# Patient Record
Sex: MALE | Race: WHITE | NOT HISPANIC OR LATINO | Employment: FULL TIME | URBAN - METROPOLITAN AREA
[De-identification: names, ages, dates, MRNs, and addresses within clinical notes are randomized per-mention and may not be internally consistent; named-entity substitution may affect disease eponyms.]

---

## 2019-12-05 ENCOUNTER — LAB VISIT (OUTPATIENT)
Dept: LAB | Facility: HOSPITAL | Age: 29
End: 2019-12-05
Attending: INTERNAL MEDICINE
Payer: COMMERCIAL

## 2019-12-05 ENCOUNTER — IMMUNIZATION (OUTPATIENT)
Dept: PHARMACY | Facility: CLINIC | Age: 29
End: 2019-12-05
Payer: COMMERCIAL

## 2019-12-05 ENCOUNTER — OFFICE VISIT (OUTPATIENT)
Dept: INTERNAL MEDICINE | Facility: CLINIC | Age: 29
End: 2019-12-05
Payer: COMMERCIAL

## 2019-12-05 ENCOUNTER — IMMUNIZATION (OUTPATIENT)
Dept: PHARMACY | Facility: CLINIC | Age: 29
End: 2019-12-05

## 2019-12-05 VITALS
HEIGHT: 75 IN | OXYGEN SATURATION: 97 % | SYSTOLIC BLOOD PRESSURE: 158 MMHG | DIASTOLIC BLOOD PRESSURE: 100 MMHG | HEART RATE: 83 BPM | WEIGHT: 255.06 LBS | BODY MASS INDEX: 31.71 KG/M2

## 2019-12-05 DIAGNOSIS — R03.0 ELEVATED BP WITHOUT DIAGNOSIS OF HYPERTENSION: Primary | ICD-10-CM

## 2019-12-05 DIAGNOSIS — Z01.89 ROUTINE LAB DRAW: ICD-10-CM

## 2019-12-05 DIAGNOSIS — M75.21 TENDONITIS OF UPPER BICEPS TENDON OF RIGHT SHOULDER: ICD-10-CM

## 2019-12-05 DIAGNOSIS — L03.012 PARONYCHIA OF LEFT MIDDLE FINGER: ICD-10-CM

## 2019-12-05 DIAGNOSIS — D22.9 CHANGE IN SKIN MOLE: ICD-10-CM

## 2019-12-05 LAB
ALBUMIN SERPL BCP-MCNC: 4.5 G/DL (ref 3.5–5.2)
ALP SERPL-CCNC: 73 U/L (ref 55–135)
ALT SERPL W/O P-5'-P-CCNC: 34 U/L (ref 10–44)
ANION GAP SERPL CALC-SCNC: 11 MMOL/L (ref 8–16)
AST SERPL-CCNC: 20 U/L (ref 10–40)
BASOPHILS # BLD AUTO: 0.03 K/UL (ref 0–0.2)
BASOPHILS NFR BLD: 0.4 % (ref 0–1.9)
BILIRUB SERPL-MCNC: 0.7 MG/DL (ref 0.1–1)
BUN SERPL-MCNC: 14 MG/DL (ref 6–20)
CALCIUM SERPL-MCNC: 9.6 MG/DL (ref 8.7–10.5)
CHLORIDE SERPL-SCNC: 102 MMOL/L (ref 95–110)
CHOLEST SERPL-MCNC: 237 MG/DL (ref 120–199)
CHOLEST/HDLC SERPL: 6.2 {RATIO} (ref 2–5)
CO2 SERPL-SCNC: 29 MMOL/L (ref 23–29)
CREAT SERPL-MCNC: 1.2 MG/DL (ref 0.5–1.4)
DIFFERENTIAL METHOD: ABNORMAL
EOSINOPHIL # BLD AUTO: 0.2 K/UL (ref 0–0.5)
EOSINOPHIL NFR BLD: 2.9 % (ref 0–8)
ERYTHROCYTE [DISTWIDTH] IN BLOOD BY AUTOMATED COUNT: 12.4 % (ref 11.5–14.5)
EST. GFR  (AFRICAN AMERICAN): >60 ML/MIN/1.73 M^2
EST. GFR  (NON AFRICAN AMERICAN): >60 ML/MIN/1.73 M^2
GLUCOSE SERPL-MCNC: 95 MG/DL (ref 70–110)
HCT VFR BLD AUTO: 46.9 % (ref 40–54)
HDLC SERPL-MCNC: 38 MG/DL (ref 40–75)
HDLC SERPL: 16 % (ref 20–50)
HGB BLD-MCNC: 17.1 G/DL (ref 14–18)
LDLC SERPL CALC-MCNC: 137 MG/DL (ref 63–159)
LYMPHOCYTES # BLD AUTO: 1.9 K/UL (ref 1–4.8)
LYMPHOCYTES NFR BLD: 27 % (ref 18–48)
MCH RBC QN AUTO: 29.9 PG (ref 27–31)
MCHC RBC AUTO-ENTMCNC: 36.5 G/DL (ref 32–36)
MCV RBC AUTO: 82 FL (ref 82–98)
MONOCYTES # BLD AUTO: 0.5 K/UL (ref 0.3–1)
MONOCYTES NFR BLD: 6.8 % (ref 4–15)
NEUTROPHILS # BLD AUTO: 4.4 K/UL (ref 1.8–7.7)
NEUTROPHILS NFR BLD: 62.9 % (ref 38–73)
NONHDLC SERPL-MCNC: 199 MG/DL
PLATELET # BLD AUTO: 217 K/UL (ref 150–350)
PMV BLD AUTO: 10.8 FL (ref 9.2–12.9)
POTASSIUM SERPL-SCNC: 4 MMOL/L (ref 3.5–5.1)
PROT SERPL-MCNC: 7.5 G/DL (ref 6–8.4)
RBC # BLD AUTO: 5.71 M/UL (ref 4.6–6.2)
SODIUM SERPL-SCNC: 142 MMOL/L (ref 136–145)
TRIGL SERPL-MCNC: 310 MG/DL (ref 30–150)
WBC # BLD AUTO: 7.01 K/UL (ref 3.9–12.7)

## 2019-12-05 PROCEDURE — 3008F BODY MASS INDEX DOCD: CPT | Mod: CPTII,S$GLB,, | Performed by: INTERNAL MEDICINE

## 2019-12-05 PROCEDURE — 99204 OFFICE O/P NEW MOD 45 MIN: CPT | Mod: S$GLB,,, | Performed by: INTERNAL MEDICINE

## 2019-12-05 PROCEDURE — 3008F PR BODY MASS INDEX (BMI) DOCUMENTED: ICD-10-PCS | Mod: CPTII,S$GLB,, | Performed by: INTERNAL MEDICINE

## 2019-12-05 PROCEDURE — 83036 HEMOGLOBIN GLYCOSYLATED A1C: CPT

## 2019-12-05 PROCEDURE — 80053 COMPREHEN METABOLIC PANEL: CPT

## 2019-12-05 PROCEDURE — 99999 PR PBB SHADOW E&M-NEW PATIENT-LVL IV: CPT | Mod: PBBFAC,,, | Performed by: INTERNAL MEDICINE

## 2019-12-05 PROCEDURE — 80061 LIPID PANEL: CPT

## 2019-12-05 PROCEDURE — 99999 PR PBB SHADOW E&M-NEW PATIENT-LVL IV: ICD-10-PCS | Mod: PBBFAC,,, | Performed by: INTERNAL MEDICINE

## 2019-12-05 PROCEDURE — 36415 COLL VENOUS BLD VENIPUNCTURE: CPT

## 2019-12-05 PROCEDURE — 99204 PR OFFICE/OUTPT VISIT, NEW, LEVL IV, 45-59 MIN: ICD-10-PCS | Mod: S$GLB,,, | Performed by: INTERNAL MEDICINE

## 2019-12-05 PROCEDURE — 85025 COMPLETE CBC W/AUTO DIFF WBC: CPT

## 2019-12-05 RX ORDER — IBUPROFEN 200 MG
200 TABLET ORAL EVERY 6 HOURS PRN
COMMUNITY
End: 2024-02-07

## 2019-12-05 NOTE — PROGRESS NOTES
"    CHIEF COMPLAINT     CC: elevated BP and r shoulder pain  New patient    HPI     Ellitot Chavez is a 29 y.o. male here today for elevated BP    Elevated BP.   Never had previous measurement of HTN. Reports he checks at drug store periodically but never had high BP. Reports several family members with HTN. No hx of secondary workup. However, family is heavy.  Reports that since moving to [place], his diet hasn't been as good and his salt intake has definitely increased.      R shoulder  Reports dull ache anterior shoulder-->upper arm. Patient reports pain is brought on by bow motion. Has been going on for 10 months. Reports sx are off and on. Reports no sx for past week but had episode today. Sx are more frequent when he is busy playing. Never been seen by provider for this issue. Taking ibuprofen which has been helpful.   No hx trauma, bruising,     Changing moles.  Reports has noticed some changes to several of his moles.     Personally Reviewed Patient's Medical, surgical, family and social hx. Changes updated in [facility].  Care Team updated in Epic    Review of Systems:  Review of Systems   Eyes: Negative for visual disturbance.   Musculoskeletal: Positive for arthralgias (r shoulder).   Skin:        moles   Neurological: Negative for headaches.       Health Maintenance:   Reviewed with patient  Due for the following:  TDAP- deferred 2/2 concert  Flu      PHYSICAL EXAM     BP (!) 158/100   Pulse 83   Ht 6' 3" (1.905 m)   Wt 115.7 kg (255 lb 1.2 oz)   SpO2 97%   BMI 31.88 kg/m²     Gen: Well Appearing, NAD  HEENT: PERR, EOMI  Neck: FROM, no thyromegaly, no cervical adenopathy  CVD: RRR, no M/R/G  Pulm: Normal work of breathing, CTAB, no wheezing  Abd:  Soft, NT, ND non TTP, no mass  MSK: no LE edema  Neuro: A&Ox3, gait normal, speech normal  Mood; Mood normal, behavior normal, thought process linear   Msk: TTP proximal anterior shoulder, exacerbated with resisted elbow flexion, and uppercut.   -joleen Pope " press, internal/external rotation  Hand: paronychia L middle finger.    LABS     Labs reviewed; ordered today    ASSESSMENT     1. Elevated BP without diagnosis of hypertension     2. Tendonitis of upper biceps tendon of right shoulder     3. Paronychia of left middle finger     4. Routine lab draw  CBC auto differential    Comprehensive metabolic panel    Lipid panel    Hemoglobin A1c   5. Change in skin mole  Ambulatory Referral to Dermatology           Plan     Elliott Chavez is a 29 y.o. male with    1. Elevated BP without diagnosis of hypertension  First elevation. However, concerned pt has family history. Will have him check BP 2x week and reassess in 1m. Would consider checking danilo/renin if BP difficult to control  -given hand out regarding DASH diet  -recommend weight loss 5-7% and increase in physical activity    2. Tendonitis of upper biceps tendon of right shoulder  Discussed activity modification, symptomatic relief and exercises. 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 Paronychia of left middle finger  Recommend soak TISHARON,     4. Routine lab draw  - CBC auto differential; Future  - Comprehensive metabolic panel; Future  - Lipid panel; Future  - Hemoglobin A1c; Future    5. Change in skin mole  - Ambulatory Referral to Dermatology; Future    RTC 1 mo    Mati Huffman MD

## 2019-12-06 LAB
ESTIMATED AVG GLUCOSE: 85 MG/DL (ref 68–131)
HBA1C MFR BLD HPLC: 4.6 % (ref 4–5.6)

## 2020-01-07 ENCOUNTER — OFFICE VISIT (OUTPATIENT)
Dept: INTERNAL MEDICINE | Facility: CLINIC | Age: 30
End: 2020-01-07
Payer: COMMERCIAL

## 2020-01-07 VITALS
BODY MASS INDEX: 30.78 KG/M2 | WEIGHT: 252.75 LBS | DIASTOLIC BLOOD PRESSURE: 114 MMHG | SYSTOLIC BLOOD PRESSURE: 170 MMHG | HEIGHT: 76 IN | OXYGEN SATURATION: 98 % | HEART RATE: 79 BPM

## 2020-01-07 DIAGNOSIS — I10 HYPERTENSION, UNSPECIFIED TYPE: Primary | ICD-10-CM

## 2020-01-07 DIAGNOSIS — M25.532 ACUTE PAIN OF LEFT WRIST: ICD-10-CM

## 2020-01-07 PROCEDURE — 99214 PR OFFICE/OUTPT VISIT, EST, LEVL IV, 30-39 MIN: ICD-10-PCS | Mod: S$GLB,,, | Performed by: INTERNAL MEDICINE

## 2020-01-07 PROCEDURE — 3080F DIAST BP >= 90 MM HG: CPT | Mod: CPTII,S$GLB,, | Performed by: INTERNAL MEDICINE

## 2020-01-07 PROCEDURE — 3008F PR BODY MASS INDEX (BMI) DOCUMENTED: ICD-10-PCS | Mod: CPTII,S$GLB,, | Performed by: INTERNAL MEDICINE

## 2020-01-07 PROCEDURE — 99999 PR PBB SHADOW E&M-EST. PATIENT-LVL III: ICD-10-PCS | Mod: PBBFAC,,, | Performed by: INTERNAL MEDICINE

## 2020-01-07 PROCEDURE — 99214 OFFICE O/P EST MOD 30 MIN: CPT | Mod: S$GLB,,, | Performed by: INTERNAL MEDICINE

## 2020-01-07 PROCEDURE — 3080F PR MOST RECENT DIASTOLIC BLOOD PRESSURE >= 90 MM HG: ICD-10-PCS | Mod: CPTII,S$GLB,, | Performed by: INTERNAL MEDICINE

## 2020-01-07 PROCEDURE — 3008F BODY MASS INDEX DOCD: CPT | Mod: CPTII,S$GLB,, | Performed by: INTERNAL MEDICINE

## 2020-01-07 PROCEDURE — 3077F SYST BP >= 140 MM HG: CPT | Mod: CPTII,S$GLB,, | Performed by: INTERNAL MEDICINE

## 2020-01-07 PROCEDURE — 99999 PR PBB SHADOW E&M-EST. PATIENT-LVL III: CPT | Mod: PBBFAC,,, | Performed by: INTERNAL MEDICINE

## 2020-01-07 PROCEDURE — 3077F PR MOST RECENT SYSTOLIC BLOOD PRESSURE >= 140 MM HG: ICD-10-PCS | Mod: CPTII,S$GLB,, | Performed by: INTERNAL MEDICINE

## 2020-01-07 RX ORDER — AMLODIPINE BESYLATE 5 MG/1
5 TABLET ORAL DAILY
Qty: 30 TABLET | Refills: 11 | Status: SHIPPED | OUTPATIENT
Start: 2020-01-07 | End: 2020-02-04

## 2020-01-07 NOTE — PROGRESS NOTES
"    CHIEF COMPLAINT     Chief Complaint   Patient presents with    Follow-up     one-month f/u       HPI     Scott Gallagher is a 29 y.o. male here today for HTN follow up    HTN   BP persistently elevated over the past month. Hasn't really checked BP at home.  Reports he has decreased salt intake over the past month.     Left wrist pain  Reports acute onset of left wrist pain after resisted extension S closed in a car door.  Pain is exacerbated with active extension past neutral and supination.  Patient has been using topical CBD Jeronimo and taking Tylenol as needed for pain. 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  Denies any point tenderness    Personally Reviewed Patient's Medical, surgical, family and social hx. Changes updated in Epic.  Patient working for NO [employer]. Reports position will end in May  Care Team updated in Epic    Review of Systems:  Review of Systems   Respiratory: Negative for shortness of breath.    Cardiovascular: Negative for leg swelling.       Health Maintenance:   Reviewed with patient  Due for the following:      PHYSICAL EXAM     BP (!) 170/114 (BP Location: Left arm, Patient Position: Sitting, BP Method: Large (Manual))   Pulse 79   Ht 6' 3.5" (1.918 m)   Wt 114.7 kg (252 lb 12.1 oz)   SpO2 98%   BMI 31.18 kg/m²     Gen: Well Appearing, NAD  HEENT: PERR, EOMI  Neck: FROM, no thyromegaly, no cervical adenopathy  CVD: RRR, no M/R/G  Neuro: A&Ox3, gait normal, speech normal  Mood; Mood normal, behavior normal, thought process linear   Left wrist:  Normal appearance, no bruising full range of motion.  Pain with extension past neutral, worse with active extension versus passive no pain with flexion active or passive.  Pain with with hyperpronation    LABS     Labs reviewed; Notable for    ASSESSMENT     1. Hypertension, unspecified type  amLODIPine (NORVASC) 5 MG tablet   2. Acute pain of left wrist             Plan     Scott Gallagher is a 29 " y.o. male with  1. Hypertension, unspecified type  nnew dx further treatment planned  - amLODIPine (NORVASC) 5 MG tablet; Take 1 tablet (5 mg total) by mouth once daily.  Dispense: 30 tablet; Refill: 11  controlled , Target BP: <140/90  Meds   Changes: start amlodipine 5mg daily.   RF modification  Work on maintaining healthy body weight, monitor salt intake and treat comorbid medical conditions that may adversely impact BP.  Will check TIW and will rtc in 1month for medication titration.    2. Acute pain of left wrist  Suspect extensor tendonitis based on examination. Recommend continued topical therapy and tylenol ES for pain.   Will try wearing night time neutral wrist splint    Fede Huffman MD

## 2020-01-10 ENCOUNTER — TELEPHONE (OUTPATIENT)
Dept: INTERNAL MEDICINE | Facility: CLINIC | Age: 30
End: 2020-01-10

## 2020-01-10 DIAGNOSIS — J11.1 INFLUENZA: Primary | ICD-10-CM

## 2020-01-10 RX ORDER — OSELTAMIVIR PHOSPHATE 75 MG/1
75 CAPSULE ORAL 2 TIMES DAILY
Qty: 10 CAPSULE | Refills: 0 | Status: SHIPPED | OUTPATIENT
Start: 2020-01-10 | End: 2020-01-15

## 2020-01-10 NOTE — TELEPHONE ENCOUNTER
"----- Message from Yash Quintero sent at 1/10/2020 11:12 AM CST -----  Contact: Patient [phone]  Patient would like to get medical advice.  Symptoms (please be specific):  Flu like symptoms   How long has patient had these symptoms:  24 hours now   Pharmacy name and phone #:  [id number] [address] [phone] (Phone) [phone] (Fax    Comments: Patient calling stating has severe flu like symptoms, would like to know if can be prescribed "Emi Flu" sent to pharmacy above, call to inform.    Please call an advise  Thank you    "

## 2020-01-11 NOTE — PROGRESS NOTES
Patient with acute onset of flu like sx within last 24 hours. Will go ahead and treat with tamiflu    Fede Huffman

## 2020-01-14 ENCOUNTER — PATIENT OUTREACH (OUTPATIENT)
Dept: ADMINISTRATIVE | Facility: OTHER | Age: 30
End: 2020-01-14

## 2020-01-16 ENCOUNTER — INITIAL CONSULT (OUTPATIENT)
Dept: DERMATOLOGY | Facility: CLINIC | Age: 30
End: 2020-01-16
Payer: COMMERCIAL

## 2020-01-16 DIAGNOSIS — D18.01 CHERRY ANGIOMA: ICD-10-CM

## 2020-01-16 DIAGNOSIS — L91.8 SKIN TAG: ICD-10-CM

## 2020-01-16 DIAGNOSIS — D22.9 CHANGE IN SKIN MOLE: ICD-10-CM

## 2020-01-16 DIAGNOSIS — D48.5 NEOPLASM OF UNCERTAIN BEHAVIOR OF SKIN: Primary | ICD-10-CM

## 2020-01-16 DIAGNOSIS — N48.89 PEARLY PENILE PAPULES: ICD-10-CM

## 2020-01-16 DIAGNOSIS — D22.9 MULTIPLE BENIGN NEVI: ICD-10-CM

## 2020-01-16 DIAGNOSIS — L73.8: ICD-10-CM

## 2020-01-16 DIAGNOSIS — D22.9 ATYPICAL NEVUS: ICD-10-CM

## 2020-01-16 DIAGNOSIS — Z12.83 SCREENING FOR MALIGNANT NEOPLASM OF SKIN: ICD-10-CM

## 2020-01-16 DIAGNOSIS — I87.2 VENOUS STASIS DERMATITIS OF BOTH LOWER EXTREMITIES: ICD-10-CM

## 2020-01-16 PROCEDURE — 88312 PR  SPECIAL STAINS,GROUP I: ICD-10-PCS | Mod: 26,,, | Performed by: PATHOLOGY

## 2020-01-16 PROCEDURE — 88312 SPECIAL STAINS GROUP 1: CPT | Performed by: PATHOLOGY

## 2020-01-16 PROCEDURE — 88305 TISSUE EXAM BY PATHOLOGIST: CPT | Mod: 26,,, | Performed by: PATHOLOGY

## 2020-01-16 PROCEDURE — 99203 PR OFFICE/OUTPT VISIT, NEW, LEVL III, 30-44 MIN: ICD-10-PCS | Mod: 25,S$GLB,, | Performed by: DERMATOLOGY

## 2020-01-16 PROCEDURE — 88313 SPECIAL STAINS GROUP 2: CPT | Performed by: PATHOLOGY

## 2020-01-16 PROCEDURE — 88305 TISSUE EXAM BY PATHOLOGIST: ICD-10-PCS | Mod: 26,,, | Performed by: PATHOLOGY

## 2020-01-16 PROCEDURE — 88342 IMHCHEM/IMCYTCHM 1ST ANTB: CPT | Performed by: PATHOLOGY

## 2020-01-16 PROCEDURE — 88312 SPECIAL STAINS GROUP 1: CPT | Mod: 26,,, | Performed by: PATHOLOGY

## 2020-01-16 PROCEDURE — 88305 TISSUE EXAM BY PATHOLOGIST: CPT | Performed by: PATHOLOGY

## 2020-01-16 PROCEDURE — 11102 TANGNTL BX SKIN SINGLE LES: CPT | Mod: S$GLB,,, | Performed by: DERMATOLOGY

## 2020-01-16 PROCEDURE — 99999 PR PBB SHADOW E&M-EST. PATIENT-LVL III: CPT | Mod: PBBFAC,,, | Performed by: DERMATOLOGY

## 2020-01-16 PROCEDURE — 88313 SPECIAL STAINS GROUP 2: CPT | Mod: 26,,, | Performed by: PATHOLOGY

## 2020-01-16 PROCEDURE — 88342 IMHCHEM/IMCYTCHM 1ST ANTB: CPT | Mod: 26,,, | Performed by: PATHOLOGY

## 2020-01-16 PROCEDURE — 11102 PR TANGENTIAL BIOPSY, SKIN, SINGLE LESION: ICD-10-PCS | Mod: S$GLB,,, | Performed by: DERMATOLOGY

## 2020-01-16 PROCEDURE — 88313 PR  SPECIAL STAINS,GROUP II: ICD-10-PCS | Mod: 26,,, | Performed by: PATHOLOGY

## 2020-01-16 PROCEDURE — 88342 CHG IMMUNOCYTOCHEMISTRY: ICD-10-PCS | Mod: 26,,, | Performed by: PATHOLOGY

## 2020-01-16 PROCEDURE — 99203 OFFICE O/P NEW LOW 30 MIN: CPT | Mod: 25,S$GLB,, | Performed by: DERMATOLOGY

## 2020-01-16 PROCEDURE — 99999 PR PBB SHADOW E&M-EST. PATIENT-LVL III: ICD-10-PCS | Mod: PBBFAC,,, | Performed by: DERMATOLOGY

## 2020-01-16 NOTE — LETTER
January 16, 2020      Fede Huffman MD  [address]  [facility] [address]           [facility] - Dermatology  [address]  [facility] [address]  Phone: [phone]  Fax: [phone]          Patient: Scott Gallagher   MR Number: [id number]   YOB: 1990   Date of Visit: 1/16/2020       Dear Dr. Fede Huffman:    Thank you for referring Scott Gallagher to me for evaluation. Attached you will find relevant portions of my assessment and plan of care.    If you have questions, please do not hesitate to call me. I look forward to following Scott Gallagher along with you.    Sincerely,    Dayna Tolbert MD    Enclosure  CC:  No Recipients    If you would like to receive this communication electronically, please contact [email or web address] or [phone] to request more information on [employer] access.    For providers and/or their staff who would like to refer a patient to [facility], please contact us through our one-stop-shop provider referral line, [facility], at [phone].    If you feel you have received this communication in error or would no longer like to receive these types of communications, please e-mail [email or web address]

## 2020-01-16 NOTE — PATIENT INSTRUCTIONS

## 2020-01-16 NOTE — PROGRESS NOTES
Subjective:       Patient ID:  Scott Gallagher is a 29 y.o. male who presents for   Chief Complaint   Patient presents with    Skin Check     tbse     Patient is here today for a mole check. yes  Pt has a history of sun exposure in the past. yes  Pt recalls several blistering sunburns in the past- yes  Pt has history of tanning bed use- no  Pt has  had moles removed in the past- yes  Pt has history of melanoma in first degree relatives-  no        Review of Systems   Constitutional: Negative for fever, chills and fatigue.   Skin: Positive for daily sunscreen use, recent sunburn and wears hat.   Hematologic/Lymphatic: Does not bruise/bleed easily.        Objective:    Physical Exam   Constitutional: He appears well-developed and well-nourished. No distress.   Genitourinary:         Neurological: He is alert and oriented to person, place, and time. He is not disoriented.   Psychiatric: He has a normal mood and affect.   Skin:   Areas Examined (abnormalities noted in diagram):   Scalp / Hair Palpated and Inspected  Head / Face Inspection Performed  Neck Inspection Performed  Chest / Axilla Inspection Performed  Abdomen Inspection Performed  Genitals / Buttocks / Groin Inspection Performed  Back Inspection Performed  RUE Inspected  LUE Inspection Performed  RLE Inspected  LLE Inspection Performed  Nails and Digits Inspection Performed                   Diagram Legend     Erythematous scaling macule/papule c/w actinic keratosis       Vascular papule c/w angioma      Pigmented verrucoid papule/plaque c/w seborrheic keratosis      Yellow umbilicated papule c/w sebaceous hyperplasia      Irregularly shaped tan macule c/w lentigo     1-2 mm smooth white papules consistent with Milia      Movable subcutaneous cyst with punctum c/w epidermal inclusion cyst      Subcutaneous movable cyst c/w pilar cyst      Firm pink to brown papule c/w dermatofibroma      Pedunculated fleshy papule(s) c/w skin tag(s)      Evenly pigmented  macule c/w junctional nevus     Mildly variegated pigmented, slightly irregular-bordered macule c/w mildly atypical nevus      Flesh colored to evenly pigmented papule c/w intradermal nevus       Pink pearly papule/plaque c/w basal cell carcinoma      Erythematous hyperkeratotic cursted plaque c/w SCC      Surgical scar with no sign of skin cancer recurrence      Open and closed comedones      Inflammatory papules and pustules      Verrucoid papule consistent consistent with wart     Erythematous eczematous patches and plaques     Dystrophic onycholytic nail with subungual debris c/w onychomycosis     Umbilicated papule    Erythematous-base heme-crusted tan verrucoid plaque consistent with inflamed seborrheic keratosis     Erythematous Silvery Scaling Plaque c/w Psoriasis     See annotation              Assessment / Plan:      Pathology Orders:     Normal Orders This Visit    Specimen to Pathology, Dermatology     Questions:    Procedure Type:  Dermatology and skin neoplasms    Number of Specimens:  1    ------------------------:  -------------------------    Spec 1 Procedure:  Biopsy    Spec 1 Clinical Impression:  r/o amelanotic melanoma vs. early sk vs other (pink patch that was dark per patient)    Spec 1 Source:  Left chest        Neoplasm of uncertain behavior of skin  -     Specimen to Pathology, Dermatology  Shave biopsy procedure note:    Shave biopsy performed after verbal consent including risk of infection, scar, recurrence, need for additional treatment of site. Area prepped with alcohol, anesthetized with approximately 1.0cc of 1% lidocaine with epinephrine. Lesional tissue shaved with razor blade. Hemostasis achieved with application of aluminum chloride followed by hyfrecation. No complications. Dressing applied. Wound care explained.        Change in skin mole  -     Ambulatory Referral to Dermatology  -     Specimen to Pathology, Dermatology    Multiple benign nevi  TBSE body skin examination  performed today including at least 12 points as noted in physical examination. No lesions suspicious for malignancy noted.  Reassurance provided.  Instructed patient to observe lesion(s) for changes and follow up in clinic if changes are noted. Discussed ABCDE's of moles and brochure provided.    Atypical nevus  Patient with mildly atypical nevi. Instructed patient to observe lesion(s) for changes and follow up in clinic if changes are noted. Discussed ABCDE's of moles and brochure provided.    Skin tag  Reassurance given to patient. No treatment is necessary.   Treatment of benign, asymptomatic lesions may be considered cosmetic.    Cherry angioma  This is a benign vascular lesion. Reassurance given. No treatment required.       Pearly penile papules  This is a benign vascular/fibrous tissue lesion. Reassurance given. No treatment required.     Sebaceous hyperplasia of scrotum  This is a common condition representing benign enlargement of the sebaceous lobule. It typically occurs in adulthood. Reassurance given to patient.     Screening for malignant neoplasm of skin  Patient instructed in importance in daily sun protection of at least spf 30. Sun avoidance and topical protection discussed.     Recommend for daily use on face and neck.    Patient encouraged to wear hat for all outdoor exposure.     Also discussed sun protective clothing.     Stasis dermatosis  -  Early stages, not quite to stasis yet, encouraged compression socks and walking 5 min q hour               No follow-ups on file.

## 2020-01-17 ENCOUNTER — TELEPHONE (OUTPATIENT)
Dept: INTERNAL MEDICINE | Facility: CLINIC | Age: 30
End: 2020-01-17

## 2020-01-17 NOTE — TELEPHONE ENCOUNTER
Spoke with pt, he says she had the flu last week. took tamiflu and felt that his symptoms have improved. He says the Fever is gone. Pt says he still has a sore throat, and a  left tender tonsil. he also says he gets ocular migraines yearly. He says at times they can get so bad that it blocks his vision.   Pt says he feels one coming on, it has been like this for the last 4 days. Pt is concerned that tamiflu may have interacted with BP meds. He would like a call to discuss further. Please advise

## 2020-01-17 NOTE — TELEPHONE ENCOUNTER
----- Message from Anu Pandya sent at 1/17/2020 10:35 AM CST -----  Contact: [phone]  Patient would like to get medical advice.  Symptoms (please be specific):  Sore throat, slight migraines   How long has patient had these symptoms:  1 week  Pharmacy name and phone # (copy/paste from chart):  [id number] [address]   [phone] (Phone)  [phone] (Fax)  Would the patient rather a call back or a response via [facility]?:  Call back  Comments:  Please advise, thanks .

## 2020-01-21 ENCOUNTER — PATIENT OUTREACH (OUTPATIENT)
Dept: ADMINISTRATIVE | Facility: HOSPITAL | Age: 30
End: 2020-01-21

## 2020-01-27 LAB
FINAL PATHOLOGIC DIAGNOSIS: NORMAL
GROSS: NORMAL
MICROSCOPIC EXAM: NORMAL

## 2020-02-04 ENCOUNTER — OFFICE VISIT (OUTPATIENT)
Dept: INTERNAL MEDICINE | Facility: CLINIC | Age: 30
End: 2020-02-04
Payer: COMMERCIAL

## 2020-02-04 ENCOUNTER — LAB VISIT (OUTPATIENT)
Dept: LAB | Facility: HOSPITAL | Age: 30
End: 2020-02-04
Payer: COMMERCIAL

## 2020-02-04 VITALS
WEIGHT: 251.13 LBS | HEIGHT: 76 IN | SYSTOLIC BLOOD PRESSURE: 170 MMHG | DIASTOLIC BLOOD PRESSURE: 110 MMHG | OXYGEN SATURATION: 97 % | HEART RATE: 85 BPM | BODY MASS INDEX: 30.58 KG/M2

## 2020-02-04 DIAGNOSIS — Z11.3 ROUTINE SCREENING FOR STI (SEXUALLY TRANSMITTED INFECTION): ICD-10-CM

## 2020-02-04 DIAGNOSIS — N50.811 PAIN IN RIGHT TESTICLE: ICD-10-CM

## 2020-02-04 DIAGNOSIS — G25.2 INTENTION TREMOR: ICD-10-CM

## 2020-02-04 DIAGNOSIS — G43.109 OCULAR MIGRAINE: ICD-10-CM

## 2020-02-04 DIAGNOSIS — I10 HYPERTENSION, UNSPECIFIED TYPE: Primary | ICD-10-CM

## 2020-02-04 PROCEDURE — 86703 HIV-1/HIV-2 1 RESULT ANTBDY: CPT

## 2020-02-04 PROCEDURE — 99214 PR OFFICE/OUTPT VISIT, EST, LEVL IV, 30-39 MIN: ICD-10-PCS | Mod: S$GLB,,, | Performed by: INTERNAL MEDICINE

## 2020-02-04 PROCEDURE — 87591 N.GONORRHOEAE DNA AMP PROB: CPT

## 2020-02-04 PROCEDURE — 99999 PR PBB SHADOW E&M-EST. PATIENT-LVL III: ICD-10-PCS | Mod: PBBFAC,,, | Performed by: INTERNAL MEDICINE

## 2020-02-04 PROCEDURE — 3077F PR MOST RECENT SYSTOLIC BLOOD PRESSURE >= 140 MM HG: ICD-10-PCS | Mod: CPTII,S$GLB,, | Performed by: INTERNAL MEDICINE

## 2020-02-04 PROCEDURE — 99214 OFFICE O/P EST MOD 30 MIN: CPT | Mod: S$GLB,,, | Performed by: INTERNAL MEDICINE

## 2020-02-04 PROCEDURE — 3080F DIAST BP >= 90 MM HG: CPT | Mod: CPTII,S$GLB,, | Performed by: INTERNAL MEDICINE

## 2020-02-04 PROCEDURE — 3008F BODY MASS INDEX DOCD: CPT | Mod: CPTII,S$GLB,, | Performed by: INTERNAL MEDICINE

## 2020-02-04 PROCEDURE — 3008F PR BODY MASS INDEX (BMI) DOCUMENTED: ICD-10-PCS | Mod: CPTII,S$GLB,, | Performed by: INTERNAL MEDICINE

## 2020-02-04 PROCEDURE — 99999 PR PBB SHADOW E&M-EST. PATIENT-LVL III: CPT | Mod: PBBFAC,,, | Performed by: INTERNAL MEDICINE

## 2020-02-04 PROCEDURE — 3080F PR MOST RECENT DIASTOLIC BLOOD PRESSURE >= 90 MM HG: ICD-10-PCS | Mod: CPTII,S$GLB,, | Performed by: INTERNAL MEDICINE

## 2020-02-04 PROCEDURE — 3077F SYST BP >= 140 MM HG: CPT | Mod: CPTII,S$GLB,, | Performed by: INTERNAL MEDICINE

## 2020-02-04 PROCEDURE — 36415 COLL VENOUS BLD VENIPUNCTURE: CPT

## 2020-02-04 RX ORDER — PROPRANOLOL HYDROCHLORIDE 10 MG/1
10 TABLET ORAL 3 TIMES DAILY PRN
Qty: 90 TABLET | Refills: 11 | Status: SHIPPED | OUTPATIENT
Start: 2020-02-04 | End: 2020-03-27 | Stop reason: SDUPTHER

## 2020-02-04 RX ORDER — AMLODIPINE BESYLATE 10 MG/1
10 TABLET ORAL DAILY
Qty: 30 TABLET | Refills: 11 | Status: SHIPPED | OUTPATIENT
Start: 2020-02-04 | End: 2020-03-03

## 2020-02-04 RX ORDER — LOSARTAN POTASSIUM 50 MG/1
50 TABLET ORAL DAILY
Qty: 30 TABLET | Refills: 3 | Status: SHIPPED | OUTPATIENT
Start: 2020-02-04 | End: 2020-03-03 | Stop reason: SDUPTHER

## 2020-02-04 NOTE — PROGRESS NOTES
"    CHIEF COMPLAINT     Chief Complaint   Patient presents with    Follow-up     one-month f/u; wants STD screening also       HPI     Scott Gallagher is a 29 y.o. male here today for     HTN  Taking amlodipine 5mg. He checks 1-2x weeks. BP 150s/100s. He checks in the evening.  Denies sx of hypo/HTN  RF: weight    Ocular Migraine  Reports having episode worse than usual. Last longer and symptoms more intense than previous. Sx start off with ocular aura--->headache usually. However, reports having persistent aura.  Has never had aura last this long.      Right testicular tenderness  Reports noticing a little bit of tenderness and swelling at the base of his right testicle.  Denies any fever dysuria or urethral discharge. Denies any trauma to the area.  Personally Reviewed Patient's Medical, surgical, family and social hx. Changes updated in [facility].  Care Team updated in Epic    Review of Systems:  Review of Systems   Respiratory: Negative for cough and shortness of breath.    Cardiovascular: Negative for chest pain and leg swelling.   Genitourinary: Positive for testicular pain. Negative for discharge, dysuria, genital sores, hematuria, penile pain and scrotal swelling.    Tremor during audition    Health Maintenance:   Reviewed with patient  Due for the following:  Current    PHYSICAL EXAM     BP (!) 170/110 (BP Location: Left arm, Patient Position: Sitting, BP Method: Large (Manual))   Pulse 85   Ht 6' 3.5" (1.918 m)   Wt 113.9 kg (251 lb 1.7 oz)   SpO2 97%   BMI 30.97 kg/m²     Gen: Well Appearing, NAD  HEENT: PERR, EOMI  Neck: FROM, no thyromegaly, no cervical adenopathy  CVD: RRR, no M/R/G  Pulm: Normal work of breathing, CTAB, no wheezing  Abd:  Soft, NT, ND non TTP, no mass  :  Tender at epididymal head right base of the testicle.  No symptoms left testicle  MSK: no LE edema  Neuro: A&Ox3, gait normal, speech normal  Mood; Mood normal, behavior normal, thought process linear       LABS     Labs " reviewed;    Lab Results   Component Value Date    CREATININE 1.2 12/05/2019    BUN 14 12/05/2019     12/05/2019    K 4.0 12/05/2019     12/05/2019    CO2 29 12/05/2019         ASSESSMENT     1. Hypertension, unspecified type  amLODIPine (NORVASC) 10 MG tablet    losartan (COZAAR) 50 MG tablet   2. Ocular migraine  Ambulatory referral/consult to Ophthalmology   3. Intention tremor  propranolol (INDERAL) 10 MG tablet   4. Pain in right testicle     5. Routine screening for STI (sexually transmitted infection)  C. trachomatis/N. gonorrhoeae by AMP DNA [facility]; Urine    HIV 1/2 Ag/Ab (4th Gen)           Plan     Scott Gallagher is a 29 y.o. male with  1. Hypertension, unspecified type  Increase amlodipine 10mg daily  Start losartan 50mg daily  Will RTC 1 month for BP check  - amLODIPine (NORVASC) 10 MG tablet; Take 1 tablet (10 mg total) by mouth once daily.  Dispense: 30 tablet; Refill: 11  - losartan (COZAAR) 50 MG tablet; Take 1 tablet (50 mg total) by mouth once daily.  Dispense: 30 tablet; Refill: 3    2. Ocular migraine  Refer to neuroopthal  - Ambulatory referral/consult to Ophthalmology; Future    3. Intention tremor  Will initiate therapeutic trial for tremor associated with performance anxiety during audition  - propranolol (INDERAL) 10 MG tablet; Take 1 tablet (10 mg total) by mouth 3 (three) times daily as needed (audition for tremor).  Dispense: 90 tablet; Refill: 11    4. Pain in right testicle  Tenderness right epididymal head no reflex symptoms.  Will watch clinically his symptoms persist will get an ultrasound to further evaluate    5. Routine screening for STI (sexually transmitted infection)  New partner, no known exposure  - C. trachomatis/N. gonorrhoeae by AMP DNA [facility]; Urine  - HIV 1/2 Ag/Ab (4th Gen); Future    RTC 1 month    Fede Huffman MD

## 2020-02-05 ENCOUNTER — TELEPHONE (OUTPATIENT)
Dept: INTERNAL MEDICINE | Facility: CLINIC | Age: 30
End: 2020-02-05

## 2020-02-05 LAB — HIV 1+2 AB+HIV1 P24 AG SERPL QL IA: NEGATIVE

## 2020-02-05 NOTE — TELEPHONE ENCOUNTER
Pt says he took medication at noon today. Pt says at 2pm after taking medication these were his symptoms:   Head rush, feeling like he was high, he had tingling in his skin, headache. Pt was at therapist appt when these symptoms happened. Took BP today at appt  198/120  Before leaving appt BP was 179/117  I did advise pt tjhat PCP was out and I would send to covering physician but pt states his BP is always high so that's not too concerning. He wanted message sent to PCP to see what he should do. Should he take medication mckenna?

## 2020-02-05 NOTE — TELEPHONE ENCOUNTER
----- Message from Elvi Kulkarni sent at 2/5/2020  3:32 PM CST -----  Contact: Keesha [phone]  Patient feels that he is having a response to the new medication.  He is experiencing  HBP spikesl, feeling high, skin tingle, panic atatck symtoms , light headed and head ache.  Therapist suggested that PCP be contacted.

## 2020-02-06 LAB
C TRACH DNA SPEC QL NAA+PROBE: NOT DETECTED
N GONORRHOEA DNA SPEC QL NAA+PROBE: NOT DETECTED

## 2020-02-06 NOTE — TELEPHONE ENCOUNTER
Please advised patient to take blood pressure medicines tomorrow.  Also recommend schedule urgent care visit to have pressure checked.  Thank you

## 2020-02-07 NOTE — TELEPHONE ENCOUNTER
"Pt says he feels better, he took medication yesterday and did not have any reaction. He says he has been checking BP daily. He says his BP has been lowering daily. Pt says he sees "huge signs of improvement" I asked pt for readings but he was not by log. Offered appt, pt declined. Says he will schedule if symptoms return  "

## 2020-02-09 ENCOUNTER — PATIENT MESSAGE (OUTPATIENT)
Dept: INTERNAL MEDICINE | Facility: CLINIC | Age: 30
End: 2020-02-09

## 2020-02-12 ENCOUNTER — OFFICE VISIT (OUTPATIENT)
Dept: OPHTHALMOLOGY | Facility: CLINIC | Age: 30
End: 2020-02-12
Payer: COMMERCIAL

## 2020-02-12 DIAGNOSIS — G43.109 OCULAR MIGRAINE: ICD-10-CM

## 2020-02-12 DIAGNOSIS — G43.509 PERSISTENT MIGRAINE AURA WITHOUT CEREBRAL INFARCTION AND WITHOUT STATUS MIGRAINOSUS, NOT INTRACTABLE: Primary | ICD-10-CM

## 2020-02-12 PROCEDURE — 99999 PR PBB SHADOW E&M-EST. PATIENT-LVL III: ICD-10-PCS | Mod: PBBFAC,,, | Performed by: OPHTHALMOLOGY

## 2020-02-12 PROCEDURE — 92004 PR EYE EXAM, NEW PATIENT,COMPREHESV: ICD-10-PCS | Mod: S$GLB,,, | Performed by: OPHTHALMOLOGY

## 2020-02-12 PROCEDURE — 92004 COMPRE OPH EXAM NEW PT 1/>: CPT | Mod: S$GLB,,, | Performed by: OPHTHALMOLOGY

## 2020-02-12 PROCEDURE — 99999 PR PBB SHADOW E&M-EST. PATIENT-LVL III: CPT | Mod: PBBFAC,,, | Performed by: OPHTHALMOLOGY

## 2020-02-12 NOTE — LETTER
[facility] - Ophthalmology  [address]  [facility] [address]  Phone: [phone]  Fax: [phone]   February 12, 2020    Fede Huffman MD  [address]  [facility] [address]    Patient: Scott Gallagher   MR Number: [id number]   YOB: 1990   Date of Visit: 2/12/2020       Dear Dr. Huffman:    Thank you for referring Scott Gallagher to me for evaluation. Here is my assessment and plan of care:    Assessment:  /Plan     For exam results, see Encounter Report.    Persistent migraine aura without cerebral infarction and without status migrainosus, not intractable  -     MRI Brain W WO Contrast; Future; Expected date: 02/12/2020    Ocular migraine  -     Ambulatory referral/consult to Ophthalmology  -     MRI Brain W WO Contrast; Future; Expected date: 02/12/2020      The differential diagnosis for persistent migraine aura includes a cerebral infarct. I ordered an MRI of the brain with and without contrast to look for an infarct. I will base additional testing and therapy on the basis of the MRI.          Plan:  For exam results, see Encounter Report.    Persistent migraine aura without cerebral infarction and without status migrainosus, not intractable  -     MRI Brain W WO Contrast; Future; Expected date: 02/12/2020    Ocular migraine  -     Ambulatory referral/consult to Ophthalmology  -     MRI Brain W WO Contrast; Future; Expected date: 02/12/2020      The differential diagnosis for persistent migraine aura includes a cerebral infarct. I ordered an MRI of the brain with and without contrast to look for an infarct. I will base additional testing and therapy on the basis of the MRI.            Below you will find my full exam findings. If you have questions, please do not hesitate to call me. I look forward to following Mr. Scott Gallagher along with you.    Sincerely,          Jose M Alberto MD       CC  No Recipients             Base Eye Exam     Visual Acuity (Snellen - Linear)        Right Left    Dist sc 20/20 -1 20/25 -2          Tonometry (Applanation, 8:55 AM)       Right Left    Pressure 20 20          Pupils       Dark Light Shape React APD    Right 5 3 Round Brisk None    Left 5 3 Round Brisk None          Visual Fields    Reports altered area superiorly with each eye at the same time in the same location. Still sees spot with eyes closed.           Extraocular Movement       Right Left     Full, Ortho Full, Ortho          Neuro/Psych     Oriented x3:  Yes    Mood/Affect:  Normal          Dilation     Both eyes:  2.5% Phenylephrine, 1% Mydriacyl @ 8:55 AM            Slit Lamp and Fundus Exam     External Exam       Right Left    External Normal Normal          Slit Lamp Exam       Right Left    Lids/Lashes Normal Normal    Conjunctiva/Sclera White and quiet White and quiet    Cornea Clear Clear    Anterior Chamber Deep and quiet Deep and quiet    Iris Round and reactive Round and reactive    Lens Clear Clear    Vitreous Normal Normal          Fundus Exam       Right Left    Disc Normal Normal    C/D Ratio 0.3 0.3    Macula Normal Normal    Vessels Normal Normal    Periphery Normal Normal

## 2020-02-12 NOTE — PROGRESS NOTES
HPI     Referred by Dr.Samuel ROXANNA Huffman  Hx of migraines.  Hx of floaters no flashes.  Pt states experiencing ocular aura x 3 weeks which seem to be persistence.  Notice the pain has pulsatile feeling.  Occasional headaches.  No eye pain.  No eye drops.     I have personally interviewed the patient, reviewed the history and   examined the patient and agree with the technician's exam.    Last edited by Jose M Alberto MD on 2/12/2020  8:21 AM. (History)            Assessment /Plan     For exam results, see Encounter Report.    Persistent migraine aura without cerebral infarction and without status migrainosus, not intractable  -     MRI Brain W WO Contrast; Future; Expected date: 02/12/2020    Ocular migraine  -     Ambulatory referral/consult to Ophthalmology  -     MRI Brain W WO Contrast; Future; Expected date: 02/12/2020      The differential diagnosis for persistent migraine aura includes a cerebral infarct. I ordered an MRI of the brain with and without contrast to look for an infarct. I will base additional testing and therapy on the basis of the MRI.

## 2020-03-03 ENCOUNTER — OFFICE VISIT (OUTPATIENT)
Dept: INTERNAL MEDICINE | Facility: CLINIC | Age: 30
End: 2020-03-03
Payer: COMMERCIAL

## 2020-03-03 VITALS
WEIGHT: 252 LBS | HEART RATE: 66 BPM | SYSTOLIC BLOOD PRESSURE: 125 MMHG | BODY MASS INDEX: 30.69 KG/M2 | HEIGHT: 76 IN | OXYGEN SATURATION: 98 % | DIASTOLIC BLOOD PRESSURE: 92 MMHG

## 2020-03-03 DIAGNOSIS — N52.9 ERECTILE DYSFUNCTION, UNSPECIFIED ERECTILE DYSFUNCTION TYPE: ICD-10-CM

## 2020-03-03 DIAGNOSIS — F41.8 PERFORMANCE ANXIETY: ICD-10-CM

## 2020-03-03 DIAGNOSIS — R05.8 UPPER AIRWAY COUGH SYNDROME: ICD-10-CM

## 2020-03-03 DIAGNOSIS — I10 HYPERTENSION, UNSPECIFIED TYPE: Primary | ICD-10-CM

## 2020-03-03 PROCEDURE — 3080F DIAST BP >= 90 MM HG: CPT | Mod: CPTII,S$GLB,, | Performed by: INTERNAL MEDICINE

## 2020-03-03 PROCEDURE — 3074F SYST BP LT 130 MM HG: CPT | Mod: CPTII,S$GLB,, | Performed by: INTERNAL MEDICINE

## 2020-03-03 PROCEDURE — 99999 PR PBB SHADOW E&M-EST. PATIENT-LVL III: CPT | Mod: PBBFAC,,, | Performed by: INTERNAL MEDICINE

## 2020-03-03 PROCEDURE — 99214 PR OFFICE/OUTPT VISIT, EST, LEVL IV, 30-39 MIN: ICD-10-PCS | Mod: S$GLB,,, | Performed by: INTERNAL MEDICINE

## 2020-03-03 PROCEDURE — 3008F BODY MASS INDEX DOCD: CPT | Mod: CPTII,S$GLB,, | Performed by: INTERNAL MEDICINE

## 2020-03-03 PROCEDURE — 3008F PR BODY MASS INDEX (BMI) DOCUMENTED: ICD-10-PCS | Mod: CPTII,S$GLB,, | Performed by: INTERNAL MEDICINE

## 2020-03-03 PROCEDURE — 3074F PR MOST RECENT SYSTOLIC BLOOD PRESSURE < 130 MM HG: ICD-10-PCS | Mod: CPTII,S$GLB,, | Performed by: INTERNAL MEDICINE

## 2020-03-03 PROCEDURE — 3080F PR MOST RECENT DIASTOLIC BLOOD PRESSURE >= 90 MM HG: ICD-10-PCS | Mod: CPTII,S$GLB,, | Performed by: INTERNAL MEDICINE

## 2020-03-03 PROCEDURE — 99214 OFFICE O/P EST MOD 30 MIN: CPT | Mod: S$GLB,,, | Performed by: INTERNAL MEDICINE

## 2020-03-03 PROCEDURE — 99999 PR PBB SHADOW E&M-EST. PATIENT-LVL III: ICD-10-PCS | Mod: PBBFAC,,, | Performed by: INTERNAL MEDICINE

## 2020-03-03 RX ORDER — AMLODIPINE BESYLATE 5 MG/1
5 TABLET ORAL DAILY
Qty: 30 TABLET | Refills: 11 | Status: SHIPPED | OUTPATIENT
Start: 2020-03-03 | End: 2020-03-27 | Stop reason: SDUPTHER

## 2020-03-03 RX ORDER — SILDENAFIL 50 MG/1
50 TABLET, FILM COATED ORAL DAILY PRN
Qty: 30 TABLET | Refills: 2 | Status: SHIPPED | OUTPATIENT
Start: 2020-03-03 | End: 2020-03-27 | Stop reason: SDUPTHER

## 2020-03-03 RX ORDER — LOSARTAN POTASSIUM 100 MG/1
100 TABLET ORAL DAILY
Qty: 30 TABLET | Refills: 3 | Status: SHIPPED | OUTPATIENT
Start: 2020-03-03 | End: 2020-03-27 | Stop reason: SDUPTHER

## 2020-03-03 NOTE — PROGRESS NOTES
CHIEF COMPLAINT     Chief Complaint   Patient presents with    Follow-up     4-week f/u    Cough     persistent cough; ongoing for about 4 weeks; recent travel to [place]       HPI     Scott Gallagher is a 29 y.o. male here today for   Follow-up hypertension    HTN  At last visit,Added losartan 50 mg to his amlodipine 10 mg.  Significant improvement blood pressure.  Patient is having 120s and 130s over 70s and 80s at home.  He is checking blood pressure 1 to 3 times a week.Also reports he is trying to decrease his salt intake.    ED  Patient reports since starting the amlodipine he has noticed decrease in quality of erection. Reports libido intact and he is sometimes getting AM erections. Reports can get erections but not as hard as prior to starting BP medications. Reports symptoms started before adding losartan and before taking PRN propranolol.  Has not noticed any worsening nor improvement in sx since starting losartan.    Cough  Reports having URI approximately 3 weeks ago.  Fever chills, however the cough has lingered.  Associated symptoms include postnasal drip.  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  Patient has been over-the-counter Mucinex without much relief.    Performance anxiety  At last visit good decision made to do therapeutic trial of p.r.n. 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  Reports significant improvement in symptoms and would like continue on a p.r.n. Basis.    Personally Reviewed Patient's Medical, surgical, family and social hx. Changes updated in [facility].  Care Team updated in Epic    Review of Systems:  Review of Systems   Constitutional: Negative for fever.   HENT: Positive for postnasal drip and voice change.    Respiratory: Positive for cough. Negative for choking.    Genitourinary:        ED       Health Maintenance:   Reviewed with patient  Due for the following:  Update    PHYSICAL EXAM     BP (!) 125/92 (BP Location: Left  "arm, Patient Position: Sitting, BP Method: Large (Manual))   Pulse 66   Ht 6' 3.5" (1.918 m)   Wt 114.3 kg (251 lb 15.8 oz)   SpO2 98%   BMI 31.08 kg/m²     Gen: Well Appearing, NAD  HEENT: PERR, EOMI, bilateral large turbinate, copious mucus and posterior pharynx throat tonsillar edema or exudate  Neck: FROM, no thyromegaly, no cervical adenopathy  CVD: RRR, no M/R/G  Pulm: Normal work of breathing, CTAB, no wheezing  Abd:  Soft, NT, ND non TTP, no mass  MSK: no LE edema  Neuro: A&Ox3, gait normal, speech normal  Mood; Mood normal, behavior normal, thought process linear       LABS     Labs reviewed;    Lab Results   Component Value Date    CREATININE 1.2 12/05/2019    BUN 14 12/05/2019     12/05/2019    K 4.0 12/05/2019     12/05/2019    CO2 29 12/05/2019       ASSESSMENT     1. Hypertension, unspecified type  amLODIPine (NORVASC) 5 MG tablet    losartan (COZAAR) 100 MG tablet   2. Upper airway cough syndrome     3. Performance anxiety     4. Erectile dysfunction, unspecified erectile dysfunction type  sildenafil (VIAGRA) 50 MG tablet           Plan     Scott Gallagher is a 29 y.o. male with  1. Hypertension, unspecified type  Blood pressure significantly improved.  Home readings are at goal.  Going to adjust medications to try and address potential sexual side effects  - amLODIPine (NORVASC) 5 MG tablet; Take 1 tablet (5 mg total) by mouth once daily.  Dispense: 30 tablet; Refill: 11  - losartan (COZAAR) 100 MG tablet; Take 1 tablet (100 mg total) by mouth once daily.  Dispense: 30 tablet; Refill: 3    2. Upper airway cough syndrome  Recommend 1st generation antihistamine to try and decrease postnasal drip to help with cough symptoms.  Can use over-the-counter cough suppressant as needed    3. Performance anxiety  Significant improvement with propranolol will continue 10 mg p.r.n.    4. Erectile dysfunction, unspecified erectile dysfunction type  New problem, further treatment  Suspect psychogenic " in etiology, still getting an erection.  Will try decrease losartan and decrease amlodipine since there is a 2% risk of sexual side effect associated with amlodipine.  Will give p.r.n. dose of sildenafil to see if it helps with symptoms.  Will reassess at next visit  - sildenafil (VIAGRA) 50 MG tablet; Take 1 tablet (50 mg total) by mouth daily as needed for Erectile Dysfunction.  Dispense: 30 tablet; Refill: 2     Return in 1 month to reassess blood pressure  Fede Huffman MD

## 2020-03-07 ENCOUNTER — HOSPITAL ENCOUNTER (OUTPATIENT)
Dept: RADIOLOGY | Facility: HOSPITAL | Age: 30
Discharge: HOME | End: 2020-03-07
Attending: OPHTHALMOLOGY
Payer: COMMERCIAL

## 2020-03-07 DIAGNOSIS — G43.509 PERSISTENT MIGRAINE AURA WITHOUT CEREBRAL INFARCTION AND WITHOUT STATUS MIGRAINOSUS, NOT INTRACTABLE: ICD-10-CM

## 2020-03-07 DIAGNOSIS — G43.109 OCULAR MIGRAINE: ICD-10-CM

## 2020-03-07 PROCEDURE — 70551 MRI BRAIN WITHOUT CONTRAST: ICD-10-PCS | Mod: 26,,, | Performed by: RADIOLOGY

## 2020-03-07 PROCEDURE — 70551 MRI BRAIN STEM W/O DYE: CPT | Mod: TC

## 2020-03-07 PROCEDURE — 70551 MRI BRAIN STEM W/O DYE: CPT | Mod: 26,,, | Performed by: RADIOLOGY

## 2020-03-10 ENCOUNTER — TELEPHONE (OUTPATIENT)
Dept: OPHTHALMOLOGY | Facility: CLINIC | Age: 30
End: 2020-03-10

## 2020-03-10 DIAGNOSIS — G43.509 PERSISTENT MIGRAINE AURA WITHOUT CEREBRAL INFARCTION AND WITHOUT STATUS MIGRAINOSUS, NOT INTRACTABLE: Primary | ICD-10-CM

## 2020-03-27 ENCOUNTER — PATIENT MESSAGE (OUTPATIENT)
Dept: INTERNAL MEDICINE | Facility: CLINIC | Age: 30
End: 2020-03-27

## 2020-03-27 DIAGNOSIS — N52.9 ERECTILE DYSFUNCTION, UNSPECIFIED ERECTILE DYSFUNCTION TYPE: ICD-10-CM

## 2020-03-27 DIAGNOSIS — G25.2 INTENTION TREMOR: ICD-10-CM

## 2020-03-27 DIAGNOSIS — I10 HYPERTENSION, UNSPECIFIED TYPE: ICD-10-CM

## 2020-03-31 RX ORDER — SILDENAFIL 50 MG/1
50 TABLET, FILM COATED ORAL DAILY PRN
Qty: 30 TABLET | Refills: 2 | Status: SHIPPED | OUTPATIENT
Start: 2020-03-31 | End: 2023-06-23

## 2020-03-31 RX ORDER — AMLODIPINE BESYLATE 5 MG/1
5 TABLET ORAL DAILY
Qty: 90 TABLET | Refills: 3 | Status: SHIPPED | OUTPATIENT
Start: 2020-03-31 | End: 2020-05-09 | Stop reason: SDUPTHER

## 2020-03-31 RX ORDER — LOSARTAN POTASSIUM 100 MG/1
100 TABLET ORAL DAILY
Qty: 90 TABLET | Refills: 3 | Status: SHIPPED | OUTPATIENT
Start: 2020-03-31 | End: 2020-05-09 | Stop reason: SDUPTHER

## 2020-03-31 NOTE — TELEPHONE ENCOUNTER
Refill Routing Note     Medication(s) are not appropriate for processing by [facility]:    Disease State Not Assessed by Refill Center     Appointments  past 12m or future 3m with PCP    Date Provider   Last Visit   3/3/2020 Fede Huffman MD   Next Visit   Visit date not found Fede Huffman MD           Automatic Epic Protocol Generated Data:    Requested Prescriptions   Pending Prescriptions Disp Refills    propranoloL (INDERAL) 10 MG tablet 90 tablet 3     Sig: Take 1 tablet (10 mg total) by mouth 3 (three) times daily as needed (audition for tremor).       Cardiovascular:  Beta Blockers Failed - 3/31/2020  2:16 PM        Failed - Last BP in normal range within 360 days.     BP Readings from Last 3 Encounters:   03/03/20 (!) 125/92   02/04/20 (!) 170/110   01/07/20 (!) 170/114              Passed - Patient is at least 18 years old        Passed - Last Heart Rate in normal range within 360 days.     Pulse Readings from Last 3 Encounters:   03/03/20 66   02/04/20 85   01/07/20 79             Passed - Office visit in past 12 months or future 90 days.     Recent Outpatient Visits            4 weeks ago Hypertension, unspecified type    [facility] Fede Huffman MD    1 month ago Persistent migraine aura without cerebral infarction and without status migrainosus, not intractable    [facility] Op[facility]mology Jose M Alberto MD    1 month ago Hypertension, unspecified type    [facility] Fede Huffman MD    2 months ago Hypertension, unspecified type    [facility] Fede Huffman MD    3 months ago Elevated BP without diagnosis of hypertension    [facility] Internal Kirsten Huffman MD                       Note composed:2:44 PM 03/31/2020

## 2020-03-31 NOTE — PROGRESS NOTES
Refill Authorization Note     is requesting a refill authorization.    Brief assessment and rationale for refill: ROUTE: op (propranolol) // APPROVE: prr           Medication Therapy Plan: all 4 medications were sent within past 2 months to same pharmacy, perhaps pt is requesting a 90ds supplies, please review     Medication reconciliation completed: No   Pharmacist Review Requested: Yes                     Comments:   Refill Center Care Gap Closure protocols temporarily suspended.   Requested Prescriptions   Pending Prescriptions Disp Refills    propranoloL (INDERAL) 10 MG tablet 270 tablet 3     Sig: Take 1 tablet (10 mg total) by mouth 3 (three) times daily as needed (audition for tremor).       Cardiovascular:  Beta Blockers Failed - 3/31/2020  2:16 PM        Failed - Last BP in normal range within 360 days.     BP Readings from Last 3 Encounters:   03/03/20 (!) 125/92   02/04/20 (!) 170/110   01/07/20 (!) 170/114              Passed - Patient is at least 18 years old        Passed - Last Heart Rate in normal range within 360 days.     Pulse Readings from Last 3 Encounters:   03/03/20 66   02/04/20 85   01/07/20 79             Passed - Office visit in past 12 months or future 90 days.     Recent Outpatient Visits            4 weeks ago Hypertension, unspecified type    [facility] - Internal Medicine Fede Huffman MD    1 month ago Persistent migraine aura without cerebral infarction and without status migrainosus, not intractable    [facility] Jose M Alberto MD    1 month ago Hypertension, unspecified type    [facility] Fede Huffman MD    2 months ago Hypertension, unspecified type    [facility] - Internal Medicine Fede Huffman MD    3 months ago Elevated BP without diagnosis of hypertension    [facility] - Internal Medicine Fede Huffman MD                   sildenafiL (VIAGRA) 50 MG tablet 30 tablet 2     Sig: Take 1 tablet (50 mg total) by mouth daily as  needed for Erectile Dysfunction.       Urology: Erectile Dysfunction Agents Failed - 3/31/2020  2:16 PM        Failed - Last BP in normal range within 360 days.     BP Readings from Last 3 Encounters:   03/03/20 (!) 125/92   02/04/20 (!) 170/110   01/07/20 (!) 170/114              Passed - Patient is at least 18 years old        Passed - Nitrates are not on the active medication list        Passed - Office visit in past 12 months or future 90 days.     Recent Outpatient Visits            4 weeks ago Hypertension, unspecified type    [facility] - Internal Medicine Fede Huffman MD    1 month ago Persistent migraine aura without cerebral infarction and without status migrainosus, not intractable    [facility] - Ophthalmology Jose M Alberto MD    1 month ago Hypertension, unspecified type    [facility] Fede Huffman MD    2 months ago Hypertension, unspecified type    [facility] Internal Medicine Fede Huffman MD    3 months ago Elevated BP without diagnosis of hypertension    [facility] Internal Medicine Fede Huffman MD                   amLODIPine (NORVASC) 5 MG tablet 90 tablet 0     Sig: Take 1 tablet (5 mg total) by mouth once daily.       Cardiovascular:  Calcium Channel Blockers Failed - 3/31/2020  2:16 PM        Failed - Last BP in normal range within 360 days.     BP Readings from Last 3 Encounters:   03/03/20 (!) 125/92   02/04/20 (!) 170/110   01/07/20 (!) 170/114              Passed - Patient is at least 18 years old        Passed - Office visit in past 12 months or future 90 days.     Recent Outpatient Visits            4 weeks ago Hypertension, unspecified type    [facility] Internal Medicine Fede Huffman MD    1 month ago Persistent migraine aura without cerebral infarction and without status migrainosus, not intractable    [facility] - Ophthalmology Jose M Alberto MD    1 month ago Hypertension, unspecified type    [facility] Internal Medicine Fede Huffman MD    2  months ago Hypertension, unspecified type    [facility] Fede Huffman MD    3 months ago Elevated BP without diagnosis of hypertension    [facility] Fede Huffman MD                   losartan (COZAAR) 100 MG tablet 90 tablet 0     Sig: Take 1 tablet (100 mg total) by mouth once daily.       Cardiovascular:  Angiotensin Receptor Blockers Failed - 3/31/2020  2:16 PM        Failed - Last BP in normal range within 360 days.     BP Readings from Last 3 Encounters:   03/03/20 (!) 125/92   02/04/20 (!) 170/110   01/07/20 (!) 170/114              Passed - Patient is at least 18 years old        Passed - Office visit in past 12 months or future 90 days.     Recent Outpatient Visits            4 weeks ago Hypertension, unspecified type    [facility] Fede Huffman MD    1 month ago Persistent migraine aura without cerebral infarction and without status migrainosus, not intractable    [facility] - Ophthalmology Jose M Alberto MD    1 month ago Hypertension, unspecified type    [facility] Fede Huffman MD    2 months ago Hypertension, unspecified type    [facility] Fede Huffman MD    3 months ago Elevated BP without diagnosis of hypertension    [facility] Fede Huffman MD                    Passed - Cr is 1.3 or below and within 360 days     Creatinine   Date Value Ref Range Status   12/05/2019 1.2 0.5 - 1.4 mg/dL Final              Passed - K in normal range and within 360 days     Potassium   Date Value Ref Range Status   12/05/2019 4.0 3.5 - 5.1 mmol/L Final              Passed - eGFR within 360 days     eGFR if non    Date Value Ref Range Status   12/05/2019 >60 >60 mL/min/1.73 m^2 Final     Comment:     Calculation used to obtain the estimated glomerular filtration  rate (eGFR) is the CKD-EPI equation.        eGFR if    Date Value Ref Range Status   12/05/2019 >60  >60 mL/min/1.73 m^2 Final               Appointments  past 12m or future 3m with PCP    Date Provider   Last Visit   3/3/2020 Fede Huffman MD   Next Visit   Visit date not found Fede Huffman MD   .  ED visits in past 90 days: 0       Note composed:2:23 PM 03/31/2020

## 2020-04-01 RX ORDER — PROPRANOLOL HYDROCHLORIDE 10 MG/1
10 TABLET ORAL 3 TIMES DAILY PRN
Qty: 90 TABLET | Refills: 3 | Status: SHIPPED | OUTPATIENT
Start: 2020-04-01 | End: 2020-05-09 | Stop reason: SDUPTHER

## 2020-05-09 ENCOUNTER — PATIENT MESSAGE (OUTPATIENT)
Dept: INTERNAL MEDICINE | Facility: CLINIC | Age: 30
End: 2020-05-09

## 2020-05-09 DIAGNOSIS — I10 HYPERTENSION, UNSPECIFIED TYPE: ICD-10-CM

## 2020-05-09 DIAGNOSIS — G25.2 INTENTION TREMOR: ICD-10-CM

## 2020-05-11 RX ORDER — LOSARTAN POTASSIUM 100 MG/1
100 TABLET ORAL DAILY
Qty: 90 TABLET | Refills: 3 | Status: SHIPPED | OUTPATIENT
Start: 2020-05-11 | End: 2023-02-15

## 2020-05-11 RX ORDER — PROPRANOLOL HYDROCHLORIDE 10 MG/1
10 TABLET ORAL 3 TIMES DAILY PRN
Qty: 90 TABLET | Refills: 3 | Status: SHIPPED | OUTPATIENT
Start: 2020-05-11 | End: 2023-02-15 | Stop reason: SDUPTHER

## 2020-05-11 RX ORDER — AMLODIPINE BESYLATE 5 MG/1
5 TABLET ORAL DAILY
Qty: 90 TABLET | Refills: 3 | Status: SHIPPED | OUTPATIENT
Start: 2020-05-11 | End: 2023-02-15

## 2021-04-05 ENCOUNTER — PATIENT MESSAGE (OUTPATIENT)
Dept: ADMINISTRATIVE | Facility: HOSPITAL | Age: 31
End: 2021-04-05

## 2021-07-06 ENCOUNTER — PATIENT MESSAGE (OUTPATIENT)
Dept: ADMINISTRATIVE | Facility: HOSPITAL | Age: 31
End: 2021-07-06

## 2021-10-04 ENCOUNTER — PATIENT MESSAGE (OUTPATIENT)
Dept: ADMINISTRATIVE | Facility: HOSPITAL | Age: 31
End: 2021-10-04

## 2022-03-16 ENCOUNTER — PATIENT MESSAGE (OUTPATIENT)
Dept: ADMINISTRATIVE | Facility: HOSPITAL | Age: 32
End: 2022-03-16
Payer: COMMERCIAL

## 2023-02-15 ENCOUNTER — OFFICE VISIT (OUTPATIENT)
Dept: INTERNAL MEDICINE | Facility: CLINIC | Age: 33
End: 2023-02-15
Attending: FAMILY MEDICINE
Payer: COMMERCIAL

## 2023-02-15 VITALS
HEART RATE: 88 BPM | DIASTOLIC BLOOD PRESSURE: 80 MMHG | BODY MASS INDEX: 39.03 KG/M2 | SYSTOLIC BLOOD PRESSURE: 110 MMHG | HEIGHT: 75 IN | WEIGHT: 313.94 LBS | OXYGEN SATURATION: 97 %

## 2023-02-15 DIAGNOSIS — G89.29 CHRONIC LEFT-SIDED LOW BACK PAIN WITH LEFT-SIDED SCIATICA: ICD-10-CM

## 2023-02-15 DIAGNOSIS — I61.0 NONTRAUMATIC SUBCORTICAL HEMORRHAGE OF RIGHT CEREBRAL HEMISPHERE: ICD-10-CM

## 2023-02-15 DIAGNOSIS — M54.42 CHRONIC LEFT-SIDED LOW BACK PAIN WITH LEFT-SIDED SCIATICA: ICD-10-CM

## 2023-02-15 DIAGNOSIS — I10 HYPERTENSION, ESSENTIAL: ICD-10-CM

## 2023-02-15 DIAGNOSIS — G25.2 INTENTION TREMOR: ICD-10-CM

## 2023-02-15 DIAGNOSIS — I10 HYPERTENSION, ESSENTIAL: Primary | ICD-10-CM

## 2023-02-15 DIAGNOSIS — K64.1 GRADE II HEMORRHOIDS: ICD-10-CM

## 2023-02-15 DIAGNOSIS — Z00.00 PREVENTATIVE HEALTH CARE: Primary | ICD-10-CM

## 2023-02-15 PROCEDURE — 1160F PR REVIEW ALL MEDS BY PRESCRIBER/CLIN PHARMACIST DOCUMENTED: ICD-10-PCS | Mod: CPTII,S$GLB,, | Performed by: FAMILY MEDICINE

## 2023-02-15 PROCEDURE — 99999 PR PBB SHADOW E&M-EST. PATIENT-LVL IV: ICD-10-PCS | Mod: PBBFAC,,, | Performed by: FAMILY MEDICINE

## 2023-02-15 PROCEDURE — 4010F ACE/ARB THERAPY RXD/TAKEN: CPT | Mod: CPTII,S$GLB,, | Performed by: FAMILY MEDICINE

## 2023-02-15 PROCEDURE — 3074F SYST BP LT 130 MM HG: CPT | Mod: CPTII,S$GLB,, | Performed by: FAMILY MEDICINE

## 2023-02-15 PROCEDURE — 3074F PR MOST RECENT SYSTOLIC BLOOD PRESSURE < 130 MM HG: ICD-10-PCS | Mod: CPTII,S$GLB,, | Performed by: FAMILY MEDICINE

## 2023-02-15 PROCEDURE — 99385 PREV VISIT NEW AGE 18-39: CPT | Mod: S$GLB,,, | Performed by: FAMILY MEDICINE

## 2023-02-15 PROCEDURE — 3079F PR MOST RECENT DIASTOLIC BLOOD PRESSURE 80-89 MM HG: ICD-10-PCS | Mod: CPTII,S$GLB,, | Performed by: FAMILY MEDICINE

## 2023-02-15 PROCEDURE — 99385 PR PREVENTIVE VISIT,NEW,18-39: ICD-10-PCS | Mod: S$GLB,,, | Performed by: FAMILY MEDICINE

## 2023-02-15 PROCEDURE — 1159F PR MEDICATION LIST DOCUMENTED IN MEDICAL RECORD: ICD-10-PCS | Mod: CPTII,S$GLB,, | Performed by: FAMILY MEDICINE

## 2023-02-15 PROCEDURE — 1160F RVW MEDS BY RX/DR IN RCRD: CPT | Mod: CPTII,S$GLB,, | Performed by: FAMILY MEDICINE

## 2023-02-15 PROCEDURE — 3008F BODY MASS INDEX DOCD: CPT | Mod: CPTII,S$GLB,, | Performed by: FAMILY MEDICINE

## 2023-02-15 PROCEDURE — 3079F DIAST BP 80-89 MM HG: CPT | Mod: CPTII,S$GLB,, | Performed by: FAMILY MEDICINE

## 2023-02-15 PROCEDURE — 4010F PR ACE/ARB THEARPY RXD/TAKEN: ICD-10-PCS | Mod: CPTII,S$GLB,, | Performed by: FAMILY MEDICINE

## 2023-02-15 PROCEDURE — 1159F MED LIST DOCD IN RCRD: CPT | Mod: CPTII,S$GLB,, | Performed by: FAMILY MEDICINE

## 2023-02-15 PROCEDURE — 3008F PR BODY MASS INDEX (BMI) DOCUMENTED: ICD-10-PCS | Mod: CPTII,S$GLB,, | Performed by: FAMILY MEDICINE

## 2023-02-15 PROCEDURE — 99999 PR PBB SHADOW E&M-EST. PATIENT-LVL IV: CPT | Mod: PBBFAC,,, | Performed by: FAMILY MEDICINE

## 2023-02-15 RX ORDER — AMLODIPINE BESYLATE 10 MG/1
10 TABLET ORAL DAILY
Qty: 90 TABLET | Refills: 3 | Status: SHIPPED | OUTPATIENT
Start: 2023-02-15 | End: 2023-04-18 | Stop reason: SDUPTHER

## 2023-02-15 RX ORDER — OLMESARTAN MEDOXOMIL 40 MG/1
40 TABLET ORAL DAILY
Qty: 90 TABLET | Refills: 3 | Status: SHIPPED | OUTPATIENT
Start: 2023-02-15 | End: 2023-04-18 | Stop reason: SDUPTHER

## 2023-02-15 RX ORDER — OLMESARTAN MEDOXOMIL, AMLODIPINE AND HYDROCHLOROTHIAZIDE TABLET 40/5/25 MG 40; 5; 25 MG/1; MG/1; MG/1
TABLET ORAL
COMMUNITY
End: 2023-02-15

## 2023-02-15 RX ORDER — PROPRANOLOL HYDROCHLORIDE 10 MG/1
10 TABLET ORAL 3 TIMES DAILY PRN
Qty: 90 TABLET | Refills: 3 | Status: SHIPPED | OUTPATIENT
Start: 2023-02-15 | End: 2023-09-07 | Stop reason: SDUPTHER

## 2023-02-15 RX ORDER — OLMESARTAN MEDOXOMIL / AMLODIPINE BESYLATE / HYDROCHLOROTHIAZIDE 40; 10; 12.5 MG/1; MG/1; MG/1
1 TABLET, FILM COATED ORAL DAILY
Qty: 90 TABLET | Refills: 3 | Status: SHIPPED | OUTPATIENT
Start: 2023-02-15 | End: 2023-11-08

## 2023-02-15 RX ORDER — HYDROCHLOROTHIAZIDE 12.5 MG/1
12.5 TABLET ORAL DAILY
Qty: 90 TABLET | Refills: 3 | Status: SHIPPED | OUTPATIENT
Start: 2023-02-15 | End: 2023-06-23

## 2023-02-15 NOTE — TELEPHONE ENCOUNTER
Care Due:                  Date            Visit Type   Department     Provider  --------------------------------------------------------------------------------                                NP -                              PRIMARY      [facility] INTERNAL  Stalin Lorenz  Last Visit: 02-      [facility]   [facility]  Next Visit: None Scheduled  None         None Found                                                            Last  Test          Frequency    Reason                     Performed    Due Date  --------------------------------------------------------------------------------    CMP.........  12 months..  olmesartan-amLODIPin-hcth  Not Found    Overdue                             chaim....................    [employer] Embedded Care Gaps. Reference number: [id number]. 2/15/2023   4:48:08 PM CST

## 2023-02-15 NOTE — TELEPHONE ENCOUNTER
----- Message from Danny Dharmesh sent at 2/15/2023  4:18 PM CST -----  Regarding: [facility] [phone]  Type: Patient Call Back    Who called: [facility]     What is the request in detail: called stating the insurance will not cover the prescription for olmesartan-amLODIPin-hcthiazid 40-10-12.5 mg Tab and the pharmacy stated to try to send an different prescription for each of the medications listed instead of all listed at once.     Can the clinic reply by [facility]? No     Would the patient rather a call back or a response via [facility]? Call back     Best call back number:  [phone]    Additional Information:    Thank you.

## 2023-02-15 NOTE — PROGRESS NOTES
"CHIEF COMPLAINT: Establish a primary care physician    HISTORY OF PRESENT ILLNESS: The patient is a 32 year-old WM.  The patient is Healthy except ruptured aneurysm 2021 d/t HTN.    He also has Left sciatica for several months.    He will need to see CRS for significant hemorrhoids.    ED responds nicely to viagra prn    It has been a while since the patient has seen a primary care physician.  The patient wishes to establish a primary care physician.  The patient would also like to get some basic blood work done.    REVIEW OF SYSTEMS:  GENERAL: No fever, chills, fatigability or weight loss.  SKIN: No rashes, itching or changes in color or texture of skin.  HEAD: No headaches or recent head trauma.  EYES: Visual acuity fine. No photophobia, ocular pain or diplopia.  EARS: Denies ear pain, discharge or vertigo.  NOSE: No loss of smell, no epistaxis or postnasal drip.  MOUTH & THROAT: No hoarseness or change in voice. No excessive gum bleeding.  NODES: Denies swollen glands.  CHEST: Denies CUMMINGS, cyanosis, wheezing, cough and sputum production.  CARDIOVASCULAR: Denies chest pain, PND, orthopnea or reduced exercise tolerance.  ABDOMEN: Appetite fine. No weight loss. Denies diarrhea, abdominal pain, hematemesis.  URINARY: No flank pain, dysuria or hematuria.  PERIPHERAL VASCULAR: No claudication or cyanosis.  MUSCULOSKELETAL: No joint stiffness or swelling.   NEUROLOGIC: No history of seizures, paralysis, alteration of gait or coordination.    SOCIAL HISTORY: The patient does not smoke.  The patient consumes alcohol socially.  The patient is single.    PHYSICAL EXAMINATION:   Blood pressure 110/80, pulse 88, height 6' 3" (1.905 m), weight (!) 142.4 kg (313 lb 15 oz), SpO2 97 %.  APPEARANCE: Well nourished, well developed, in no acute distress.    HEAD: Normocephalic, atraumatic.  EYES: PERRL. EOMI.  Conjunctivae without injection and  anicteric  NOSE: Mucosa pink. Airway clear.  MOUTH & THROAT: No tonsillar enlargement. No " pharyngeal erythema or exudate. No stridor.  NECK: Supple.   NODES: No cervical, axillary or inguinal lymph node enlargement.  CHEST: Lungs clear to auscultation.  No retractions are noted.  No rales or rhonchi are present.  CARDIOVASCULAR: Normal S1, S2. No rubs, murmurs or gallops.  ABDOMEN: Bowel sounds normal. Not distended. Soft. No tenderness or masses.  No ascites is noted.  MUSCULOSKELETAL:  There is no clubbing, cyanosis, or edema of the extremities x4.  There is full range of motion of the lumbar spine.  There is full range of motion of the extremities x4.  There is no deformity noted.    NEUROLOGIC:       Normal speech development.      Hearing normal.      Normal gait.      Motor and sensory exams grossly normal.  PSYCHIATRIC: Patient is alert and oriented x3.  Thought processes are all normal.  There is no homicidality.  There is no suicidality.  There is no evidence of psychosis.    LABORATORY/RADIOLOGY:   Chart reviewed.  We will update blood work today.    ASSESSMENT:   Annual  ruptured aneurysm 2021 d/t HTN  Left sciatica for several months  CRS   ED     PLAN:  We will follow-up blood work which we expect to be normal.  Neurosurgery referral and meds refilled  Orthopedics  CRS  viagra prn  Return to clinic in one year.

## 2023-02-16 ENCOUNTER — PATIENT MESSAGE (OUTPATIENT)
Dept: ORTHOPEDICS | Facility: CLINIC | Age: 33
End: 2023-02-16
Payer: COMMERCIAL

## 2023-02-16 ENCOUNTER — TELEPHONE (OUTPATIENT)
Dept: INTERNAL MEDICINE | Facility: CLINIC | Age: 33
End: 2023-02-16
Payer: COMMERCIAL

## 2023-02-16 NOTE — TELEPHONE ENCOUNTER
Please advise if you will like to choose and alternative medication below. Routed to Dr. Gordillo for approval. Thanks.   \

## 2023-02-17 ENCOUNTER — LAB VISIT (OUTPATIENT)
Dept: LAB | Facility: OTHER | Age: 33
End: 2023-02-17
Attending: FAMILY MEDICINE
Payer: COMMERCIAL

## 2023-02-17 ENCOUNTER — TELEPHONE (OUTPATIENT)
Dept: INTERNAL MEDICINE | Facility: CLINIC | Age: 33
End: 2023-02-17
Payer: COMMERCIAL

## 2023-02-17 DIAGNOSIS — Z00.00 PREVENTATIVE HEALTH CARE: ICD-10-CM

## 2023-02-17 DIAGNOSIS — I10 HYPERTENSION, ESSENTIAL: ICD-10-CM

## 2023-02-17 LAB
ALBUMIN SERPL BCP-MCNC: 4.3 G/DL (ref 3.5–5.2)
ALP SERPL-CCNC: 78 U/L (ref 55–135)
ALT SERPL W/O P-5'-P-CCNC: 46 U/L (ref 10–44)
ANION GAP SERPL CALC-SCNC: 8 MMOL/L (ref 8–16)
AST SERPL-CCNC: 23 U/L (ref 10–40)
BILIRUB SERPL-MCNC: 0.5 MG/DL (ref 0.1–1)
BUN SERPL-MCNC: 11 MG/DL (ref 6–20)
CALCIUM SERPL-MCNC: 9.6 MG/DL (ref 8.7–10.5)
CHLORIDE SERPL-SCNC: 102 MMOL/L (ref 95–110)
CHOLEST SERPL-MCNC: 239 MG/DL (ref 120–199)
CHOLEST/HDLC SERPL: 8 {RATIO} (ref 2–5)
CO2 SERPL-SCNC: 27 MMOL/L (ref 23–29)
CREAT SERPL-MCNC: 1 MG/DL (ref 0.5–1.4)
EST. GFR  (NO RACE VARIABLE): >60 ML/MIN/1.73 M^2
ESTIMATED AVG GLUCOSE: 111 MG/DL (ref 68–131)
GLUCOSE SERPL-MCNC: 119 MG/DL (ref 70–110)
HBA1C MFR BLD: 5.5 % (ref 4–5.6)
HDLC SERPL-MCNC: 30 MG/DL (ref 40–75)
HDLC SERPL: 12.6 % (ref 20–50)
LDLC SERPL CALC-MCNC: 145.4 MG/DL (ref 63–159)
NONHDLC SERPL-MCNC: 209 MG/DL
POTASSIUM SERPL-SCNC: 3.6 MMOL/L (ref 3.5–5.1)
PROT SERPL-MCNC: 7.2 G/DL (ref 6–8.4)
SODIUM SERPL-SCNC: 137 MMOL/L (ref 136–145)
TRIGL SERPL-MCNC: 318 MG/DL (ref 30–150)
TSH SERPL DL<=0.005 MIU/L-ACNC: 1.79 UIU/ML (ref 0.4–4)

## 2023-02-17 PROCEDURE — 80053 COMPREHEN METABOLIC PANEL: CPT | Performed by: FAMILY MEDICINE

## 2023-02-17 PROCEDURE — 83036 HEMOGLOBIN GLYCOSYLATED A1C: CPT | Performed by: FAMILY MEDICINE

## 2023-02-17 PROCEDURE — 84443 ASSAY THYROID STIM HORMONE: CPT | Performed by: FAMILY MEDICINE

## 2023-02-17 PROCEDURE — 36415 COLL VENOUS BLD VENIPUNCTURE: CPT | Performed by: FAMILY MEDICINE

## 2023-02-17 PROCEDURE — 80061 LIPID PANEL: CPT | Performed by: FAMILY MEDICINE

## 2023-02-17 NOTE — TELEPHONE ENCOUNTER
Previous message sent was signed by staff. Second attempt of sending this message to Dr. Gordillo. Thanks.     Please advise if you will like to choose and alternative medication below. Routed to Dr. Gordillo for approval. Thanks.

## 2023-02-24 ENCOUNTER — OFFICE VISIT (OUTPATIENT)
Dept: SURGERY | Facility: CLINIC | Age: 33
End: 2023-02-24
Payer: COMMERCIAL

## 2023-02-24 VITALS
HEART RATE: 95 BPM | SYSTOLIC BLOOD PRESSURE: 119 MMHG | HEIGHT: 75 IN | RESPIRATION RATE: 19 BRPM | WEIGHT: 314.5 LBS | BODY MASS INDEX: 39.1 KG/M2 | OXYGEN SATURATION: 100 % | DIASTOLIC BLOOD PRESSURE: 83 MMHG

## 2023-02-24 DIAGNOSIS — K64.1 GRADE II HEMORRHOIDS: ICD-10-CM

## 2023-02-24 DIAGNOSIS — K62.89 ANAL PAIN: Primary | ICD-10-CM

## 2023-02-24 DIAGNOSIS — M51.36 DDD (DEGENERATIVE DISC DISEASE), LUMBAR: Primary | ICD-10-CM

## 2023-02-24 PROCEDURE — 3008F BODY MASS INDEX DOCD: CPT | Mod: CPTII,S$GLB,, | Performed by: SURGERY

## 2023-02-24 PROCEDURE — 3044F PR MOST RECENT HEMOGLOBIN A1C LEVEL <7.0%: ICD-10-PCS | Mod: CPTII,S$GLB,, | Performed by: SURGERY

## 2023-02-24 PROCEDURE — 3079F DIAST BP 80-89 MM HG: CPT | Mod: CPTII,S$GLB,, | Performed by: SURGERY

## 2023-02-24 PROCEDURE — 3074F PR MOST RECENT SYSTOLIC BLOOD PRESSURE < 130 MM HG: ICD-10-PCS | Mod: CPTII,S$GLB,, | Performed by: SURGERY

## 2023-02-24 PROCEDURE — 99999 PR PBB SHADOW E&M-EST. PATIENT-LVL IV: ICD-10-PCS | Mod: PBBFAC,,, | Performed by: SURGERY

## 2023-02-24 PROCEDURE — 4010F PR ACE/ARB THEARPY RXD/TAKEN: ICD-10-PCS | Mod: CPTII,S$GLB,, | Performed by: SURGERY

## 2023-02-24 PROCEDURE — 99203 PR OFFICE/OUTPT VISIT, NEW, LEVL III, 30-44 MIN: ICD-10-PCS | Mod: S$GLB,,, | Performed by: SURGERY

## 2023-02-24 PROCEDURE — 3074F SYST BP LT 130 MM HG: CPT | Mod: CPTII,S$GLB,, | Performed by: SURGERY

## 2023-02-24 PROCEDURE — 3008F PR BODY MASS INDEX (BMI) DOCUMENTED: ICD-10-PCS | Mod: CPTII,S$GLB,, | Performed by: SURGERY

## 2023-02-24 PROCEDURE — 3044F HG A1C LEVEL LT 7.0%: CPT | Mod: CPTII,S$GLB,, | Performed by: SURGERY

## 2023-02-24 PROCEDURE — 4010F ACE/ARB THERAPY RXD/TAKEN: CPT | Mod: CPTII,S$GLB,, | Performed by: SURGERY

## 2023-02-24 PROCEDURE — 99999 PR PBB SHADOW E&M-EST. PATIENT-LVL IV: CPT | Mod: PBBFAC,,, | Performed by: SURGERY

## 2023-02-24 PROCEDURE — 3079F PR MOST RECENT DIASTOLIC BLOOD PRESSURE 80-89 MM HG: ICD-10-PCS | Mod: CPTII,S$GLB,, | Performed by: SURGERY

## 2023-02-24 PROCEDURE — 99203 OFFICE O/P NEW LOW 30 MIN: CPT | Mod: S$GLB,,, | Performed by: SURGERY

## 2023-02-24 NOTE — PROGRESS NOTES
"CRS Office Visit History and Physical    Referring Md:   Stalin Gordillo Md  [address]  [facility] [address]  [place] [address]    SUBJECTIVE:     Chief Complaint: anal pain and bleeding    History of Present Illness:  The patient is a new patient to this practice.   Course is as follows:  Scott Gallagher is a 32 y.o. male presents with anal pain and bleeding.  This occurs after attempting anal receptive intercourse.  He does not have pain or bleeding with defecation. He reports adequate use of lubrication.  No history of anal surgery.  No family history of inflammatory bowel disease or colon cancer.      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       Past Medical History:   Diagnosis Date    Hypertension      Past Surgical History:   Procedure Laterality Date    APPENDECTOMY  2004    WISDOM TOOTH EXTRACTION  2011     Family History   Problem Relation Age of Onset    Hypertension Mother     Diabetes type II Mother     Hypertension Father     Diabetes type II Father     Hypertension Sister 28    Hypertension Brother     Hypertension Brother     Hypertension Brother     Hypertension Sister      Social History     Tobacco Use    Smoking status: Never    Smokeless tobacco: Never   Substance Use Topics    Alcohol use: Yes    Drug use: Yes     Types: Marijuana     Comment: very rarely used, edible        Review of Systems:  Review of Systems   All other systems reviewed and are negative.    OBJECTIVE:     Vital Signs (Most Recent)  /83 (BP Location: Left arm, Patient Position: Sitting)   Pulse 95   Resp 19   Ht 6' 3" (1.905 m)   Wt (!) 142.7 kg (314 lb 7.8 oz)   SpO2 100%   BMI 39.31 kg/m²     Physical Exam:  General: 32 y.o. male in no distress   Neuro: alert and " oriented x 4.  Moves all extremities.     HEENT: normocephalic, atraumatic, PERRL, EOMI   Respiratory: respirations are even and unlabored  Cardiac: regular rate and rhythm  Abdomen: soft, NTND  Extremities: Warm dry and intact  Skin: no rashes  Anorectal: scar in anterior midline without evidence of chronic fissure, increased tone on SHELBY, no masses or blood     Anoscopy:   Verbal consent obtained.   A lubricated anoscope was inserted and circumferential inspection performed.  No fissure visualized.  No masses.  The scope was withdrawn.     Labs: NA    Imaging: NA      ASSESSMENT/PLAN:     Diagnoses and all orders for this visit:    Anal pain  -     Ambulatory referral/consult to Physical/Occupational Therapy; Future    Grade II hemorrhoids  -     Ambulatory referral/consult to Colorectal Surgery        32 y.o. male with anal pain and bleeding with anal receptive intercourse    - Patient without obvious anorectal pathology causing pain and bleeding.  No evidence of active anal fissure.  Does have scar in anterior midline, which is where he reports bloody drainage.  No obvious anal fistula on exam. If pain/swelling recurs, encouraged patient to return to clinic for further evaluation   - Placed referral to pelvic floor PT due to increased rectal tone for relaxation techniques.   - follow up PRN     Sun Finley MD  Staff Surgeon  Colon & Rectal Surgery

## 2023-02-27 ENCOUNTER — PATIENT MESSAGE (OUTPATIENT)
Dept: INTERNAL MEDICINE | Facility: CLINIC | Age: 33
End: 2023-02-27
Payer: COMMERCIAL

## 2023-02-27 ENCOUNTER — HOSPITAL ENCOUNTER (OUTPATIENT)
Dept: RADIOLOGY | Facility: HOSPITAL | Age: 33
Discharge: HOME | End: 2023-02-27
Attending: ORTHOPAEDIC SURGERY
Payer: COMMERCIAL

## 2023-02-27 ENCOUNTER — OFFICE VISIT (OUTPATIENT)
Dept: ORTHOPEDICS | Facility: CLINIC | Age: 33
End: 2023-02-27
Payer: COMMERCIAL

## 2023-02-27 VITALS — BODY MASS INDEX: 38.73 KG/M2 | HEIGHT: 75 IN | WEIGHT: 311.5 LBS

## 2023-02-27 DIAGNOSIS — M54.42 CHRONIC LEFT-SIDED LOW BACK PAIN WITH LEFT-SIDED SCIATICA: ICD-10-CM

## 2023-02-27 DIAGNOSIS — I10 HYPERTENSION, ESSENTIAL: Primary | ICD-10-CM

## 2023-02-27 DIAGNOSIS — G89.29 CHRONIC LEFT-SIDED LOW BACK PAIN WITH LEFT-SIDED SCIATICA: ICD-10-CM

## 2023-02-27 DIAGNOSIS — M51.36 DDD (DEGENERATIVE DISC DISEASE), LUMBAR: ICD-10-CM

## 2023-02-27 PROCEDURE — 72110 X-RAY EXAM L-2 SPINE 4/>VWS: CPT | Mod: TC

## 2023-02-27 PROCEDURE — 72110 X-RAY EXAM L-2 SPINE 4/>VWS: CPT | Mod: 26,,, | Performed by: RADIOLOGY

## 2023-02-27 PROCEDURE — 4010F PR ACE/ARB THEARPY RXD/TAKEN: ICD-10-PCS | Mod: CPTII,S$GLB,, | Performed by: ORTHOPAEDIC SURGERY

## 2023-02-27 PROCEDURE — 72110 XR LUMBAR SPINE AP AND LAT WITH FLEX/EXT: ICD-10-PCS | Mod: 26,,, | Performed by: RADIOLOGY

## 2023-02-27 PROCEDURE — 99999 PR PBB SHADOW E&M-EST. PATIENT-LVL III: CPT | Mod: PBBFAC,,, | Performed by: ORTHOPAEDIC SURGERY

## 2023-02-27 PROCEDURE — 3044F HG A1C LEVEL LT 7.0%: CPT | Mod: CPTII,S$GLB,, | Performed by: ORTHOPAEDIC SURGERY

## 2023-02-27 PROCEDURE — 3044F PR MOST RECENT HEMOGLOBIN A1C LEVEL <7.0%: ICD-10-PCS | Mod: CPTII,S$GLB,, | Performed by: ORTHOPAEDIC SURGERY

## 2023-02-27 PROCEDURE — 1159F MED LIST DOCD IN RCRD: CPT | Mod: CPTII,S$GLB,, | Performed by: ORTHOPAEDIC SURGERY

## 2023-02-27 PROCEDURE — 99999 PR PBB SHADOW E&M-EST. PATIENT-LVL III: ICD-10-PCS | Mod: PBBFAC,,, | Performed by: ORTHOPAEDIC SURGERY

## 2023-02-27 PROCEDURE — 4010F ACE/ARB THERAPY RXD/TAKEN: CPT | Mod: CPTII,S$GLB,, | Performed by: ORTHOPAEDIC SURGERY

## 2023-02-27 PROCEDURE — 99214 PR OFFICE/OUTPT VISIT, EST, LEVL IV, 30-39 MIN: ICD-10-PCS | Mod: S$GLB,,, | Performed by: ORTHOPAEDIC SURGERY

## 2023-02-27 PROCEDURE — 3008F BODY MASS INDEX DOCD: CPT | Mod: CPTII,S$GLB,, | Performed by: ORTHOPAEDIC SURGERY

## 2023-02-27 PROCEDURE — 99214 OFFICE O/P EST MOD 30 MIN: CPT | Mod: S$GLB,,, | Performed by: ORTHOPAEDIC SURGERY

## 2023-02-27 PROCEDURE — 1159F PR MEDICATION LIST DOCUMENTED IN MEDICAL RECORD: ICD-10-PCS | Mod: CPTII,S$GLB,, | Performed by: ORTHOPAEDIC SURGERY

## 2023-02-27 PROCEDURE — 3008F PR BODY MASS INDEX (BMI) DOCUMENTED: ICD-10-PCS | Mod: CPTII,S$GLB,, | Performed by: ORTHOPAEDIC SURGERY

## 2023-02-27 RX ORDER — GABAPENTIN 300 MG/1
300 CAPSULE ORAL NIGHTLY
Qty: 30 CAPSULE | Refills: 11 | Status: SHIPPED | OUTPATIENT
Start: 2023-02-27 | End: 2024-03-17 | Stop reason: SDUPTHER

## 2023-02-27 RX ORDER — HYDROCHLOROTHIAZIDE 25 MG/1
25 TABLET ORAL DAILY
Qty: 30 TABLET | Refills: 11 | Status: SHIPPED | OUTPATIENT
Start: 2023-02-27 | End: 2023-04-18 | Stop reason: SDUPTHER

## 2023-02-27 NOTE — PROGRESS NOTES
DATE: 2/27/2023  PATIENT: Scott Gallagher    Supervising Physician: Raleigh Bond M.D.    CHIEF COMPLAINT: low back and left leg pain    HISTORY:  Scott Gallagher is a 32 y.o. male with a pmhx of hemorrhagic stroke here for initial evaluation of low back and left leg pain (Back - 9, Leg - 9).  The pain in the left side of the lower back and down the back of the left leg is what bothers him most.  The back pain has been present for years and the left leg pain started 4-5 months ago with no specific injury. The patient describes the pain as shooting.  The pain is worse with standing from a seated position and lifting his left leg and improved by nothing. There is negative associated numbness and tingling. There is positive subjective weakness. Prior treatments have included chiropractic rx, but no PT, ESIs, surgery.    The patient denies myelopathic symptoms such as handwriting changes or difficulty with buttons/coins/keys. Denies perineal paresthesias, bowel/bladder dysfunction.    PAST MEDICAL/SURGICAL HISTORY:  Past Medical History:   Diagnosis Date    Hypertension      Past Surgical History:   Procedure Laterality Date    APPENDECTOMY  2004    WISDOM TOOTH EXTRACTION  2011       Medications:   Current Outpatient Medications on File Prior to Visit   Medication Sig Dispense Refill    amLODIPine (NORVASC) 10 MG tablet Take 1 tablet (10 mg total) by mouth once daily. 90 tablet 3    hydroCHLOROthiazide (HYDRODIURIL) 12.5 MG Tab Take 1 tablet (12.5 mg total) by mouth once daily. 90 tablet 3    ibuprofen (ADVIL,MOTRIN) 200 MG tablet Take 200 mg by mouth every 6 (six) hours as needed for Pain.      olmesartan (BENICAR) 40 MG tablet Take 1 tablet (40 mg total) by mouth once daily. 90 tablet 3    olmesartan-amLODIPin-hcthiazid 40-10-12.5 mg Tab Take 1 tablet by mouth once daily. 90 tablet 3    propranoloL (INDERAL) 10 MG tablet Take 1 tablet (10 mg total) by mouth 3 (three) times daily as needed (for tremor). 90 tablet 3     sildenafiL (VIAGRA) 50 MG tablet Take 1 tablet (50 mg total) by mouth daily as needed for Erectile Dysfunction. (Patient not taking: Reported on 2/15/2023) 30 tablet 2     No current facility-administered medications on file prior to visit.       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 Negative for chills, fever and night sweats.   Cardiovascular: Negative for chest pain and syncope.   Respiratory: Negative for cough and shortness of breath.   Gastrointestinal: See HPI. Negative for nausea/vomiting. Negative for abdominal pain.  Genitourinary: See HPI. Negative for discoloration or dysuria.  Skin: Negative for dry skin, itching and rash.   Hematologic/Lymphatic: Negative for bleeding problem. Does not bruise/bleed easily.   Musculoskeletal: Negative for falls and muscle weakness.   Neurological: See HPI. No seizures.   Endocrine: Negative for polydipsia, polyphagia and polyuria.   Allergic/Immunologic: Negative for hives and persistent infections.     EXAM:  There were no vitals taken for this visit.    General: The patient is a very pleasant 32 y.o. male in no apparent distress, the patient is oriented to person, place and time.  Psych: Normal mood and affect  HEENT: Vision grossly intact, hearing intact to the spoken word.  Lungs: Respirations unlabored.  Gait: Antalgic station and gait, no difficulty with toe or heel walk.   Skin: Dorsal lumbar skin negative for rashes, lesions, hairy patches and surgical scars. There is negative lumbar tenderness to palpation.  Range of motion: Lumbar range of  motion is acceptable.  Spinal Balance: Global saggital and coronal spinal balance acceptable, not significant for scoliosis and kyphosis.  Musculoskeletal: No pain with the range of motion of the bilateral hips. No trochanteric tenderness to palpation.  Vascular: Bilateral lower extremities warm and well perfused, dorsalis pedis pulses 2+ bilaterally.  Neurological: Normal strength and tone in all major motor groups in the bilateral lower extremities. Normal sensation to light touch in the L2-S1 dermatomes bilaterally.  Deep tendon reflexes symmetric 2+ in the bilateral lower extremities.  Negative Babinski bilaterally. Straight leg raise positive on left    IMAGING:      Today I personally reviewed AP, Lat and Flex/Ex  upright L-spine films that demonstrate mild lumbosacral disc space narrowing      There is no height or weight on file to calculate BMI.    Hemoglobin A1C   Date Value Ref Range Status   02/17/2023 5.5 4.0 - 5.6 % Final     Comment:     ADA Screening Guidelines:  5.7-6.4%  Consistent with prediabetes  >or=6.5%  Consistent with diabetes    High levels of fetal hemoglobin interfere with the HbA1C  assay. Heterozygous hemoglobin variants (HbS, HgC, etc)do  not significantly interfere with this assay.   However, presence of multiple variants may affect accuracy.     12/05/2019 4.6 4.0 - 5.6 % Final     Comment:     ADA Screening Guidelines:  5.7-6.4%  Consistent with prediabetes  >or=6.5%  Consistent with diabetes  High levels of fetal hemoglobin interfere with the HbA1C  assay. Heterozygous hemoglobin variants (HbS, HgC, etc)do  not significantly interfere with this assay.   However, presence of multiple variants may affect accuracy.             ASSESSMENT/PLAN:    There are no diagnoses linked to this encounter.    Today we discussed at length all of the different treatment options including anti-inflammatories, acetaminophen, rest, ice, heat, physical therapy including strengthening and stretching  exercises, home exercises, ROM, aerobic conditioning, aqua therapy, other modalities including ultrasound, massage, and dry needling, epidural steroid injections and finally surgical intervention.      Pt presents with left lumbar radiculopathy. Will send gabapentin to pharmacy and PT orders to crane. Pt will fu if pain persists, will obtain MRI at that time.

## 2023-02-27 NOTE — TELEPHONE ENCOUNTER
No new care gaps identified.  [facility] Embedded Care Gaps. Reference number: [id number]. 2/27/2023   2:08:37 PM CST

## 2023-03-07 ENCOUNTER — PATIENT MESSAGE (OUTPATIENT)
Dept: ORTHOPEDICS | Facility: CLINIC | Age: 33
End: 2023-03-07
Payer: COMMERCIAL

## 2023-03-09 ENCOUNTER — PATIENT MESSAGE (OUTPATIENT)
Dept: INTERNAL MEDICINE | Facility: CLINIC | Age: 33
End: 2023-03-09
Payer: COMMERCIAL

## 2023-03-09 ENCOUNTER — PATIENT MESSAGE (OUTPATIENT)
Dept: NEUROSURGERY | Facility: CLINIC | Age: 33
End: 2023-03-09
Payer: COMMERCIAL

## 2023-03-10 VITALS — SYSTOLIC BLOOD PRESSURE: 110 MMHG | DIASTOLIC BLOOD PRESSURE: 80 MMHG

## 2023-03-20 RX ORDER — METHYLPREDNISOLONE 4 MG/1
TABLET ORAL
Qty: 1 EACH | Refills: 0 | Status: SHIPPED | OUTPATIENT
Start: 2023-03-20 | End: 2023-04-10

## 2023-03-30 ENCOUNTER — TELEPHONE (OUTPATIENT)
Dept: NEUROSURGERY | Facility: CLINIC | Age: 33
End: 2023-03-30
Payer: COMMERCIAL

## 2023-04-04 ENCOUNTER — OFFICE VISIT (OUTPATIENT)
Dept: NEUROSURGERY | Facility: CLINIC | Age: 33
End: 2023-04-04
Payer: COMMERCIAL

## 2023-04-04 VITALS
DIASTOLIC BLOOD PRESSURE: 83 MMHG | BODY MASS INDEX: 38.67 KG/M2 | HEIGHT: 75 IN | HEART RATE: 79 BPM | SYSTOLIC BLOOD PRESSURE: 128 MMHG | WEIGHT: 311 LBS

## 2023-04-04 DIAGNOSIS — I61.0 NONTRAUMATIC SUBCORTICAL HEMORRHAGE OF RIGHT CEREBRAL HEMISPHERE: ICD-10-CM

## 2023-04-04 DIAGNOSIS — I63.89 OTHER CEREBRAL INFARCTION: Primary | ICD-10-CM

## 2023-04-04 PROCEDURE — 3008F PR BODY MASS INDEX (BMI) DOCUMENTED: ICD-10-PCS | Mod: CPTII,S$GLB,, | Performed by: PHYSICIAN ASSISTANT

## 2023-04-04 PROCEDURE — 99999 PR PBB SHADOW E&M-EST. PATIENT-LVL III: CPT | Mod: PBBFAC,,, | Performed by: PHYSICIAN ASSISTANT

## 2023-04-04 PROCEDURE — 3074F SYST BP LT 130 MM HG: CPT | Mod: CPTII,S$GLB,, | Performed by: PHYSICIAN ASSISTANT

## 2023-04-04 PROCEDURE — 99999 PR PBB SHADOW E&M-EST. PATIENT-LVL III: ICD-10-PCS | Mod: PBBFAC,,, | Performed by: PHYSICIAN ASSISTANT

## 2023-04-04 PROCEDURE — 3044F HG A1C LEVEL LT 7.0%: CPT | Mod: CPTII,S$GLB,, | Performed by: PHYSICIAN ASSISTANT

## 2023-04-04 PROCEDURE — 99215 OFFICE O/P EST HI 40 MIN: CPT | Mod: S$GLB,,, | Performed by: PHYSICIAN ASSISTANT

## 2023-04-04 PROCEDURE — 3079F PR MOST RECENT DIASTOLIC BLOOD PRESSURE 80-89 MM HG: ICD-10-PCS | Mod: CPTII,S$GLB,, | Performed by: PHYSICIAN ASSISTANT

## 2023-04-04 PROCEDURE — 3008F BODY MASS INDEX DOCD: CPT | Mod: CPTII,S$GLB,, | Performed by: PHYSICIAN ASSISTANT

## 2023-04-04 PROCEDURE — 3079F DIAST BP 80-89 MM HG: CPT | Mod: CPTII,S$GLB,, | Performed by: PHYSICIAN ASSISTANT

## 2023-04-04 PROCEDURE — 4010F ACE/ARB THERAPY RXD/TAKEN: CPT | Mod: CPTII,S$GLB,, | Performed by: PHYSICIAN ASSISTANT

## 2023-04-04 PROCEDURE — 3044F PR MOST RECENT HEMOGLOBIN A1C LEVEL <7.0%: ICD-10-PCS | Mod: CPTII,S$GLB,, | Performed by: PHYSICIAN ASSISTANT

## 2023-04-04 PROCEDURE — 4010F PR ACE/ARB THEARPY RXD/TAKEN: ICD-10-PCS | Mod: CPTII,S$GLB,, | Performed by: PHYSICIAN ASSISTANT

## 2023-04-04 PROCEDURE — 99215 PR OFFICE/OUTPT VISIT, EST, LEVL V, 40-54 MIN: ICD-10-PCS | Mod: S$GLB,,, | Performed by: PHYSICIAN ASSISTANT

## 2023-04-04 PROCEDURE — 3074F PR MOST RECENT SYSTOLIC BLOOD PRESSURE < 130 MM HG: ICD-10-PCS | Mod: CPTII,S$GLB,, | Performed by: PHYSICIAN ASSISTANT

## 2023-04-04 RX ORDER — NAPROXEN SODIUM 220 MG/1
81 TABLET, FILM COATED ORAL DAILY
COMMUNITY

## 2023-04-04 NOTE — PROGRESS NOTES
Neurosurgery  History & Physical    SUBJECTIVE:     Chief Complaint: Prior hemorrhagic CVA    History of Present Illness:  Scott Gallagher is a 32 y.o. male who presents to clinic as a referral from his PCP Dr. Gordillo to establish care after prior brain bleed.     Records for OSH not available. Per pt, had R BG stroke in May 2021 while living in [place], [place]. Presumed to be hypertensive etiology (BP was uncontrolled at the time). Reports he initially had R facial droop and LSW, states all of his deficits resolved after doing therapy.    Had recurrent episode of LUE dysfunction, as well as word finding difficulty in March 2022. Presented to ED in [place] at that time. Had a workup done with MRI, EEG, no acute findings were noted. Symptoms resolved. 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 About a week ago, had another episode of LUE dysfunction, notes specifically feeling a lack of coordination in the left hand. He did not go to the ED or seek medical care. Started doing stretches/exercises that he had learned with OT previously and this resolved after a couple of days. Does get occasional HA's but not frequent or severe. Takes ASA 81 daily. States blood pressure has been well controlled.      Review of patient's allergies indicates:   Allergen Reactions    Anti-nausea [phosphorated carbohydrate] Hives     Pt unsure of name of Rx; states 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       Current Outpatient Medications   Medication Sig Dispense Refill    amLODIPine (NORVASC) 10 MG tablet Take 1 tablet (10 mg total) by mouth once daily. 90 tablet 3    gabapentin (NEURONTIN) 300 MG capsule Take 1 capsule (300 mg total) by mouth every evening. 30 capsule 11    hydroCHLOROthiazide (HYDRODIURIL) 12.5 MG Tab Take 1 tablet (12.5 mg total) by mouth once daily. 90 tablet 3    hydroCHLOROthiazide (HYDRODIURIL) 25  MG tablet Take 1 tablet (25 mg total) by mouth once daily. 30 tablet 11    ibuprofen (ADVIL,MOTRIN) 200 MG tablet Take 200 mg by mouth every 6 (six) hours as needed for Pain.      methylPREDNISolone (MEDROL DOSEPACK) 4 mg tablet use as directed 1 each 0    olmesartan (BENICAR) 40 MG tablet Take 1 tablet (40 mg total) by mouth once daily. 90 tablet 3    olmesartan-amLODIPin-hcthiazid 40-10-12.5 mg Tab Take 1 tablet by mouth once daily. 90 tablet 3    propranoloL (INDERAL) 10 MG tablet Take 1 tablet (10 mg total) by mouth 3 (three) times daily as needed (for tremor). 90 tablet 3    sildenafiL (VIAGRA) 50 MG tablet Take 1 tablet (50 mg total) by mouth daily as needed for Erectile Dysfunction. (Patient not taking: Reported on 2/15/2023) 30 tablet 2     No current facility-administered medications for this visit.       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        OBJECTIVE:     Vital Signs     There is no height or weight on file to calculate BMI.      Neurosurgery Physical Exam  General: well developed, well nourished, no distress.   Head: normocephalic, atraumatic  Neck: No tracheal deviation. Full ROM.   Neurologic: Alert and  oriented. Thought content appropriate.  GCS: E4 V5 M6; Total: 15  Mental Status: Awake, Alert, Oriented x 4  Language: No aphasia  Speech: No dysarthria  Cranial nerves: face symmetric, tongue midline, CN II-XII grossly intact.   Eyes: pupils equal, round, reactive to light with accomodation, EOMI.   Pulmonary: normal respirations, no signs of respiratory distress  Abdomen: soft, non-distended, not tender to palpation  Vascular: Pulses 2+ and symmetric radial. No LE edema.   Skin: Skin is warm, dry and intact.    Sensory: intact to light touch throughout  Motor Strength: Moves all extremities spontaneously with good tone.  Full strength upper and lower extremities. No abnormal movements seen.     Strength  Deltoids Triceps Biceps Wrist Extension Wrist Flexion Hand    Upper: R 5/5 5/5 5/5 5/5 5/5 5/5    L 5/5 5/5 5/5 5/5 5/5 5/5     Iliopsoas Quadriceps Knee  Flexion Tibialis  anterior Gastro- cnemius EHL   Lower: R 5/5 5/5 5/5 5/5 5/5 5/5    L 5/5 5/5 5/5 5/5 5/5 5/5     Reflexes:   DTR: 2+ symmetrically throughout.  Kc's: Negative bilaterally     Cerebellar:   Finger-to-nose: intact bilaterally   Pronator drift: absent bilaterally  Gait: normal  Heel-to-shin: Intact bilaterally  Rapid alternating movements: Intact bilaterally           Diagnostic Results:  Imaging was independently reviewed by myself, along with the associated radiology report.    None recent.    MRI brain w/o contrast 2/12/2020:  - No significant intracranial abnormality      ASSESSMENT/PLAN:     Scott Gallagher is a 32 y.o. male with PMH of R BG hemorrhagic CVA in 2021, all records and follow-up at outside facility in [place], [place]. May have had TIA vs sequelae of prior CVA.     - MRI brain w/o contrast  - Referral to Vascular Neurology to establish care for stroke follow-up  - May follow up with NSGY on PRN basis      Michelle Barr PA-C  Neurosurgery  [facility]      Time spent on this encounter: 46 minutes. This  includes face to face time and non-face to face time preparing to see the patient (eg, review of tests), obtaining and/or reviewing separately obtained history, documenting clinical information in the electronic or other health record, independently interpreting results and communicating results to the patient/family/caregiver, or care coordinator.

## 2023-04-05 ENCOUNTER — TELEPHONE (OUTPATIENT)
Dept: NEUROLOGY | Facility: CLINIC | Age: 33
End: 2023-04-05
Payer: COMMERCIAL

## 2023-04-05 NOTE — TELEPHONE ENCOUNTER
----- Message from Rahul Desai MA sent at 4/4/2023  9:58 AM CDT -----  Regarding: Patient Referral  Good morning,    This patient saw a provider in Neurosurgery here today for LUE motor deficits. He does have a history of strokes so we'd like him to see someone on yall's team. He has an MRI Brain scheduled on 4/18 as well. Let me know if I can do anything on my end.    Thanks,  Oleg

## 2023-04-05 NOTE — TELEPHONE ENCOUNTER
Sounds good!     And he had a stroke March 2022 but reported all testing came back normal so I didn't think we'd need his old records. He just recently noticed dysfunction of the left arm and hand a couple weeks ago so would like to be established w a neurologist here in [place] for those symptoms.

## 2023-04-13 ENCOUNTER — PATIENT MESSAGE (OUTPATIENT)
Dept: REHABILITATION | Facility: HOSPITAL | Age: 33
End: 2023-04-13
Payer: COMMERCIAL

## 2023-04-13 ENCOUNTER — PATIENT MESSAGE (OUTPATIENT)
Dept: NEUROLOGY | Facility: CLINIC | Age: 33
End: 2023-04-13
Payer: COMMERCIAL

## 2023-04-18 ENCOUNTER — HOSPITAL ENCOUNTER (OUTPATIENT)
Dept: RADIOLOGY | Facility: HOSPITAL | Age: 33
Discharge: HOME | End: 2023-04-18
Attending: PHYSICIAN ASSISTANT
Payer: COMMERCIAL

## 2023-04-18 DIAGNOSIS — I61.0 NONTRAUMATIC SUBCORTICAL HEMORRHAGE OF RIGHT CEREBRAL HEMISPHERE: ICD-10-CM

## 2023-04-18 DIAGNOSIS — I63.89 OTHER CEREBRAL INFARCTION: ICD-10-CM

## 2023-04-18 PROCEDURE — 70551 MRI BRAIN WITHOUT CONTRAST: ICD-10-PCS | Mod: 26,,, | Performed by: RADIOLOGY

## 2023-04-18 PROCEDURE — 70551 MRI BRAIN STEM W/O DYE: CPT | Mod: TC

## 2023-04-18 PROCEDURE — 70551 MRI BRAIN STEM W/O DYE: CPT | Mod: 26,,, | Performed by: RADIOLOGY

## 2023-04-20 ENCOUNTER — CLINICAL SUPPORT (OUTPATIENT)
Dept: REHABILITATION | Facility: HOSPITAL | Age: 33
End: 2023-04-20
Attending: SURGERY
Payer: COMMERCIAL

## 2023-04-20 DIAGNOSIS — M62.89 PELVIC FLOOR DYSFUNCTION: ICD-10-CM

## 2023-04-20 DIAGNOSIS — K62.89 ANAL PAIN: ICD-10-CM

## 2023-04-20 PROCEDURE — 97162 PT EVAL MOD COMPLEX 30 MIN: CPT

## 2023-04-20 PROCEDURE — 97112 NEUROMUSCULAR REEDUCATION: CPT | Mod: 97

## 2023-04-20 NOTE — PROGRESS NOTES
[facility]  Pelvic Health Physical Therapy Initial Evaluation    Date: 4/20/2023   Name: Scott Gallagher  Clinic Number: [id number]  Therapy Diagnosis:   Encounter Diagnosis   Name Primary?    Pelvic floor dysfunction      Physician: Sun Finley MD    Physician Orders: PT Eval and Treat    Medical Diagnosis from Referral: Anal pain [K62.89]  Evaluation Date: 4/20/2023  Authorization Period Expiration: 2/24/2024  Plan of Care Expiration: 7/20/2023  Visit # / Visits authorized: 1/ 1    Time In: 1:25  Time Out: 2:30  Total Appointment Time (timed & untimed codes): 60 minutes    Precautions: universal    Subjective     Date of onset: 3 years ago    History of current condition - Scott reports: that he has occasional anal bleeding.  He is having a difficult time tolerating anal receptive intercourse.  He has had problems with bleeding with attempts at penetration- BM's are only a problem if he has attempted penetration before.  He notes that he had a traumatic colonoscopy at age 13 prior to being diagnosed with appendicitis.  (Appy after).  Also a history of bladder pain starting after treatment for back pain (muscle relaxers)    Bladder/Bowel History: bladder pain  Frequency of urination:   Daytime: 5-7 times           Nighttime: 1  Difficulty initiating urine stream: No  Urine stream: strong  Complete emptying: Yes  Bladder leakage: No  Urinary Urgency: No, Able to delay the urge for at least 30 minute(s).  Frequency of bowel movements: 2 times a day  Difficulty initiating BM: No  Quality/Shape of BM: [place] Stool Chart 5-6  Does Patient Feel Empty after BM? Yes  Fiber Supplements or Laxative Use? No  Colon leakage: No    Pain:  Location:  anal pain   Current 0/10, worst 8/10, best 0/10   Description: Aching and Burning  Aggravating Factors/Activities that cause symptoms:  sitting,; full bladder; penetration, BM after penetration    Easing Factors: rest and applying pressure     Medical History: Scott   has a past medical history of Hypertension.     Surgical History: Scott Gallagher  has a past surgical history that includes Appendectomy (2004) and [place] tooth extraction (2011).    Medications: Scott has a current medication list which includes the following prescription(s): amlodipine, aspirin, gabapentin, hydrochlorothiazide, hydrochlorothiazide, ibuprofen, olmesartan, olmesartan-amlodipin-hcthiazid, propranolol, and sildenafil.    Allergies:   Review of patient's allergies indicates:   Allergen Reactions    Anti-nausea [phosphorated carbohydrate] Hives     Pt unsure of name of Rx; states 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  Prior Level of Function: could have anal sex without pain  Current Level of Function: cannot have penetration without pain.      Types of fluid intake: water, coffee, and tea  Diet: TAD   Habitus: overweight  Abuse/Neglect: Yes was in therapy for 3 years      Pts goals: to be able to tolerate penetration without anal pain during sexual activity    OBJECTIVE     See EMR under MEDIA for written consent provided 4/20/2023  Chaperone: declined    ORTHO SCREEN  Posture in sitting: slouched   Posture in standing: forward head and forward and rounded shoulders   Pelvic alignment: no sign of deviations noted in supine     ABDOMINAL WALL ASSESSMENT  Abdominal strength: Rectus abdominus: 2/5     Transverse abdominus: poorly isolated but palpable contraction noted  Scarring: none  Diastasis: absent       BREATHING MECHANICS ASSESSMENT   Thorax Assessment During Quiet Respiration: WNL excursion of abdominal wall  Thorax Assessment During Deep Respiration: Decreased  excursion of abdominal wall       RECTAL PELVIC FLOOR EXAM    EXTERNAL ASSESSMENT  Anus: WNL  Skin condition: WNL   Scarring: none  Sensation: WNL   Pain:  some tenderness with PA pressure to the coccyx externally  Voluntary contraction: visible lift  Voluntary relaxation: visible drop  Involuntary contraction: visible lift  Bearing down: bulge  Anal [place]: intact  Discharge: none       INTERNAL ASSESSMENT  EAS tone: WNL   Impaction: none   Pain: tender areas noted as follows: B levator ani and anococcygeal raphe  Sensation: able to localize pressure appropriately   Muscle Bulk: hypertonus   Muscle Power: 2/5       Quality of contraction: slow relaxation   Specificity: patient contracts: gluts  Coordination: tends to hold breath during PFM contraction     TREATMENT     Treatment Time In: 2:20  Treatment Time Out: 2:30  Total Treatment time (time-based codes) separate from Evaluation: 10 minutes    Neuromuscular Re-education to develop Down training for 10 minutes including:   diaphragmatic breathing and yoga postures for drop    Patient Education provided:   general anatomy/physiology of urinary/ bowel  system, benefits of treatment, risks of treatment, and alternative methods of treatment were discussed with the pt. Additionally, anatomy/physiology of pelvic floor and posture/body mechanices were reviewed.     Home Exercises provided:  Written Home Exercises provided: yes.  Exercises were reviewed and Scott was able to demonstrate them prior to the end of the session.    Scott demonstrated good  understanding of the education provided.     See EMR under Patient Instructions for exercises provided 4/20/2023.    Assessment     Scott is a 32 y.o. male referred to outpatient Physical Therapy with a medical diagnosis of Anal pain [K62.89]. Pt presents with poor knowledge of body mechanics and posture, pelvic floor tenderness, decreased pelvic muscle strength, increased tension of the pelvic muscles, and poor quality of  pelvic muscle contraction.       Pt prognosis is Excellent.   Pt will benefit from skilled outpatient Physical Therapy to address the deficits stated above and in the chart below, provide pt/family education, and to maximize pt's level of independence.     Plan of care discussed with patient: Yes  Pt's spiritual, cultural and educational needs considered and patient is agreeable to the plan of care and goals as stated below:     Anticipated Barriers for therapy: none    Medical Necessity is demonstrated by the following:    History  Co-morbidities and personal factors that may impact the plan of care Co-morbidities   high BMI    Personal Factors  no deficits     moderate   Examination  Body structures and functions, activity limitations and participation restrictions that may impact the plan of care Body Regions/Systems/Functions:  poor knowledge of body mechanics and posture, pelvic floor tenderness, decreased pelvic muscle strength, increased tension of the pelvic muscles, and poor quality of pelvic muscle contraction     Activity Limitations:  Pain with ADLs    Participation Restrictions:  ADL participation affected by pain    Activity limitations:   Learning and applying knowledge  no deficits    General Tasks and Commands  no deficits    Communication  no deficits    Mobility  no deficits    Self care  no deficits    Domestic Life  no deficits    Interactions/Relationships  no deficits    Life Areas  no deficits    Community and Social Life  no deficits       moderate   Clinical Presentation evolving clinical presentation with changing clinical characteristics moderate   Decision Making/ Complexity Score: moderate       Goals:  Long Term Goals: 12 weeks   Pt will verbalize improved awareness of PFM activity as palpated by PT in order to improve activity involvement with HEP.  Pt will report successfully having intercourse with < or = 2/10 pain for an improvement in activity tolerance.   Pt will report < or =  2/10 pain with urination/defecation 80% of the time.   Pt/family will be independent with HEP for continued self-management of symptoms.     Plan     Plan of care Certification: 4/20/2023 to 7/20/2023.    Outpatient Physical Therapy 1 times per 2 week(s)  for 12 weeks to include the following interventions: therapeutic exercises, therapeutic activity, neuromuscular re-education, manual therapy, modalities PRN, patient/family education, and self care/home management    Judy Perez, PT, BCB-PMD

## 2023-04-20 NOTE — PATIENT INSTRUCTIONS
"Home Exercise Program: 04/20/2023    360 Breathing Technique          Inhale long, slow and deep. You should feel as if your lower ribs are expanding in all directions like the way an umbrella opens. You should feel the belly, back and sides gently expand and you may notice a relaxation in the pelvic floor. Then exhale and note the pelvic floor and abdominals "snapping back" together.     Continue to breathe like this for 5 minutes. Repeat several times/day (or as needed for pain).      Home Exercise Program: 04/20/2023    YOGA POSES   to improve pelvic floor muscle DROP.  Maintain each position for 1 minute, 1-2 times per day    Happy Baby            Child's Pose   "

## 2023-04-26 ENCOUNTER — PATIENT MESSAGE (OUTPATIENT)
Dept: ORTHOPEDICS | Facility: CLINIC | Age: 33
End: 2023-04-26
Payer: COMMERCIAL

## 2023-04-26 ENCOUNTER — OFFICE VISIT (OUTPATIENT)
Dept: NEUROLOGY | Facility: CLINIC | Age: 33
End: 2023-04-26
Payer: COMMERCIAL

## 2023-04-26 VITALS
HEART RATE: 69 BPM | WEIGHT: 315 LBS | BODY MASS INDEX: 38.36 KG/M2 | HEIGHT: 76 IN | DIASTOLIC BLOOD PRESSURE: 79 MMHG | SYSTOLIC BLOOD PRESSURE: 126 MMHG

## 2023-04-26 DIAGNOSIS — I10 PRIMARY HYPERTENSION: ICD-10-CM

## 2023-04-26 DIAGNOSIS — I63.89 OTHER CEREBRAL INFARCTION: ICD-10-CM

## 2023-04-26 DIAGNOSIS — H53.9 VISUAL DISTURBANCE: ICD-10-CM

## 2023-04-26 DIAGNOSIS — I61.0 NONTRAUMATIC SUBCORTICAL HEMORRHAGE OF RIGHT CEREBRAL HEMISPHERE: Primary | ICD-10-CM

## 2023-04-26 DIAGNOSIS — I61.8 OTHER RIGHT-SIDED NONTRAUMATIC INTRACEREBRAL HEMORRHAGE: ICD-10-CM

## 2023-04-26 DIAGNOSIS — R20.2 LEFT HAND PARESTHESIA: ICD-10-CM

## 2023-04-26 PROCEDURE — 99214 PR OFFICE/OUTPT VISIT, EST, LEVL IV, 30-39 MIN: ICD-10-PCS | Mod: S$GLB,,, | Performed by: NURSE PRACTITIONER

## 2023-04-26 PROCEDURE — 3074F SYST BP LT 130 MM HG: CPT | Mod: CPTII,S$GLB,, | Performed by: NURSE PRACTITIONER

## 2023-04-26 PROCEDURE — 4010F ACE/ARB THERAPY RXD/TAKEN: CPT | Mod: CPTII,S$GLB,, | Performed by: NURSE PRACTITIONER

## 2023-04-26 PROCEDURE — 3074F PR MOST RECENT SYSTOLIC BLOOD PRESSURE < 130 MM HG: ICD-10-PCS | Mod: CPTII,S$GLB,, | Performed by: NURSE PRACTITIONER

## 2023-04-26 PROCEDURE — 1159F MED LIST DOCD IN RCRD: CPT | Mod: CPTII,S$GLB,, | Performed by: NURSE PRACTITIONER

## 2023-04-26 PROCEDURE — 3078F DIAST BP <80 MM HG: CPT | Mod: CPTII,S$GLB,, | Performed by: NURSE PRACTITIONER

## 2023-04-26 PROCEDURE — 3044F HG A1C LEVEL LT 7.0%: CPT | Mod: CPTII,S$GLB,, | Performed by: NURSE PRACTITIONER

## 2023-04-26 PROCEDURE — 4010F PR ACE/ARB THEARPY RXD/TAKEN: ICD-10-PCS | Mod: CPTII,S$GLB,, | Performed by: NURSE PRACTITIONER

## 2023-04-26 PROCEDURE — 3078F PR MOST RECENT DIASTOLIC BLOOD PRESSURE < 80 MM HG: ICD-10-PCS | Mod: CPTII,S$GLB,, | Performed by: NURSE PRACTITIONER

## 2023-04-26 PROCEDURE — 3008F BODY MASS INDEX DOCD: CPT | Mod: CPTII,S$GLB,, | Performed by: NURSE PRACTITIONER

## 2023-04-26 PROCEDURE — 99999 PR PBB SHADOW E&M-EST. PATIENT-LVL IV: ICD-10-PCS | Mod: PBBFAC,,, | Performed by: NURSE PRACTITIONER

## 2023-04-26 PROCEDURE — 3044F PR MOST RECENT HEMOGLOBIN A1C LEVEL <7.0%: ICD-10-PCS | Mod: CPTII,S$GLB,, | Performed by: NURSE PRACTITIONER

## 2023-04-26 PROCEDURE — 3008F PR BODY MASS INDEX (BMI) DOCUMENTED: ICD-10-PCS | Mod: CPTII,S$GLB,, | Performed by: NURSE PRACTITIONER

## 2023-04-26 PROCEDURE — 1160F RVW MEDS BY RX/DR IN RCRD: CPT | Mod: CPTII,S$GLB,, | Performed by: NURSE PRACTITIONER

## 2023-04-26 PROCEDURE — 1160F PR REVIEW ALL MEDS BY PRESCRIBER/CLIN PHARMACIST DOCUMENTED: ICD-10-PCS | Mod: CPTII,S$GLB,, | Performed by: NURSE PRACTITIONER

## 2023-04-26 PROCEDURE — 99214 OFFICE O/P EST MOD 30 MIN: CPT | Mod: S$GLB,,, | Performed by: NURSE PRACTITIONER

## 2023-04-26 PROCEDURE — 1159F PR MEDICATION LIST DOCUMENTED IN MEDICAL RECORD: ICD-10-PCS | Mod: CPTII,S$GLB,, | Performed by: NURSE PRACTITIONER

## 2023-04-26 PROCEDURE — 99999 PR PBB SHADOW E&M-EST. PATIENT-LVL IV: CPT | Mod: PBBFAC,,, | Performed by: NURSE PRACTITIONER

## 2023-04-26 NOTE — PROGRESS NOTES
[facility] VISIT      SUBJECTIVE:    History for Present Illness: Scott Gallagher is a 32 y.o.  male past medical history of right nontraumatic ICH (OSH May 2021 etiology presumed to be 2/2 uncontrolled HTN; patient reports during the same hospitalization carotid dissection was noted) and HTN who presents to me in clinic today to establish care.  Patient is accompanied by his fiancee.    At the time of today's visit, the patient denies new or worsening focal neurologic symptoms concerning for new stroke or TIA.  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  First incident was in 2022 most recent incident was March 2023.  Patient's partner reports word-finding difficulty was mild and nondetectable to outsiders.  Symptoms self-resolved without any recurrence.  Patient reports an aura and right visual field >1 year.  Currently, He denies associated HA ,vertigo, double vision, focal weakness or numbness, gait imbalance,  or language difficulty.  He is independent with ADLs.  Patient denies alcohol/tobacco/illicit drug use.  Patient is compliant with home medications including HTN.      Past Medical History:   Diagnosis Date    Hypertension      Past Surgical History:   Procedure Laterality Date    APPENDECTOMY  2004    WISDOM TOOTH EXTRACTION  2011     Family History   Problem Relation Age of Onset    Hypertension Mother     Diabetes type II Mother     Hypertension Father     Diabetes type II Father     Hypertension Sister 28    Hypertension Brother     Hypertension Brother     Hypertension Brother     Hypertension Sister         Current Outpatient Medications:     amLODIPine (NORVASC) 10 MG tablet, Take 1 tablet (10 mg total) by mouth once daily., Disp: 90 tablet, Rfl: 3    aspirin 81 MG Chew, Take 81 mg by mouth once daily., Disp: , Rfl:     gabapentin  "(NEURONTIN) 300 MG capsule, Take 1 capsule (300 mg total) by mouth every evening., Disp: 30 capsule, Rfl: 11    hydroCHLOROthiazide (HYDRODIURIL) 25 MG tablet, Take 1 tablet (25 mg total) by mouth once daily., Disp: 30 tablet, Rfl: 11    ibuprofen (ADVIL,MOTRIN) 200 MG tablet, Take 200 mg by mouth every 6 (six) hours as needed for Pain., Disp: , Rfl:     olmesartan (BENICAR) 40 MG tablet, Take 1 tablet (40 mg total) by mouth once daily., Disp: 90 tablet, Rfl: 3    olmesartan-amLODIPin-hcthiazid 40-10-12.5 mg Tab, Take 1 tablet by mouth once daily., Disp: 90 tablet, Rfl: 3    propranoloL (INDERAL) 10 MG tablet, Take 1 tablet (10 mg total) by mouth 3 (three) times daily as needed (for tremor)., Disp: 90 tablet, Rfl: 3    hydroCHLOROthiazide (HYDRODIURIL) 12.5 MG Tab, Take 1 tablet (12.5 mg total) by mouth once daily., Disp: 90 tablet, Rfl: 3    sildenafiL (VIAGRA) 50 MG tablet, Take 1 tablet (50 mg total) by mouth daily as needed for Erectile Dysfunction. (Patient not taking: Reported on 2/15/2023), Disp: 30 tablet, Rfl: 2     Review of Systems:   Constitutional:  Negative for chills and fever.   HENT:  Negative for sore throat.    Eyes:  Positive visual disturbance right. Negative for photophobia, pain and redness.   Respiratory:  Negative for shortness of breath.    Cardiovascular:  Negative for chest pain.   Gastrointestinal:  Negative for nausea.   Genitourinary:  Negative for dysuria.   Musculoskeletal:  Negative for back pain.   Skin:  Negative for rash.   Neurological:  Negative for weakness.   Hematological:  Does not bruise/bleed easily.     OBJECTIVE:    /79   Pulse 69   Ht 6' 4" (1.93 m)   Wt (!) 145.5 kg (320 lb 12.3 oz)   BMI 39.05 kg/m²     Physical Exam   Constitution: He appears well nourished. No distress   LOC: Alert and follows request  Head: Normocephalic and atraumatic.   Cardiovascular: Normal rate. Intact distal pulses  Pulmonary/Chest: Effort normal. No respiratory " distress  Psychiatric: no pressured speech; normal affect; no evidence of impaired cognition    Neurologic Exam:  Orientation: person, place and time  Language: No aphasia  Speech: No dysarthria  Memory: Recent memory intact; Remote memory intact; age correct; month correct  Visual Fields (CN II):  Full  EOM (CN III, IV, VI): Full intact  Pupils (CN II, III): PERRL  Facial Sensation (CN V): symmetric  Facial Movement (CN VII): Symmetrical facial expressions   Hearing (CN VIII):  Intact bilaterally   Shoulder/Neck (CN XI): SCM-Left: Normal; SCM-Right: Normal; Left Shoulder Shrug: Normal/Symmetric ; Right Shoulder Shrug: Normal/Symmetric  Tongue (CN XII): to midline  Motor examination of all extremities :demonstrates normal bulk and tone in all four limbs. There are no atrophy or fasciculations.        Left Right     Left Right   Deltoid 5/5 5/5   Hip Flexion 5/5 5/5   Biceps 5/5 5/5   Hip Extension 5/5 5/5   Triceps 5/5 5/5   Knee Flexion 5/5 5/5   Wrist Ext 5/5 5/5   Knee Extension 5/5 5/5   Finger Abd 5/5 5/5   Ankle dorsiflex 5/5 5/5           Ankle plantar flex 5/5 5/5     Sensory examination: is normal light touch in BUE and BLE with the exception of the 2nd and 3rd digit on the left hand which has decreased sensation to light touch.    Gait: Gait steady with normal arm swing and stride length  Coordination: No dysmetria with finger-to-nose . Rapid alternating movements and fine finger movements are intact.        Relevant Labwork:  Recent Labs   Lab 02/17/23  0945   Hemoglobin A1C 5.5   LDL Cholesterol 145.4   HDL 30 L   Triglycerides 318 H   Cholesterol 239 H       Diagnostic Results:  Imaging was independently reviewed by myself, along with the associated radiology report    Brain Imaging   MRI of the brain 04/18/2023:  Remote right basal ganglia hemorrhage.    No acute intracranial abnormalities  Vessel Imaging     Cardiac Imaging     Assessment:  Scott Gallagher  is a 32 y.o.  male  seen today in clinic for  follow-up assessment and recommendations. The following recommendations and plan were discussed in depth with the patient who voiced understanding and was in agreement.  Plan:  Nontraumatic right ICH  -Stroke etiology suspected HTN vs vascular abnormality  --In-depth discussion with patient regarding diagnosis ,imaging findings  & stroke risk factors  -No current clinical indication for anticoagulation at this time.   -continue aggressive risk factor modification and maximum medical management  -- Antithrombotics/ Anticoagulation:  Not clinically indicated for ICH  - Stroke Risk Factors:  HTN  - Lipid Management:  Not clinically indicated for ICH  -Hypertension: Long term goal is normotension w/ target BP of less than 130/80 mmHg.  Continue home medications and follow up with PCP for surveillance and long-term management  - Diagnostics ordered/pending: Given Hx of right ICH ,recurrence of symptoms and possible Hx of carotid dissection will obtain CTA head and neck to access cerebral vasculature  -Diet: Discussed Mediterranean Diet recommendations (Adopted from Abe et al, NEJ, 2018.)  - Eat primarily plant-based foods, such as fruits and vegetables, whole grains, legumes (beans) and nuts  - Limit refined carbohydrates (white pasta, bread, rice).  - Replace butter with healthy fats such as olive oil.  - Use herbs and spices instead of salt to flavor foods.  - Limit red meat and processed meats to no more than a few times a month.  - Avoid sugary sodas, bakery goods, and sweets.  - Eat fish and poultry at least twice a week.              - Get plenty of exercise (150 minutes per week).    Paraesthesia 2nd and 3rd digit left hand  Possible recrudescence of sx vs medial nerve injury  Will obtain EMG to access for nerve conduction abnormalities    Visual disturbance  Ambulatory referral to Neuro opthalmology for further assessment and recommendations    Patient/Family teaching provided during this visit  -Identifying  the signs and symptoms of stroke; emergency action and ER attention  -Risk factor control  -Optimization for secondary stroke prevention including compliance with current medications       I will plan on having Peter return to clinic as needed. The patient can contact my office with any questions or concerns they may have as they arise in the interim.     45 minutes of total time spent on the encounter, which includes face to face time and non-face to face time preparing to see the patient (eg, review of tests), Obtaining and/or reviewing separately obtained history, Documenting clinical information in the electronic or other health record, Independently interpreting results (not separately reported) and communicating results to the patient/family/caregiver, patient/family education and Care coordination (not separately reported).     Celena Overton, NP-C  Department of Vascular Neurology  [facility]  [phone]    This note is dictated on M*Modal Fluency Direct word recognition program. There are word recognition mistakes that are occasionally missed on review

## 2023-05-03 ENCOUNTER — HOSPITAL ENCOUNTER (OUTPATIENT)
Dept: RADIOLOGY | Facility: HOSPITAL | Age: 33
Discharge: HOME | End: 2023-05-03
Attending: NURSE PRACTITIONER
Payer: COMMERCIAL

## 2023-05-03 DIAGNOSIS — I61.8 OTHER RIGHT-SIDED NONTRAUMATIC INTRACEREBRAL HEMORRHAGE: ICD-10-CM

## 2023-05-03 PROCEDURE — 70498 CTA HEAD AND NECK (XPD): ICD-10-PCS | Mod: 26,,, | Performed by: RADIOLOGY

## 2023-05-03 PROCEDURE — 70496 CTA HEAD AND NECK (XPD): ICD-10-PCS | Mod: 26,,, | Performed by: RADIOLOGY

## 2023-05-03 PROCEDURE — 70498 CT ANGIOGRAPHY NECK: CPT | Mod: 26,,, | Performed by: RADIOLOGY

## 2023-05-03 PROCEDURE — 70496 CT ANGIOGRAPHY HEAD: CPT | Mod: TC

## 2023-05-03 PROCEDURE — 70496 CT ANGIOGRAPHY HEAD: CPT | Mod: 26,,, | Performed by: RADIOLOGY

## 2023-05-03 PROCEDURE — 25500020 PHARM REV CODE 255: Performed by: NURSE PRACTITIONER

## 2023-05-03 RX ADMIN — IOHEXOL 100 ML: 350 INJECTION, SOLUTION INTRAVENOUS at 05:05

## 2023-05-05 ENCOUNTER — IMMUNIZATION (OUTPATIENT)
Dept: INTERNAL MEDICINE | Facility: CLINIC | Age: 33
End: 2023-05-05
Payer: COMMERCIAL

## 2023-05-05 DIAGNOSIS — Z23 NEED FOR VACCINATION: Primary | ICD-10-CM

## 2023-05-05 PROCEDURE — 91312 COVID-19, MRNA, LNP-S, BIVALENT BOOSTER, PF, 30 MCG/0.3 ML DOSE: ICD-10-PCS | Mod: S$GLB,,, | Performed by: INTERNAL MEDICINE

## 2023-05-05 PROCEDURE — 91312 COVID-19, MRNA, LNP-S, BIVALENT BOOSTER, PF, 30 MCG/0.3 ML DOSE: CPT | Mod: S$GLB,,, | Performed by: INTERNAL MEDICINE

## 2023-05-05 PROCEDURE — 0124A COVID-19, MRNA, LNP-S, BIVALENT BOOSTER, PF, 30 MCG/0.3 ML DOSE: CPT | Mod: CV19,PBBFAC | Performed by: INTERNAL MEDICINE

## 2023-05-09 ENCOUNTER — CLINICAL SUPPORT (OUTPATIENT)
Dept: REHABILITATION | Facility: HOSPITAL | Age: 33
End: 2023-05-09
Attending: SURGERY
Payer: COMMERCIAL

## 2023-05-09 DIAGNOSIS — M62.89 PELVIC FLOOR DYSFUNCTION: Primary | ICD-10-CM

## 2023-05-09 PROCEDURE — 97112 NEUROMUSCULAR REEDUCATION: CPT

## 2023-05-09 NOTE — PROGRESS NOTES
Pelvic Health Physical Therapy   Treatment Note     Name: Scott Gallagher  Clinic Number: [id number]    Therapy Diagnosis:   Encounter Diagnosis   Name Primary?    Pelvic floor dysfunction Yes     Physician: Sun Finley MD    Visit Date: 5/9/2023    Physician Orders: PT Eval and Treat    Medical Diagnosis from Referral: Anal pain [K62.89]  Evaluation Date: 4/20/2023  Authorization Period Expiration: 7/9/2023  Plan of Care Expiration: 7/20/2023  Visit # / Visits authorized: 2/12  Cancelled Visits: 0  No Show Visits: 0    Time In: 1:32  Time Out: 2:30  Total Billable Time: 55 minutes    Precautions: Standard    Subjective     Pt reports: that he has been doing the breathing exercises more than the yoga.    He was compliant with home exercise program.  Response to previous treatment: no adverse effects  Functional change: increased muscle awareness per pt report.      Pain: 0/10    Objective     Scott participated in neuromuscular re-education activities to develop Coordination and Down training for 55 minutes including: Pelvic floor downtraining with assist of sEMG  We worked in supine and SL DKTC with external lead wires.  He demonstrated normal baseline resting, good WR rise, and good holding in 10 sec Kegels.  Derecruitment was complete and timely.  We then worked on pelvic muscle downtraining with introduction of the exam finger rectally (with pt's verbal consent).  He was able to reduce his muscle activity for short periods, using DB.  We discussed further downtraining at home with use of an anal dilator set (this was reviewed on the [facility] medical website) and he agreed with this plan- he was instructed to bring in a dilator set next time (see pt instructions) for instruction.  Session concluded with addition of seated child's pose with DB for further PFM downtraining.      Home Exercises Provided and Patient Education Provided     Education provided:   - posture/body mechanices  Discussed progression of plan of  care with patient; educated pt in activity modification; reviewed HEP with pt. Pt demonstrated and verbalized understanding of all instruction and was provided with a handout of HEP (see Patient Instructions).    Written Home Exercises Provided: yes.  Exercises were reviewed and Scott was able to demonstrate them prior to the end of the session.  Scott demonstrated good  understanding of the education provided.     See EMR under Patient Instructions for exercises provided 5/9/2023.    Assessment     Pt tolerated internal work fairly well- will benefit from working with a dilator set at home.    Scott Is progressing well towards his goals.   Pt prognosis is Good.     Pt will continue to benefit from skilled outpatient physical therapy to address the deficits listed in the problem list box on initial evaluation, provide pt/family education and to maximize pt's level of independence in the home and community environment.     Pt's spiritual, cultural and educational needs considered and pt agreeable to plan of care and goals.     Anticipated barriers to physical therapy: none    Goals:  12 weeks   Pt will verbalize improved awareness of PFM activity as palpated by PT in order to improve activity involvement with HEP.  Pt will report successfully having intercourse with < or = 2/10 pain for an improvement in activity tolerance.   Pt will report < or = 2/10 pain with urination/defecation 80% of the time.   Pt/family will be independent with HEP for continued self-management of symptoms.   ALL PROGRESSING    Plan     Plan of care Certification: 4/20/2023 to 7/20/2023.     Outpatient Physical Therapy 1 times per 2 week(s)  for 12 weeks to include the following interventions: therapeutic exercises, therapeutic activity, neuromuscular re-education, manual therapy, modalities PRN, patient/family education, and self care/home management    Judy Perez, PT, BCB-PMD

## 2023-05-09 NOTE — PATIENT INSTRUCTIONS
Continue Happy Baby and Child's pose.      Add seated child's pose (at desk)    [email or web address]

## 2023-05-23 ENCOUNTER — PATIENT MESSAGE (OUTPATIENT)
Dept: REHABILITATION | Facility: HOSPITAL | Age: 33
End: 2023-05-23
Payer: COMMERCIAL

## 2023-05-23 ENCOUNTER — HOSPITAL ENCOUNTER (EMERGENCY)
Facility: HOSPITAL | Age: 33
Discharge: HOME | End: 2023-05-23
Attending: EMERGENCY MEDICINE
Payer: COMMERCIAL

## 2023-05-23 ENCOUNTER — PATIENT MESSAGE (OUTPATIENT)
Dept: ORTHOPEDICS | Facility: CLINIC | Age: 33
End: 2023-05-23
Payer: COMMERCIAL

## 2023-05-23 VITALS
SYSTOLIC BLOOD PRESSURE: 125 MMHG | TEMPERATURE: 98 F | OXYGEN SATURATION: 99 % | DIASTOLIC BLOOD PRESSURE: 82 MMHG | BODY MASS INDEX: 38.36 KG/M2 | HEIGHT: 76 IN | RESPIRATION RATE: 16 BRPM | WEIGHT: 315 LBS | HEART RATE: 82 BPM
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
## 2023-05-23 DIAGNOSIS — M54.50 CHRONIC BILATERAL LOW BACK PAIN WITHOUT SCIATICA: ICD-10-CM

## 2023-05-23 DIAGNOSIS — T14.8XXA CONTUSION OF BONE: ICD-10-CM

## 2023-05-23 DIAGNOSIS — G89.29 CHRONIC BILATERAL LOW BACK PAIN WITHOUT SCIATICA: ICD-10-CM

## 2023-05-23 PROCEDURE — 25000003 PHARM REV CODE 250: Performed by: PHYSICIAN ASSISTANT

## 2023-05-23 PROCEDURE — 99284 EMERGENCY DEPT VISIT MOD MDM: CPT | Mod: ,,, | Performed by: PHYSICIAN ASSISTANT

## 2023-05-23 PROCEDURE — 96372 THER/PROPH/DIAG INJ SC/IM: CPT | Performed by: PHYSICIAN ASSISTANT

## 2023-05-23 PROCEDURE — 99284 PR EMERGENCY DEPT VISIT,LEVEL IV: ICD-10-PCS | Mod: ,,, | Performed by: PHYSICIAN ASSISTANT

## 2023-05-23 PROCEDURE — 63600175 PHARM REV CODE 636 W HCPCS: Performed by: PHYSICIAN ASSISTANT

## 2023-05-23 PROCEDURE — 99284 EMERGENCY DEPT VISIT MOD MDM: CPT | Mod: 25

## 2023-05-23 RX ORDER — METHOCARBAMOL 750 MG/1
1500 TABLET, FILM COATED ORAL
Status: COMPLETED | OUTPATIENT
Start: 2023-05-23 | End: 2023-05-23

## 2023-05-23 RX ORDER — LIDOCAINE 50 MG/G
1 PATCH TOPICAL
Status: DISCONTINUED | OUTPATIENT
Start: 2023-05-23 | End: 2023-05-23 | Stop reason: HOSPADM

## 2023-05-23 RX ORDER — METHOCARBAMOL 750 MG/1
1500 TABLET, FILM COATED ORAL EVERY 8 HOURS PRN
Qty: 16 TABLET | Refills: 0 | Status: SHIPPED | OUTPATIENT
Start: 2023-05-23 | End: 2024-02-07

## 2023-05-23 RX ORDER — DEXAMETHASONE SODIUM PHOSPHATE 4 MG/ML
8 INJECTION, SOLUTION INTRA-ARTICULAR; INTRALESIONAL; INTRAMUSCULAR; INTRAVENOUS; SOFT TISSUE
Status: COMPLETED | OUTPATIENT
Start: 2023-05-23 | End: 2023-05-23

## 2023-05-23 RX ORDER — LIDOCAINE 50 MG/G
1 PATCH TOPICAL DAILY
Qty: 12 PATCH | Refills: 0 | Status: SHIPPED | OUTPATIENT
Start: 2023-05-23 | End: 2024-02-07

## 2023-05-23 RX ADMIN — LIDOCAINE 1 PATCH: 50 PATCH TOPICAL at 05:05

## 2023-05-23 RX ADMIN — METHOCARBAMOL 1500 MG: 750 TABLET ORAL at 05:05

## 2023-05-23 RX ADMIN — DEXAMETHASONE SODIUM PHOSPHATE 8 MG: 4 INJECTION INTRA-ARTICULAR; INTRALESIONAL; INTRAMUSCULAR; INTRAVENOUS; SOFT TISSUE at 05:05

## 2023-05-23 NOTE — ED PROVIDER NOTES
"Encounter Date: 5/23/2023       History     Chief Complaint   Patient presents with    Back Injury     States fell out of office chair, struck tailbone on floor- now lower back pain     5:23 PM  Scott Gallagher is a 32 y.o.  male past medical history of right nontraumatic ICH (OSH May 2021 etiology presumed to be 2/2 uncontrolled HTN, back pain who presents to [facility] ED for emergent evaluation of worsening back pain status post fall.    Patient states that he sat in a chair with a missing leg and "went straight to ground" around 12:30 today.  He landed on his buttock and low back.  No head trauma or LOC. since, he has had an increase in his pain bilaterally, more on the left.  Currently his pain is 10/10.  His pain is worse with moving such as getting out of bed.  Nothing improves his pain.  He takes gabapentin nightly.  He had Tylenol 1 g at 13:00 without resolution.  He avoids NSAIDs due to history of nontraumatic intracranial hemorrhage.  He has not had lower extremity weakness, lower extremity paresthesias, saddle anesthesias, bowel incontinence, bladder incontinence.      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     Past Medical History:   Diagnosis Date    Hypertension      Past Surgical History:   Procedure Laterality Date    APPENDECTOMY  2004    WISDOM TOOTH EXTRACTION  2011     Family History   Problem Relation Age of Onset    Hypertension Mother     Diabetes type II Mother     Hypertension Father     Diabetes type II Father     Hypertension Sister 28    Hypertension Brother     Hypertension Brother     Hypertension Brother     Hypertension Sister      Social History     Tobacco Use    Smoking status: Never    Smokeless tobacco: Never   Substance Use Topics    Alcohol use: Yes    " Drug use: Yes     Types: Marijuana     Comment: very rarely used, edible     Review of Systems   Constitutional:  Negative for fever.   HENT:  Negative for sore throat.    Respiratory:  Negative for shortness of breath.    Cardiovascular:  Negative for chest pain.   Gastrointestinal:  Negative for constipation, diarrhea (no bowel incontinence), nausea and vomiting.   Genitourinary:  Negative for difficulty urinating (no bladder retention) and dysuria.   Musculoskeletal:  Positive for back pain.   Skin:  Negative for rash.   Neurological:  Negative for weakness.   Hematological:  Does not bruise/bleed easily.     Physical Exam     Initial Vitals [05/23/23 1521]   BP Pulse Resp Temp SpO2   (!) 141/67 97 15 98.2 °F (36.8 °C) 98 %      MAP       --         Physical Exam    Vitals reviewed.  Constitutional: He appears well-developed and well-nourished. He is not diaphoretic. He is cooperative.  Non-toxic appearance. He does not have a sickly appearance. He does not appear ill. No distress.   HENT:   Head: Normocephalic and atraumatic.   Nose: Nose normal.   Mouth/Throat: No trismus in the jaw.   Eyes: Conjunctivae and EOM are normal.   Neck:   Normal range of motion.  Pulmonary/Chest: No accessory muscle usage. No tachypnea. No respiratory distress.   Abdominal: He exhibits no distension.   Musculoskeletal:         General: Normal range of motion.      Cervical back: Normal range of motion. No bony tenderness. Normal range of motion.      Thoracic back: No bony tenderness. Normal range of motion.      Lumbar back: Tenderness present. No swelling, edema, deformity, signs of trauma, lacerations, spasms or bony tenderness. Normal range of motion. Negative right straight leg raise test and negative left straight leg raise test.        Back:       Comments: FROM of marie upper and lower extremities with 5/5 strength.   Sensations grossly intact.   Able to bear weight and ambulate without assistance.      Neurological: He is  alert. He has normal strength.   Skin: Skin is dry. No pallor.       ED Course   Procedures  Labs Reviewed - No data to display       Imaging Results              X-Ray Lumbar Spine Ap And Lateral (Final result)  Result time 05/23/23 19:17:13      Final result by Cordell Gunter MD (05/23/23 19:17:13)                   Impression:      1. No acute displaced fracture or dislocation of the lumbar spine.      Electronically signed by: Cordell Gunter MD  Date:    05/23/2023  Time:    19:17               Narrative:    EXAMINATION:  XR LUMBAR SPINE AP AND LATERAL    CLINICAL HISTORY:  fall;    TECHNIQUE:  AP, lateral and spot images were performed of the lumbar spine.    COMPARISON:  02/27/2023    FINDINGS:  Three views lumbar spine.    Lateral imaging demonstrates adequate alignment of the lumbar spine without significant vertebral body height loss or disc space height loss.  The facet joints are aligned.  AP spinal alignment is remarkable for levo scoliotic curvature.  The bilateral sacroiliac joints are intact.                                       Medications   LIDOcaine 5 % patch 1 patch (1 patch Transdermal Patch Applied 5/23/23 1758)   methocarbamoL tablet 1,500 mg (1,500 mg Oral Given 5/23/23 1756)   dexAMETHasone injection 8 mg (8 mg Intramuscular Given 5/23/23 1757)     Medical Decision Making:   History:   Old Medical Records: I decided to obtain old medical records.  Old Records Summarized: records from previous admission(s) and records from clinic visits.  Initial Assessment:   Scott Gallagher is a 32 y.o.  male past medical history of right nontraumatic ICH (OSH May 2021 etiology presumed to be 2/2 uncontrolled HTN, back pain who presents to [facility] ED for emergent evaluation of worsening back pain status post fall.  Differential Diagnosis:   Includes but is not limited to contusion, strain, sprain, DDD, DJD, fractures and dislocations although no bony tenderness or step-offs.  He has no red flag symptoms.   Normal physical exam.  Doubt spinal cord compression or cauda equina syndrome.  Clinical Tests:   Radiological Study: Reviewed and Ordered  ED Management:  Will treat conservatively.  Will avoid NSAIDs given history of hemorrhagic stroke.  Will give intramuscular steroids, Robaxin, and lidocaine patch.  Will obtain x-ray.          Attending Attestation:     Physician Attestation Statement for NP/PA:       Other NP/PA Attestation Additions:      Medical Decision Making: I have reviewed the GALDINO's care of this patient via chart review. I was also physically present in the ED for questions when the GALDINO evaluated this patient. He is a patient with acute on chronic lower back pain, complicated by a fall onto his tailbone/lower back. It appears that he has no new neurologic findings and reassuring plain films of the lower back. From the information I have been given, it appears that he was managed well here in the ED with supportive care measures for his back and referral to ortho for close follow up. It does not appear that he needed further emergent imaging of the spine here in the ED given his intact neuro exam nor did he need imaging of the head (sounds like he had no impact to the head).   FÉLIX Silverio MD           ED Course as of 05/23/23 2027   Tue May 23, 2023   1720 BP(!): 141/67 [CL]   1720 Temp: 98.2 °F (36.8 °C) [CL]   1721 Pulse: 97 [CL]   1721 Resp: 15 [CL]   1721 SpO2: 98 % [CL]   1920 X-Ray Lumbar Spine Ap And Lateral  No acute displaced fracture or dislocation of the lumbar spine. [CL]      ED Course User Index  [CL] Aislinn Whitehead PA-C          Patient reassessed.  He reports improvement in his pain is 8/10.  He already saw his results on my chart.  He does not need any further labs or advanced imaging at this time in the emergency department.  Recommend following up closely with his orthopedics in 1 week if his symptoms do not improve.  Prescription sent to pharmacy of choice.  We discussed use.  Avoid  prolonged bed rest.  Avoid activity that exacerbates pain.  All questions answered.  Patient and his fiancee are comfortable with plan, and patient is stable for discharge.     I have reviewed patient's chart and discussed this case with my supervising MD.     Aislinn Whitehead PA-C  Emergent Department  [facility]  Spectralink #[id number] or #[phone]    Clinical Impression:   Final diagnoses:  [W19.XXXA] Fall, initial encounter (Primary)  [T14.8XXA] Contusion of bone  [M54.50, G89.29] Chronic bilateral low back pain without sciatica        ED Disposition Condition    Discharge Stable          ED Prescriptions       Medication Sig Dispense Start Date End Date Auth. Provider    LIDOcaine (LIDODERM) 5 % Place 1 patch onto the skin once daily. Remove & Discard patch within 12 hours or as directed by MD 12 patch 5/23/2023 -- Aislinn Whitehead PA-C    methocarbamoL (ROBAXIN) 750 MG Tab Take 2 tablets (1,500 mg total) by mouth every 8 (eight) hours as needed. 16 tablet 5/23/2023 -- Aislinn Whitehead PA-C          Follow-up Information       Follow up With Specialties Details Why Contact Info    Lupe Rodriguez PA-C Orthopedic Surgery Schedule an appointment as soon as possible for a visit in 1 week  [address] [address]  [phone]      [facility] - Emergency Dept Emergency Medicine  If symptoms worsen [address] [id number] [phone]          Future Appointments   Date Time Provider Department Center   5/25/2023  1:30 PM Judy Perez PT, BCB-PMD [facility] UR THER [facility]   6/23/2023 11:00 AM [facility] EMG PROCEDURAL LAB [facility] NEURO [facility]   6/23/2023  2:00 PM PERIMETRY, HUMPH [facility] OPHTHAL [facility]   6/23/2023  2:30 PM Jose M Alberto MD [facility] OPHTHAL [facility]   6/27/2023 11:00 AM Judy Perez PT, BCB-PMD [facility] UR THER [facility]          Aislinn Whitehead PA-C  05/23/23 2027       Omaira Silverio MD  05/29/23 1611

## 2023-05-23 NOTE — Clinical Note
"Scott Mcguire" Elliott was seen and treated in our emergency department on 5/23/2023.  He may return to work on 05/25/2023.       If you have any questions or concerns, please don't hesitate to call.      Aislinn Whitehead PA-C"

## 2023-05-23 NOTE — ED NOTES
LOC: The patient is awake, alert, and oriented to self, place, time, and situation. Pt is calm and cooperative. Affect is appropriate.  Speech is appropriate and clear.      APPEARANCE: Patient resting comfortably. Patient is clean and well groomed.     SKIN: The skin is warm and dry; color consistent with ethnicity.  Patient has normal skin turgor and moist mucus membranes.  Skin intact; no breakdown or bruising noted.      MUSCULOSKELETAL: Patient moving upper and lower extremities without difficulty; denies pain in the extremities or back.      RESPIRATORY: Airway is open and patent. Respirations spontaneous, even, easy, and non-labored.  Patient has a normal effort and rate.  No accessory muscle use noted. Denies cough.      CARDIAC:   No peripheral edema noted. No complaints of chest pain. Radial pulse regular. Denies chest pain.     ABDOMEN: Soft and non tender to palpation.  No distention noted. Pt denies abdominal pain; denies nausea, vomiting, diarrhea, or constipation.     NEUROLOGIC: Eyes open spontaneously.  Behavior appropriate to situation.  Follows commands; facial expression symmetrical.  Purposeful motor response noted; normal sensation in all extremities.

## 2023-05-23 NOTE — ED NOTES
Fell out of office chair and hit tailbone and lost vision for a second and hearing for a second. Hx chronic back issues. C/o midlower back pain. Denies hitting head or LOC.

## 2023-05-24 ENCOUNTER — TELEPHONE (OUTPATIENT)
Dept: ORTHOPEDICS | Facility: CLINIC | Age: 33
End: 2023-05-24
Payer: COMMERCIAL

## 2023-05-24 DIAGNOSIS — M54.16 LUMBAR RADICULOPATHY, CHRONIC: Primary | ICD-10-CM

## 2023-05-24 NOTE — TELEPHONE ENCOUNTER
Spoke to patient in regards to scheduling an mri and audio visit. Patient scheduled for 7:45 am at the [facility] for mri on 05/29/2023 and virtual audio for 9:15 am on 06/08/2023. Patient stated thank you. Thanks.

## 2023-05-24 NOTE — DISCHARGE INSTRUCTIONS
Your x-rays do not show any fracture or dislocation.  You can take acetaminophen/tylenol 650 mg every 6 hours or 1000 mg every 8 hours for added relief.  Take robaxin every 8 hours as needed for spasms.   Apply lidocaine patch every 12 hours as needed.  Apply ice to the area for 10-20 minutes every 4 hours. You can apply heat 2 days after for the same duration and frequency.  Follow up with Ortho in 1 week.  Return to the ER for new or worsening symptoms.  Future Appointments   Date Time Provider Department Center   5/25/2023  1:30 PM Judy Perez PT, BCB-PMD [facility] UR THER [facility]   6/23/2023 11:00 AM [facility] EMG PROCEDURAL LAB [facility] NEURO [facility]   6/23/2023  2:00 PM PERIMETRYSOMMER [facility] OPHTHAL [facility]   6/23/2023  2:30 PM Jose M Alberto MD [facility] OPHTHAL [facility]   6/27/2023 11:00 AM Judy Perez PT, BCB-PMD [facility] UR THER [facility]

## 2023-05-24 NOTE — PROGRESS NOTES
Spoke with pt virtually. He was last seen on 2/27/23 for low back and left leg pain. [facility] with no relief. Yesterday a chair came from under him and he fell on his buttocks, which worsened his chronic pain. Given failure of conservative rx and new injury will obtain MRI to further evaluate and call with results

## 2023-05-26 ENCOUNTER — PATIENT MESSAGE (OUTPATIENT)
Dept: ORTHOPEDICS | Facility: CLINIC | Age: 33
End: 2023-05-26
Payer: COMMERCIAL

## 2023-05-29 ENCOUNTER — PATIENT MESSAGE (OUTPATIENT)
Dept: ORTHOPEDICS | Facility: CLINIC | Age: 33
End: 2023-05-29
Payer: COMMERCIAL

## 2023-05-29 ENCOUNTER — HOSPITAL ENCOUNTER (OUTPATIENT)
Dept: RADIOLOGY | Facility: HOSPITAL | Age: 33
Discharge: HOME | End: 2023-05-29
Attending: ORTHOPAEDIC SURGERY
Payer: COMMERCIAL

## 2023-05-29 DIAGNOSIS — M54.16 LUMBAR RADICULOPATHY, CHRONIC: Primary | ICD-10-CM

## 2023-05-29 DIAGNOSIS — M54.16 LUMBAR RADICULOPATHY, CHRONIC: ICD-10-CM

## 2023-05-29 PROCEDURE — 72148 MRI LUMBAR SPINE W/O DYE: CPT | Mod: 26,,, | Performed by: RADIOLOGY

## 2023-05-29 PROCEDURE — 72148 MRI LUMBAR SPINE WITHOUT CONTRAST: ICD-10-PCS | Mod: 26,,, | Performed by: RADIOLOGY

## 2023-05-29 PROCEDURE — 72148 MRI LUMBAR SPINE W/O DYE: CPT | Mod: TC

## 2023-05-29 NOTE — PROGRESS NOTES
DATE: 5/29/2023  PATIENT: Scott Gallagher    Attending Physician: Raleigh Bond MD    HISTORY:  Scott Gallagher is a 32 y.o. male who returns to me today for follow up.  He was last seen by me 2/27/2023.  Today he is doing well but notes he continues to have left sided low back pain with some leg pain. [facility] for [facility] with no relief. He is also taking gabapentin regularly    The Patient denies myelopathic symptoms such as handwriting changes or difficulty with buttons/coins/keys. Denies perineal paresthesias, bowel/bladder dysfunction.    PMH/PSH/FamHx/SocHx:  Unchanged from prior visit    ROS:  REVIEW OF SYSTEMS:  Constitution: Negative. Negative for chills, fever and night sweats.   HENT: Negative for congestion and headaches.    Eyes: Negative for blurred vision, left vision loss and right vision loss.   Cardiovascular: Negative for chest pain and syncope.   Respiratory: Negative for cough and shortness of breath.    Endocrine: Negative for polydipsia, polyphagia and polyuria.   Hematologic/Lymphatic: Negative for bleeding problem. Does not bruise/bleed easily.   Skin: Negative for dry skin, itching and rash.   Musculoskeletal: Negative for falls and muscle weakness.   Gastrointestinal: Negative for abdominal pain and bowel incontinence.   Allergic/Immunologic: Negative for hives and persistent infections.  Genitourinary: Negative for urinary retention/incontinence and nocturia.   Neurological: negative for disturbances in coordination, no myelopathic symptoms such as handwriting changes or difficulty with buttons, coins, keys or small objects. No loss of balance and seizures.   Psychiatric/Behavioral: Negative for depression. The patient does not have insomnia.   Denies perineal paresthesias, bowel or bladder incontinence    EXAM:  There were no vitals taken for this visit.    My physical examination was notable for the following findings:     Musculoskeletal and neuro exam stable      IMAGING:      AP, Lat and Flex/Ex  upright L-spine films that demonstrate mild lumbosacral disc space narrowing     MRI lumbar demonstrates left paracentral disc bulge at L4-5    There is no height or weight on file to calculate BMI.    Hemoglobin A1C   Date Value Ref Range Status   02/17/2023 5.5 4.0 - 5.6 % Final     Comment:     ADA Screening Guidelines:  5.7-6.4%  Consistent with prediabetes  >or=6.5%  Consistent with diabetes    High levels of fetal hemoglobin interfere with the HbA1C  assay. Heterozygous hemoglobin variants (HbS, HgC, etc)do  not significantly interfere with this assay.   However, presence of multiple variants may affect accuracy.     12/05/2019 4.6 4.0 - 5.6 % Final     Comment:     ADA Screening Guidelines:  5.7-6.4%  Consistent with prediabetes  >or=6.5%  Consistent with diabetes  High levels of fetal hemoglobin interfere with the HbA1C  assay. Heterozygous hemoglobin variants (HbS, HgC, etc)do  not significantly interfere with this assay.   However, presence of multiple variants may affect accuracy.           ASSESSMENT/PLAN:    There are no diagnoses linked to this encounter.    Today we discussed at length all of the different treatment options including anti-inflammatories, acetaminophen, rest, ice, heat, physical therapy including strengthening and stretching exercises, home exercises, ROM, aerobic conditioning, aqua therapy, other modalities including ultrasound, massage, and dry needling, epidural steroid injections and finally surgical intervention.      Pt presents with chronic low back pain and radiculopathy. Failure of conservative rx. Will order IL L4-5 SUSANNA with pain managementt

## 2023-05-30 ENCOUNTER — TELEPHONE (OUTPATIENT)
Dept: PAIN MEDICINE | Facility: CLINIC | Age: 33
End: 2023-05-30
Payer: COMMERCIAL

## 2023-05-30 DIAGNOSIS — M54.16 LUMBAR RADICULOPATHY: Primary | ICD-10-CM

## 2023-05-30 NOTE — TELEPHONE ENCOUNTER
Spoke to the patient on the phone about the procedure that was ordered for them. He would like to proceed with the procedure.  He was informed that this is would take place at the new [facility] on [address] next to [employer].  He was informed to go to the 2nd floor for check in.  We dicussed sedation options and will proceed with Oral Sedation. The patient was told that they do not need to fast before this procedure.  They can eat and drink like normal.  They were informed that they CANNOT drive themselves and must have a .  They were informed that they will get a phone call closer to the date of the procedure with more instructions including the time of arrival..    He denies that they are on blood thinning medications.  They were instructed regarding the anticoagulation requirements if they are on blood thinners.  All questions were answered.    Dom Fulton

## 2023-05-30 NOTE — TELEPHONE ENCOUNTER
----- Message from Lupe Rodriguez PA-C sent at 2023  2:03 PM CDT -----  Regarding: Order for LIZ MEZA    Patient Name: LIZ MEZA([id number])  Sex: Male  : 1990      PCP: LETA KAYE    Center: None     Types of orders made on 2023: Procedure Request    Order Date:2023  Ordering User:LUPE RODRIGUEZ [[id number]]  Encounter Provider:Lupe Rodriguez PA-C [[id number]]  Authorizing Provider:   Tal Rodriguez PA-C [[id number]]  Supervising Provider:IOANA SWANSON [[id number]]  Type of Supervision:Supervision Required  Department:[facility][[id number]]    Common Order Information  Procedure -> Epidural Injection (specify level) Cmt: IL L4-5 (aim left)    Order Specific Information  Order: Procedure Order to Pain Management [Custom: [id number]]  Order #:          [id number]: 1 FUTURE    Priority:   Routine  Class: Clinic Performed    Future Order Information      Expires on:2024            Expected by:2023                   Associated Diagnoses      M54.16 Lumbar radiculopathy, chronic      Facility Name: -> [facility]           Priority: Routine  Class: Clinic Performed    Future Order Information      Expires on:2024            Expected by:2023                     Associated Diagnoses      M54.16 Lumbar radiculopathy, chronic      Procedure -> Epidural Injection (specify level) Cmt: IL L4-5 (aim left)        Facility Name: -> [facility]

## 2023-06-23 ENCOUNTER — CLINICAL SUPPORT (OUTPATIENT)
Dept: OPHTHALMOLOGY | Facility: CLINIC | Age: 33
End: 2023-06-23
Payer: COMMERCIAL

## 2023-06-23 ENCOUNTER — PROCEDURE VISIT (OUTPATIENT)
Dept: NEUROLOGY | Facility: CLINIC | Age: 33
End: 2023-06-23
Payer: COMMERCIAL

## 2023-06-23 ENCOUNTER — OFFICE VISIT (OUTPATIENT)
Dept: OPHTHALMOLOGY | Facility: CLINIC | Age: 33
End: 2023-06-23
Payer: COMMERCIAL

## 2023-06-23 DIAGNOSIS — R20.2 LEFT HAND PARESTHESIA: ICD-10-CM

## 2023-06-23 DIAGNOSIS — H53.9 VISUAL DISTURBANCE: ICD-10-CM

## 2023-06-23 DIAGNOSIS — G56.02 CARPAL TUNNEL SYNDROME OF LEFT WRIST: Primary | ICD-10-CM

## 2023-06-23 DIAGNOSIS — I61.0 NONTRAUMATIC SUBCORTICAL HEMORRHAGE OF RIGHT CEREBRAL HEMISPHERE: ICD-10-CM

## 2023-06-23 DIAGNOSIS — G43.509 PERSISTENT MIGRAINE AURA WITHOUT CEREBRAL INFARCTION AND WITHOUT STATUS MIGRAINOSUS, NOT INTRACTABLE: Primary | ICD-10-CM

## 2023-06-23 PROCEDURE — 99999 PR PBB SHADOW E&M-EST. PATIENT-LVL III: CPT | Mod: PBBFAC,,, | Performed by: OPHTHALMOLOGY

## 2023-06-23 PROCEDURE — 3044F HG A1C LEVEL LT 7.0%: CPT | Mod: CPTII,S$GLB,, | Performed by: OPHTHALMOLOGY

## 2023-06-23 PROCEDURE — 4010F PR ACE/ARB THEARPY RXD/TAKEN: ICD-10-PCS | Mod: CPTII,S$GLB,, | Performed by: OPHTHALMOLOGY

## 2023-06-23 PROCEDURE — 1159F MED LIST DOCD IN RCRD: CPT | Mod: CPTII,S$GLB,, | Performed by: OPHTHALMOLOGY

## 2023-06-23 PROCEDURE — 1160F PR REVIEW ALL MEDS BY PRESCRIBER/CLIN PHARMACIST DOCUMENTED: ICD-10-PCS | Mod: CPTII,S$GLB,, | Performed by: OPHTHALMOLOGY

## 2023-06-23 PROCEDURE — 95913 NRV CNDJ TEST 13/> STUDIES: CPT | Mod: S$GLB,,, | Performed by: PSYCHIATRY & NEUROLOGY

## 2023-06-23 PROCEDURE — 1159F PR MEDICATION LIST DOCUMENTED IN MEDICAL RECORD: ICD-10-PCS | Mod: CPTII,S$GLB,, | Performed by: OPHTHALMOLOGY

## 2023-06-23 PROCEDURE — 95886 PR EMG COMPLETE, W/ NERVE CONDUCTION STUDIES, 5+ MUSCLES: ICD-10-PCS | Mod: S$GLB,,, | Performed by: PSYCHIATRY & NEUROLOGY

## 2023-06-23 PROCEDURE — 99203 OFFICE O/P NEW LOW 30 MIN: CPT | Mod: S$GLB,,, | Performed by: OPHTHALMOLOGY

## 2023-06-23 PROCEDURE — 3044F PR MOST RECENT HEMOGLOBIN A1C LEVEL <7.0%: ICD-10-PCS | Mod: CPTII,S$GLB,, | Performed by: OPHTHALMOLOGY

## 2023-06-23 PROCEDURE — 95886 MUSC TEST DONE W/N TEST COMP: CPT | Mod: S$GLB,,, | Performed by: PSYCHIATRY & NEUROLOGY

## 2023-06-23 PROCEDURE — 99203 PR OFFICE/OUTPT VISIT, NEW, LEVL III, 30-44 MIN: ICD-10-PCS | Mod: S$GLB,,, | Performed by: OPHTHALMOLOGY

## 2023-06-23 PROCEDURE — 99999 PR PBB SHADOW E&M-EST. PATIENT-LVL III: ICD-10-PCS | Mod: PBBFAC,,, | Performed by: OPHTHALMOLOGY

## 2023-06-23 PROCEDURE — 1160F RVW MEDS BY RX/DR IN RCRD: CPT | Mod: CPTII,S$GLB,, | Performed by: OPHTHALMOLOGY

## 2023-06-23 PROCEDURE — 95913 PR NERVE CONDUCTION STUDY; 13 OR MORE STUDIES: ICD-10-PCS | Mod: S$GLB,,, | Performed by: PSYCHIATRY & NEUROLOGY

## 2023-06-23 PROCEDURE — 4010F ACE/ARB THERAPY RXD/TAKEN: CPT | Mod: CPTII,S$GLB,, | Performed by: OPHTHALMOLOGY

## 2023-06-23 NOTE — PROGRESS NOTES
HPI    Referred by Dr.Stephanie Overton NP  Pt states hx of persistent migraine. OD kidney shape aura on right side   peripheral.(In his field of vision-2020)  Hx of stroke 2021.  Vision stable.   No eye pain or headache.    I have personally interviewed the patient, reviewed the history and   examined the patient and agree with the technician's exam.     Pulsating light in both eyes superior and to the right of fixation.   Last edited by Jose M Alberto MD on 6/23/2023  2:46 PM.            Assessment /Plan     For exam results, see Encounter Report.    Persistent migraine aura without cerebral infarction and without status migrainosus, not intractable      No evidence of an ocular abnromality. Return as needed,.

## 2023-06-27 ENCOUNTER — CLINICAL SUPPORT (OUTPATIENT)
Dept: REHABILITATION | Facility: HOSPITAL | Age: 33
End: 2023-06-27
Attending: SURGERY
Payer: COMMERCIAL

## 2023-06-27 DIAGNOSIS — M62.89 PELVIC FLOOR DYSFUNCTION: Primary | ICD-10-CM

## 2023-06-27 PROCEDURE — 97112 NEUROMUSCULAR REEDUCATION: CPT

## 2023-06-27 NOTE — PROGRESS NOTES
Pelvic Health Physical Therapy   Treatment Note     Name: Scott Gallagher  Clinic Number: [id number]    Therapy Diagnosis:   Encounter Diagnosis   Name Primary?    Pelvic floor dysfunction Yes     Physician: Sun Finley MD    Visit Date: 6/27/2023    Physician Orders: PT Eval and Treat    Medical Diagnosis from Referral: Anal pain [K62.89]  Evaluation Date: 4/20/2023  Authorization Period Expiration: 7/9/2023  Plan of Care Expiration: 7/20/2023  Visit # / Visits authorized: 3/12  Cancelled Visits: 1  No Show Visits: 0    Time In: 11:00  Time Out: 11:55  Total Billable Time: 55 minutes    Precautions: Standard    Subjective     Pt reports: that he had a fall and subsequent back injury.  Now feeling better.  Has had bladder pain due to Robaxin use (also diarrhea).  Has been able to start using the dilator set from [employer] and he has gotten to the largest size, and is contemplating moving to a large set.  Has not attempted intercourse yet.  No bleeding with the dilators.      He was compliant with home exercise program.  Response to previous treatment: no adverse effects  Functional change: increased muscle awareness per pt report.  Tolerating penetration with dilators.    Pain: 0/10    Objective     Scott participated in neuromuscular re-education activities to develop Coordination and Down training for 55 minutes including: Pelvic floor downtraining with assist of sEMG  We worked in [facility] with external lead wires.  He demonstrated slightly elevated baseline resting.  He then used the second to largest dilator after applying lubricant to his perianal area- he had some difficulty inserting the dilator with the SEMG leads placed, so these were removed.  He was then instructed in DB rhythm, dilator movement challenge, and pelvic and LE movement challenge to add to his dilator work.  We also discussed dilator work prior to attempts at penetration.      Home Exercises Provided and Patient Education Provided      Education provided:   - posture/body mechanices  Discussed progression of plan of care with patient; educated pt in activity modification; reviewed HEP with pt. Pt demonstrated and verbalized understanding of all instruction and was provided with a handout of HEP (see Patient Instructions).    Written Home Exercises Provided: yes.  Exercises were reviewed and Scott was able to demonstrate them prior to the end of the session.  Scott demonstrated good  understanding of the education provided.     See EMR under Patient Instructions for exercises provided 6/27/2023    Assessment     Pt is comfortable using his dilator set at home-will reassess symptoms next session.     Scott Is progressing well towards his goals.   Pt prognosis is Good.     Pt will continue to benefit from skilled outpatient physical therapy to address the deficits listed in the problem list box on initial evaluation, provide pt/family education and to maximize pt's level of independence in the home and community environment.     Pt's spiritual, cultural and educational needs considered and pt agreeable to plan of care and goals.     Anticipated barriers to physical therapy: none    Goals:  12 weeks   Pt will verbalize improved awareness of PFM activity as palpated by PT in order to improve activity involvement with HEP.  Pt will report successfully having intercourse with < or = 2/10 pain for an improvement in activity tolerance.   Pt will report < or = 2/10 pain with urination/defecation 80% of the time.   Pt/family will be independent with HEP for continued self-management of symptoms.   ALL PROGRESSING    Plan     Plan of care Certification: 4/20/2023 to 7/20/2023.     Outpatient Physical Therapy 1 times per 2 week(s)  for 12 weeks to include the following interventions: therapeutic exercises, therapeutic activity, neuromuscular re-education, manual therapy, modalities PRN, patient/family education, and self care/home management    Judy GARZA  Ana, PT, BCB-PMSHARON

## 2023-06-27 NOTE — PATIENT INSTRUCTIONS
6/27/2023    Continue to use the dilators every 1-2 days.  No more than 20-30 minutes at a time.  Best position initially will be in sidelying.  Use one of the smaller  dilators to apply lubricant internally.  Then choose a larger one, apply more lube, and work on gently inserting it with consistent pressure.  Use diaphragmatic breathing to work on opening when you inhale- note the movement down/up of the pelvic floor with inhale/exhale.  When a dilator is completely inserted, work on twisting it and moving it in and out.  You can also move a leg, or tilt your pelvis to add challenge.      When you are ready to move to the larger set, do so.  When you are ready to attempt intercourse, consider prestretching with dilators prior.

## 2023-07-05 ENCOUNTER — PATIENT MESSAGE (OUTPATIENT)
Dept: ORTHOPEDICS | Facility: CLINIC | Age: 33
End: 2023-07-05
Payer: COMMERCIAL

## 2023-07-05 DIAGNOSIS — M79.642 LEFT HAND PAIN: Primary | ICD-10-CM

## 2023-07-11 ENCOUNTER — PATIENT MESSAGE (OUTPATIENT)
Dept: ORTHOPEDICS | Facility: CLINIC | Age: 33
End: 2023-07-11
Payer: COMMERCIAL

## 2023-07-11 ENCOUNTER — PATIENT MESSAGE (OUTPATIENT)
Dept: NEUROLOGY | Facility: CLINIC | Age: 33
End: 2023-07-11
Payer: COMMERCIAL

## 2023-07-12 ENCOUNTER — TELEPHONE (OUTPATIENT)
Dept: ORTHOPEDICS | Facility: CLINIC | Age: 33
End: 2023-07-12
Payer: COMMERCIAL

## 2023-07-20 NOTE — PRE-PROCEDURE INSTRUCTIONS
7/20/2023 @ 1246: Pt returned RN's call. Pre-procedure interview completed. Pt verbalized understanding.    Unable to reach pt via phone. Left message with instructions along with a request for a call back. RN emphasized for pt to call back if they have been taking abx in the past 2 weeks. The following was sent to pt portal.    Dear Scott ,    You are scheduled for a procedure with Dr. Dom Jha on 7/21/2023.  Your scheduled arrival time is 7:45am.  This arrival time is roughly 1 hour before your anticipated procedure time to allow sufficient time for pre-op..  Please wear comfortable clothes.  Most patients do not need to change into a gown.  Please do not wear a dress.  This procedure will take place at the [facility] at the corner of [facility] and [facility].  It [facility] next to [facility].  The address is:    [address] [place] [address].  [place] [address]    After entering the building, you will proceed to the second floor where you can check in with registration. You should take any medications that you routinely take for blood pressure, heart medications, thyroid, cholesterol, etc.     The fasting restrictions are dependent on whether or not you are receiving sedation.  Sedation is not available for all procedures.     Your fasting instructions are as follow:  Oral Sedation. You do not need to fast before this procedure.  You can eat and drink like normal.  You CANNOT drive yourself and must have a .    If you are on blood thinners, you need to follow the anticoagulation instructions that had been discussed previously.  You should only stop the blood thinners if it was approved by your primary care physician or your cardiologist.  In the event that you are not able to stop your blood thinners, a blood thinner was not listed on your medication list, or we were not able to get clearance from your cardiologist, then the procedure may have to  be postponed/canceled.     IF you were told to stop your blood thinners, this is how long you should generally hold some of the more common ones.  Remember that stopping blood thinners is only necessary for certain procedures. If you are unsure of your instructions, please call us.   Aspirin - 5 days  Plavix/Clopidogrel - 7 days  Warfarin / Coumadin - 5 days  Eliquis - 3 days  Pradaxa/Dabigatran - 4 days  Xarelto/Rivaroxaban - 3 days    If you are a diabetic, do not take your medication if you will be fasting, but bring it with you. Please plan on being here for roughly 2 hours.     Please call us if you have been sick (running fever, having any flu-like symptoms) or have been taking antibiotics in the past 2 weeks or had any outpatient procedures other than with us (colonoscopy, endoscopy, OBGYN, dental, etc.). If you have been previously COVID positive, you will need to hold off on your procedure until you are symptom free for 10 days. If you did not have any symptoms, you can have your procedure 10 days from your positive test result.      Thank you,  [facility]

## 2023-07-21 ENCOUNTER — HOSPITAL ENCOUNTER (OUTPATIENT)
Facility: HOSPITAL | Age: 33
Discharge: HOME | End: 2023-07-21
Attending: STUDENT IN AN ORGANIZED HEALTH CARE EDUCATION/TRAINING PROGRAM | Admitting: STUDENT IN AN ORGANIZED HEALTH CARE EDUCATION/TRAINING PROGRAM
Payer: COMMERCIAL

## 2023-07-21 VITALS
DIASTOLIC BLOOD PRESSURE: 84 MMHG | RESPIRATION RATE: 16 BRPM | BODY MASS INDEX: 39.17 KG/M2 | SYSTOLIC BLOOD PRESSURE: 124 MMHG | TEMPERATURE: 98 F | HEART RATE: 68 BPM | HEIGHT: 75 IN | OXYGEN SATURATION: 98 % | WEIGHT: 315 LBS

## 2023-07-21 DIAGNOSIS — R51.9 NONINTRACTABLE HEADACHE, UNSPECIFIED CHRONICITY PATTERN, UNSPECIFIED HEADACHE TYPE: Primary | ICD-10-CM

## 2023-07-21 DIAGNOSIS — G43.109 MIGRAINE AURA WITHOUT HEADACHE: Primary | ICD-10-CM

## 2023-07-21 DIAGNOSIS — M54.16 LUMBAR RADICULOPATHY: Primary | ICD-10-CM

## 2023-07-21 PROCEDURE — 25000003 PHARM REV CODE 250: Performed by: STUDENT IN AN ORGANIZED HEALTH CARE EDUCATION/TRAINING PROGRAM

## 2023-07-21 PROCEDURE — 62323 NJX INTERLAMINAR LMBR/SAC: CPT | Mod: ,,, | Performed by: STUDENT IN AN ORGANIZED HEALTH CARE EDUCATION/TRAINING PROGRAM

## 2023-07-21 PROCEDURE — 62323 PR INJ LUMBAR/SACRAL, W/IMAGING GUIDANCE: ICD-10-PCS | Mod: ,,, | Performed by: STUDENT IN AN ORGANIZED HEALTH CARE EDUCATION/TRAINING PROGRAM

## 2023-07-21 PROCEDURE — 62323 NJX INTERLAMINAR LMBR/SAC: CPT | Performed by: STUDENT IN AN ORGANIZED HEALTH CARE EDUCATION/TRAINING PROGRAM

## 2023-07-21 PROCEDURE — 25500020 PHARM REV CODE 255: Performed by: STUDENT IN AN ORGANIZED HEALTH CARE EDUCATION/TRAINING PROGRAM

## 2023-07-21 PROCEDURE — 63600175 PHARM REV CODE 636 W HCPCS: Performed by: STUDENT IN AN ORGANIZED HEALTH CARE EDUCATION/TRAINING PROGRAM

## 2023-07-21 RX ORDER — LIDOCAINE HYDROCHLORIDE 20 MG/ML
INJECTION, SOLUTION EPIDURAL; INFILTRATION; INTRACAUDAL; PERINEURAL
Status: DISCONTINUED | OUTPATIENT
Start: 2023-07-21 | End: 2023-07-21 | Stop reason: HOSPADM

## 2023-07-21 RX ORDER — DEXAMETHASONE SODIUM PHOSPHATE 10 MG/ML
INJECTION INTRAMUSCULAR; INTRAVENOUS
Status: DISCONTINUED | OUTPATIENT
Start: 2023-07-21 | End: 2023-07-21 | Stop reason: HOSPADM

## 2023-07-21 RX ORDER — ALPRAZOLAM 0.5 MG/1
0.5 TABLET, ORALLY DISINTEGRATING ORAL ONCE AS NEEDED
Status: COMPLETED | OUTPATIENT
Start: 2023-07-21 | End: 2023-07-21

## 2023-07-21 RX ADMIN — ALPRAZOLAM 0.5 MG: 0.5 TABLET, ORALLY DISINTEGRATING ORAL at 08:07

## 2023-07-21 NOTE — PATIENT INSTRUCTIONS
[facility]  [facility]  [place] service # [phone]    POST-PROCEDURE INSTRUCTIONS:    Today you had an injection that included a steroid medications.  The steroid may or may not have been mixed with a local anesthetic when it was injected.   If the injection was in the neck, you may feel some pressure, numbness, or slight weakness in the arm after the procedure for a short period of time (this is a normal response), if this persists for longer than 1 day please contact our office or go to the emergency room.  If the injection was in the low back, you may feel some pressure, numbness, or slight weakness in the leg after the procedure for a short period of time (this is a normal response), if this persists for longer than 1 day please contact our office or go to the emergency room.  You may get side effects from the steroid.  This is not uncommon.  Symptoms include: elevated blood sugar, elevated blood pressure, headache, flushing, nausea, insomnia.  These symptoms are transient and will resolve within 1-3 days.  If symptoms last longer than this please contact our office or head to the emergency room.  Steroid medications can take anywhere from 3-14 days to take effect (rarely longer).  You may notice that your pain worsens for a short period of time after the injection, this would not be unusual due to the pressure and trauma from the needle.    If you do not have a follow up appointment scheduled, please contact my office (or the office of the physician who referred you for the procedure) to get a post-procedure follow up scheduled 2-4 weeks after the procedure.  This can be done as a virtual visit if that is more convenient for you.      What you need to do:    Keep a record of your response to the injection you had today.    How much relief did you get?   When did the relief start and how long did it last?  Were you able to decrease the use of any of your pain  medications?  Were you able to increase your level of activity?  How long did the relief last?    What to watch out for:    If you experience any of the following symptoms after your procedure, please notify the messaging service immediately (see above for contact information):   fever (increased oral temperature)   bleeding or swelling at the injection site,    drainage, rash or redness at the injection site    possible signs of infection    increased pain at the injection site   worsening of your usual pain   severe headache   new or worsening numbness    new arm and/or leg weakness, or    changes in bowel and/or bladder function: urinating or defecating on yourself and not knowing that you did it.    PLEASE FOLLOW ALL INSTRUCTIONS CAREFULLY     Do not engage in strenuous activity (e.g., lifting or pushing heavy objects or repeated bending) for 24 hours.     Do not take a bath, swim or use Jacuzzi for 24 hours after procedure. (A shower is fine).   Remove any Band-Aids when you get home.    Use cold/ice, as needed for comfort.  We recommend the use of cold therapy alternating on for 20 minutes, off for 20 minutes.    Do not apply direct heat (heating pad or heat packs) to the injection site for 24 hours.     Resume your usual medications, unless instructed otherwise by your Pain Physician.     If you are on warfarin (Coumadin) or other blood thinner, resume this medication as instructed by your prescribing Physician.    IF AT ANY POINT YOU ARE VERY CONCERNED ABOUT YOUR SYMPTOMS, PLEASE GO TO THE EMERGENCY ROOM.    If you develop worsening pain, weakness, numbness, lose bowel or bladder control (i.e., having an accident where you did not even know you had to go to the bathroom and suddenly noticed you soiled yourself), saddle anesthesia (a loss of sensation restricted to the area of the buttocks, anus and between the legs -- i.e., those parts of your body that would touch a saddle if you were sitting on one) you  need to go immediately to the emergency department for evaluation and treatment.    ----------------------------------------------------------------------------------------------------------------------------------------------------------------  If you received Sedation please read the following instructions:  POST SEDATION INSTRUCTIONS    Today you received intravenous medication (also known as sedation) that was used to help you relax and/or decrease discomfort during your procedure. This medication will be acting in your body for the next 24 hours, so you might feel a little tired or sleepy. This feeling will slowly wear off.   Common side effects associated with these medications include: drowsiness, dizziness, sleepiness, confusion, feeling excited, difficulty remembering things, lack of steadiness with walking or balance, loss of fine muscle control, slowed reflexes, difficulty focusing, and blurred vision.  Some over-the-counter and prescription medications (e.g., muscle relaxants, opioids, mood-altering medications, sedatives/hypnotics, antihistamines) can interact with the intravenous medication you received and cause an increased risk of the side effects listed above in addition to other potentially life threatening side effects. Use extreme caution if you are taking such medications, and consult with your Pain Physician or prescribing physician if you have any questions.  For the next 12-24 hours:    DO NOT--Drive a car, operate machinery or power tools   DO NOT--Drink any alcoholic beverages (not even beer), they may dangerously increase the risk of side effects.    DO NOT--Make any important legal or business decisions or sign important documents.  We advise you to have someone to assist you at home. Move slowly and carefully. Do not make sudden changes in position. Be aware of dizziness or light-headedness and move accordingly.   If you seek medical treatment within 24 hours, let the nurse or doctor  caring for you know that you have received the above medications. If you have any questions or concerns related to your sedation or treatment today please contact us.

## 2023-07-21 NOTE — DISCHARGE SUMMARY
[facility] [place]  Discharge Note  Short Stay    Procedure(s) (LRB):  L4-5 LESI (toward the left) (N/A)      OUTCOME: Patient tolerated treatment/procedure well without complication and is now ready for discharge.    DISPOSITION: Home or Self Care    FINAL DIAGNOSIS:  <principal problem not specified>    FOLLOWUP: In clinic    DISCHARGE INSTRUCTIONS:  No discharge procedures on file.     TIME SPENT ON DISCHARGE: 10 minutes

## 2023-07-21 NOTE — PLAN OF CARE
Discharge instructions given and explained to patient and family with verbalization of understanding all instructions. Patients v/s stable, denies n/v and tolerating po, rates pain level tolerable, IV removed, patient ready to discharge home.

## 2023-07-21 NOTE — H&P
HPI  Patient presenting for Procedure(s) (LRB):  L4-5 LESI (toward the left) (N/A)     Patient on Anti-coagulation No    No health changes since previous encounter    Past Medical History:   Diagnosis Date    Hypertension      Past Surgical History:   Procedure Laterality Date    APPENDECTOMY  2004    WISDOM TOOTH EXTRACTION  2011     Review of patient's allergies indicates:   Allergen Reactions    Anti-nausea [phosphorated carbohydrate] Hives     Pt unsure of name of Rx; states 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      Current Facility-Administered Medications   Medication    alprazolam ODT dissolvable tablet 0.5 mg       PMHx, PSHx, Allergies, Medications reviewed in epic    ROS negative except pain complaints in HPI    OBJECTIVE:    There were no vitals taken for this visit.    PHYSICAL EXAMINATION:    GENERAL: Well appearing, in no acute distress, alert and oriented x3.  PSYCH:  Mood and affect appropriate.  SKIN: Skin color, texture, turgor normal, no rashes or lesions which will impact the procedure.  CV: RRR with palpation of the radial artery.  PULM: No evidence of respiratory difficulty, symmetric chest rise. Clear to auscultation.  NEURO: Cranial nerves grossly intact.    Plan:    Proceed with procedure as planned Procedure(s) (LRB):  L4-5 LESI (toward the left) (N/A)    Dom Fulton  07/21/2023

## 2023-07-21 NOTE — OP NOTE
"PROCEDURE:  LUMBAR L4-5 INTERLAMINAR EPIDURAL STEROID INJECTION    Patient Name: Scott Gallagher  MRN: [id number]  DATE OF PROCEDURE: 07/21/2023    INJECTION # 1    DIAGNOSIS: Lumbar Radiculopathy  CPT CODE: 27028      POSTPROCEDURE DIAGNOSIS: Same    PHYSICIAN: Dom Jha DO  NEEDLE TYPE: - 20G 5" Touhy Needle  MEDICATIONS INJECTED: 8cc mixture of 7cc Normal Saline + 10mg Dexamethasone (10mg/ml)  CONTRAST: Omni 300  LOSS OF RESISTANCE DEPTH: 10 cm    Sedation Medications - Oral Xanax 0.5mg    Estimated Blood Loss - <2ml  Drains: None  Specimens Removed: None  Urine Output - Not Measured  Complications: None  Outcome: Good    Informed Consent:  The patient's condition and proposed procedures, risks, and alternatives were discussed with the patient or responsible party.  The patient's / responsible party's questions were answered.   The patient / responsible party appeared to understand and chose to proceed.  Informed consent was obtained.  After obtaining written consent, an IV hep lock was placed. (See nurses notes for details).     Procedure in Detail:  The patient was taken back to the OR suite and placed in a prone position. The skin overlying the injection site was prepped and draped in an aseptic fashion. The target injection site (see above) was identified with fluoroscopy and marked.     Procedural Pause:  A procedural pause verifying correct patient, medical record number, allergies, medications to be administered, current vital signs, and surgical site was performed immediately prior to beginning the procedure.    The skin and subcutaneous tissue overlying the target site of injection for the L4-5 epidural steroid injection was/were anesthetized using 4 mL of 1% lidocaine with a 25-gauge, 1½-inch needle.  The above noted Tuohy needle was advanced under fluoroscopic guidance towards the epidural space. Lateral fluoroscopic imaging was used to confirm depth. The epidural space was identified using a " loss of resistance to saline technique. (See above for loss of resistance depth). A microbore extension tubing was attached to the needle to minimize any movement of the needle during injection or syringe change.  After negative aspiration for heme or CSF, 1ml of contrast was injected to confirm placement and no intrathecal or vascular spread.  After repeat negative aspiration for heme or CSF, the above noted steroid solution was slowly injected in increments. The needle was then retracted approximately detention and the needle track was flushed with 0.5 ml of Lidocaine 1% to clear the needle prior to removal. The Tuohy needle was then removed.     The heart rate, pulse oximetry, and blood pressure were continuously monitored throughout the procedure.  The prpocedure was well tolerated. He was carefully escorted to the recovery room in stable condition. Patient was monitored by RN for recovery period.  The patient will be contacted in the next few days to determine extent of relief.  Patient was given post procedure and discharge instructions to follow at home.  The patient was discharged in a stable condition.    Note Electronically Signed By:  Dom Fulton

## 2023-08-03 ENCOUNTER — OFFICE VISIT (OUTPATIENT)
Dept: NEUROLOGY | Facility: CLINIC | Age: 33
End: 2023-08-03
Payer: COMMERCIAL

## 2023-08-03 VITALS — HEART RATE: 66 BPM | SYSTOLIC BLOOD PRESSURE: 124 MMHG | DIASTOLIC BLOOD PRESSURE: 84 MMHG

## 2023-08-03 DIAGNOSIS — G43.501 PERSISTENT MIGRAINE AURA WITHOUT CEREBRAL INFARCTION AND WITH STATUS MIGRAINOSUS, NOT INTRACTABLE: Primary | ICD-10-CM

## 2023-08-03 DIAGNOSIS — R51.9 NONINTRACTABLE HEADACHE, UNSPECIFIED CHRONICITY PATTERN, UNSPECIFIED HEADACHE TYPE: ICD-10-CM

## 2023-08-03 PROCEDURE — 4010F PR ACE/ARB THEARPY RXD/TAKEN: ICD-10-PCS | Mod: CPTII,S$GLB,, | Performed by: STUDENT IN AN ORGANIZED HEALTH CARE EDUCATION/TRAINING PROGRAM

## 2023-08-03 PROCEDURE — 99215 PR OFFICE/OUTPT VISIT, EST, LEVL V, 40-54 MIN: ICD-10-PCS | Mod: S$GLB,,, | Performed by: STUDENT IN AN ORGANIZED HEALTH CARE EDUCATION/TRAINING PROGRAM

## 2023-08-03 PROCEDURE — 3079F DIAST BP 80-89 MM HG: CPT | Mod: CPTII,S$GLB,, | Performed by: STUDENT IN AN ORGANIZED HEALTH CARE EDUCATION/TRAINING PROGRAM

## 2023-08-03 PROCEDURE — 3079F PR MOST RECENT DIASTOLIC BLOOD PRESSURE 80-89 MM HG: ICD-10-PCS | Mod: CPTII,S$GLB,, | Performed by: STUDENT IN AN ORGANIZED HEALTH CARE EDUCATION/TRAINING PROGRAM

## 2023-08-03 PROCEDURE — 99999 PR PBB SHADOW E&M-EST. PATIENT-LVL III: ICD-10-PCS | Mod: PBBFAC,,, | Performed by: STUDENT IN AN ORGANIZED HEALTH CARE EDUCATION/TRAINING PROGRAM

## 2023-08-03 PROCEDURE — 1159F PR MEDICATION LIST DOCUMENTED IN MEDICAL RECORD: ICD-10-PCS | Mod: CPTII,S$GLB,, | Performed by: STUDENT IN AN ORGANIZED HEALTH CARE EDUCATION/TRAINING PROGRAM

## 2023-08-03 PROCEDURE — 4010F ACE/ARB THERAPY RXD/TAKEN: CPT | Mod: CPTII,S$GLB,, | Performed by: STUDENT IN AN ORGANIZED HEALTH CARE EDUCATION/TRAINING PROGRAM

## 2023-08-03 PROCEDURE — 3044F PR MOST RECENT HEMOGLOBIN A1C LEVEL <7.0%: ICD-10-PCS | Mod: CPTII,S$GLB,, | Performed by: STUDENT IN AN ORGANIZED HEALTH CARE EDUCATION/TRAINING PROGRAM

## 2023-08-03 PROCEDURE — 3044F HG A1C LEVEL LT 7.0%: CPT | Mod: CPTII,S$GLB,, | Performed by: STUDENT IN AN ORGANIZED HEALTH CARE EDUCATION/TRAINING PROGRAM

## 2023-08-03 PROCEDURE — 1159F MED LIST DOCD IN RCRD: CPT | Mod: CPTII,S$GLB,, | Performed by: STUDENT IN AN ORGANIZED HEALTH CARE EDUCATION/TRAINING PROGRAM

## 2023-08-03 PROCEDURE — 99999 PR PBB SHADOW E&M-EST. PATIENT-LVL III: CPT | Mod: PBBFAC,,, | Performed by: STUDENT IN AN ORGANIZED HEALTH CARE EDUCATION/TRAINING PROGRAM

## 2023-08-03 PROCEDURE — 99215 OFFICE O/P EST HI 40 MIN: CPT | Mod: S$GLB,,, | Performed by: STUDENT IN AN ORGANIZED HEALTH CARE EDUCATION/TRAINING PROGRAM

## 2023-08-03 PROCEDURE — 3074F PR MOST RECENT SYSTOLIC BLOOD PRESSURE < 130 MM HG: ICD-10-PCS | Mod: CPTII,S$GLB,, | Performed by: STUDENT IN AN ORGANIZED HEALTH CARE EDUCATION/TRAINING PROGRAM

## 2023-08-03 PROCEDURE — 3074F SYST BP LT 130 MM HG: CPT | Mod: CPTII,S$GLB,, | Performed by: STUDENT IN AN ORGANIZED HEALTH CARE EDUCATION/TRAINING PROGRAM

## 2023-08-03 RX ORDER — LAMOTRIGINE 25 MG/1
TABLET ORAL
Qty: 98 TABLET | Refills: 0 | Status: SHIPPED | OUTPATIENT
Start: 2023-08-03 | End: 2023-08-31

## 2023-08-03 NOTE — PROGRESS NOTES
[facility] - NEUROLOGY 7TH [place] [facility] [place]    Date: 8/3/23  Patient Name: Scott Gallagher   MRN: [id number]   PCP: Stalin Gordillo  Referring Provider: Celena Overton, Beto    Assessment:   Scott Gallagher is a 33 y.o. male presenting for evaluation of persistent migraine aura without cerebral infarction.  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  No imaging findings that would suggest cadasil, additionally this is usually associated with ischemic strokes rather than hemorrhagic.  For persistent migraine aura, will start trial of lamotrigine.  EEG also obtained previously that was unremarkable, but will repeat at this time to evaluate for any epileptic component.      Plan:     Problem List Items Addressed This Visit    None  Visit Diagnoses       Persistent migraine aura without cerebral infarction and with status migrainosus, not intractable    -  Primary    Relevant Orders    EEG,w/awake & asleep record    Nonintractable headache, unspecified chronicity pattern, unspecified headache type                Michelle Bruce MD  [facility]   Department of Neurology/Epilepsy      Patient note was created using MModal Dictation.  Any errors in syntax or even information may not have been identified and edited on initial review prior to signing this note.  Subjective:   Patient seen in consultation at the request of Celena Overton, * for the evaluation of persistent migraine aura. A copy of this note will be sent to the referring physician.        HPI:   Mr. Scott Gallagher is a 33 y.o. male presenting for evaluation of persistent migraine aura.     Had flu of January 2020, may have been covid unclear due to no testing.  6 days of extremely severe cough, and in the middle of that this aura  "started. He describes it as "pulsating", and it is roughly shaped like a kidney bean  in right upper field of vision.  It is a small speck but it pulsates "like it's folding in on itself".        Hx of stroke in 2021.  At the time, he was often having migraine headaches.  He was having symptoms of hypertension as well, was hoping that his diet was enough to control his BP and had stopped taking oral antihypertensives.  He had severe headache followed by poor coordination on left side - for example having trouble buttoning up shirt and clothes. He was found to have a right basal ganglia bleed, has seen vascular neurology who determined likely hypertensive in nature.  Vessel imaging was unremarkable.      He has had 2 episodes of recrudescence of his left hand symptoms.  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  Associated with clumsiness and paresthesias of the left hand he had some difficulty with his speech.      In terms of migraines, he reports only 4-5 headaches in the past year.  There is a family hx of migraine in mother and one of his brothers.  Mother would have ocular auras but hers were not consistent.  No family history of stroke.      PAST MEDICAL HISTORY:  Past Medical History:   Diagnosis Date    Hypertension        PAST SURGICAL HISTORY:  Past Surgical History:   Procedure Laterality Date    APPENDECTOMY  2004    EPIDURAL STEROID INJECTION N/A 7/21/2023    Procedure: L4-5 LESI (toward the left);  Surgeon: Dom Jha DO;  Location: [facility] PAIN [facility]NAGEMENT;  Service: Pain Management;  Laterality: N/A;  oral sedation    WISDOM TOOTH EXTRACTION  2011       CURRENT MEDS:  Current Outpatient Medications   Medication Sig Dispense Refill    amLODIPine (NORVASC) 10 MG tablet Take 1 tablet (10 mg total) by mouth once daily. 90 tablet 3    aspirin 81 MG Chew Take 81 mg by mouth once daily.      gabapentin (NEURONTIN) 300 MG capsule Take 1 capsule (300 mg total) " by mouth every evening. 30 capsule 11    hydroCHLOROthiazide (HYDRODIURIL) 25 MG tablet Take 1 tablet (25 mg total) by mouth once daily. 30 tablet 11    LIDOcaine (LIDODERM) 5 % Place 1 patch onto the skin once daily. Remove & Discard patch within 12 hours or as directed by MD 12 patch 0    olmesartan (BENICAR) 40 MG tablet Take 1 tablet (40 mg total) by mouth once daily. 90 tablet 3    olmesartan-amLODIPin-hcthiazid 40-10-12.5 mg Tab Take 1 tablet by mouth once daily. 90 tablet 3    propranoloL (INDERAL) 10 MG tablet Take 1 tablet (10 mg total) by mouth 3 (three) times daily as needed (for tremor). 90 tablet 3    ibuprofen (ADVIL,MOTRIN) 200 MG tablet Take 200 mg by mouth every 6 (six) hours as needed for Pain.      lamoTRIgine (LAMICTAL) 25 MG tablet 1 tab daily x 14 days, then 2 tabs daily x14 days, then 4 tabs daily x14 days 98 tablet 0    methocarbamoL (ROBAXIN) 750 MG Tab Take 2 tablets (1,500 mg total) by mouth every 8 (eight) hours as needed. 16 tablet 0     No current facility-administered medications for this visit.       ALLERGIES:  Review of patient's allergies indicates:   Allergen Reactions    Anti-nausea [phosphorated carbohydrate] Hives     Pt unsure of name of Rx; states 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       FAMILY HISTORY:  Family History   Problem Relation Age of Onset    Hypertension Mother     Diabetes type II Mother     Hypertension Father     Diabetes type II Father     Hypertension Sister 28    Hypertension Brother     Hypertension Brother     Hypertension Brother     Hypertension Sister        SOCIAL HISTORY:  Social History     Tobacco Use    Smoking status: Never    Smokeless tobacco: Never   Substance Use Topics    Alcohol use: Yes    Drug use: Yes     Types: Marijuana     Comment: very rarely used, edible       Review of Systems:  12 system review of systems is negative except for the symptoms mentioned in HPI.       Objective:     Vitals:    08/03/23 1156   BP: 124/84   Pulse: 66     General: NAD, well nourished   Eyes: no tearing, discharge, no erythema   ENT: moist mucous membranes of the oral cavity, nares patent    Neck: Supple, full range of motion  Cardiovascular: Warm and well perfused, pulses equal and symmetrical  Lungs: Normal work of breathing, normal chest wall excursions  Skin: No rash, lesions, or breakdown on exposed skin  Psychiatry: Mood and affect are appropriate   Abdomen: soft, non tender, non distended  Extremeties: No cyanosis, clubbing or edema.    Neurological   MENTAL STATUS: Alert and oriented to person, place, and time. Attention and concentration within normal limits. Speech without dysarthria, able to name and repeat without difficulty. Recent and remote memory within normal limits   CRANIAL NERVES: Visual fields intact. PERRL. EOMI. Facial sensation intact. Face symmetrical. Hearing grossly intact. Full shoulder shrug bilaterally. Tongue protrudes midline   SENSORY: Sensation is intact to light touch throughout.  Joint position perception intact. Negative Romberg.   MOTOR: Normal bulk and tone. No pronator drift.  5/5 deltoid, biceps, triceps, interosseous, hand  bilaterally. 5/5 iliopsoas, knee extension/flexion, foot dorsi/plantarflexion bilaterally.    REFLEXES: Symmetric and 2+ throughout. Toes down going bilaterally.   CEREBELLAR/COORDINATION/GAIT: Gait steady with normal arm swing and stride length.  Heel to shin intact. Finger to nose intact. Normal rapid alternating movements.

## 2023-08-03 NOTE — PATIENT INSTRUCTIONS
VISIT FOLLOW UP    It was nice to see you today.  Here is what we discussed at your visit:    - Start lamotrigine as follows:  I am going to start you on a medication called Lamotrigine (Lamictal).      This is an anti-seizure medication that is also used as a mood stabilizer.  It is taken twice a day.  This medication is typically started slowly to avoid a skin rash, which is a possible side effect if started too quickly.  Other possible side effects include double vision, dizziness, or nausea.  Please contact me via Scriptick or by calling the office if you have any side effects.      Please follow this titration schedule:   1 tab daily x 14 days, then 2 tabs daily x14 days, then 4 tabs daily x14 days    Weeks 1 and 2: take 1 tablet daily   Weeks 3 and 4: take 2 tablets daily  Weeks 5 and 6: take 4 tablets daily (2 tablets AM, 2 tablets PM)     - EEG (brainwave study)  - Follow up in 3 months    Dr. LANE's contact information: office phone [phone], or contact via Sp[employer]

## 2023-08-15 ENCOUNTER — OFFICE VISIT (OUTPATIENT)
Dept: ORTHOPEDICS | Facility: CLINIC | Age: 33
End: 2023-08-15
Payer: COMMERCIAL

## 2023-08-15 ENCOUNTER — HOSPITAL ENCOUNTER (OUTPATIENT)
Dept: RADIOLOGY | Facility: OTHER | Age: 33
Discharge: HOME | End: 2023-08-15
Attending: ORTHOPAEDIC SURGERY
Payer: COMMERCIAL

## 2023-08-15 VITALS
BODY MASS INDEX: 39.17 KG/M2 | HEART RATE: 66 BPM | SYSTOLIC BLOOD PRESSURE: 125 MMHG | HEIGHT: 75 IN | WEIGHT: 315 LBS | DIASTOLIC BLOOD PRESSURE: 80 MMHG

## 2023-08-15 DIAGNOSIS — G56.03 BILATERAL CARPAL TUNNEL SYNDROME: Primary | ICD-10-CM

## 2023-08-15 DIAGNOSIS — M79.642 LEFT HAND PAIN: ICD-10-CM

## 2023-08-15 PROCEDURE — 73130 XR HAND COMPLETE 3 VIEW LEFT: ICD-10-PCS | Mod: 26,LT,, | Performed by: RADIOLOGY

## 2023-08-15 PROCEDURE — 99999 PR PBB SHADOW E&M-EST. PATIENT-LVL III: CPT | Mod: PBBFAC,,, | Performed by: ORTHOPAEDIC SURGERY

## 2023-08-15 PROCEDURE — 99999 PR PBB SHADOW E&M-EST. PATIENT-LVL III: ICD-10-PCS | Mod: PBBFAC,,, | Performed by: ORTHOPAEDIC SURGERY

## 2023-08-15 PROCEDURE — 3079F DIAST BP 80-89 MM HG: CPT | Mod: CPTII,S$GLB,, | Performed by: ORTHOPAEDIC SURGERY

## 2023-08-15 PROCEDURE — 1159F MED LIST DOCD IN RCRD: CPT | Mod: CPTII,S$GLB,, | Performed by: ORTHOPAEDIC SURGERY

## 2023-08-15 PROCEDURE — 99214 OFFICE O/P EST MOD 30 MIN: CPT | Mod: S$GLB,,, | Performed by: ORTHOPAEDIC SURGERY

## 2023-08-15 PROCEDURE — 99214 PR OFFICE/OUTPT VISIT, EST, LEVL IV, 30-39 MIN: ICD-10-PCS | Mod: S$GLB,,, | Performed by: ORTHOPAEDIC SURGERY

## 2023-08-15 PROCEDURE — 3044F PR MOST RECENT HEMOGLOBIN A1C LEVEL <7.0%: ICD-10-PCS | Mod: CPTII,S$GLB,, | Performed by: ORTHOPAEDIC SURGERY

## 2023-08-15 PROCEDURE — 73130 X-RAY EXAM OF HAND: CPT | Mod: 26,LT,, | Performed by: RADIOLOGY

## 2023-08-15 PROCEDURE — 1159F PR MEDICATION LIST DOCUMENTED IN MEDICAL RECORD: ICD-10-PCS | Mod: CPTII,S$GLB,, | Performed by: ORTHOPAEDIC SURGERY

## 2023-08-15 PROCEDURE — 3079F PR MOST RECENT DIASTOLIC BLOOD PRESSURE 80-89 MM HG: ICD-10-PCS | Mod: CPTII,S$GLB,, | Performed by: ORTHOPAEDIC SURGERY

## 2023-08-15 PROCEDURE — 3044F HG A1C LEVEL LT 7.0%: CPT | Mod: CPTII,S$GLB,, | Performed by: ORTHOPAEDIC SURGERY

## 2023-08-15 PROCEDURE — 73130 X-RAY EXAM OF HAND: CPT | Mod: TC,FY,LT

## 2023-08-15 PROCEDURE — 3074F PR MOST RECENT SYSTOLIC BLOOD PRESSURE < 130 MM HG: ICD-10-PCS | Mod: CPTII,S$GLB,, | Performed by: ORTHOPAEDIC SURGERY

## 2023-08-15 PROCEDURE — 4010F ACE/ARB THERAPY RXD/TAKEN: CPT | Mod: CPTII,S$GLB,, | Performed by: ORTHOPAEDIC SURGERY

## 2023-08-15 PROCEDURE — 4010F PR ACE/ARB THEARPY RXD/TAKEN: ICD-10-PCS | Mod: CPTII,S$GLB,, | Performed by: ORTHOPAEDIC SURGERY

## 2023-08-15 PROCEDURE — 3008F BODY MASS INDEX DOCD: CPT | Mod: CPTII,S$GLB,, | Performed by: ORTHOPAEDIC SURGERY

## 2023-08-15 PROCEDURE — 3074F SYST BP LT 130 MM HG: CPT | Mod: CPTII,S$GLB,, | Performed by: ORTHOPAEDIC SURGERY

## 2023-08-15 PROCEDURE — 3008F PR BODY MASS INDEX (BMI) DOCUMENTED: ICD-10-PCS | Mod: CPTII,S$GLB,, | Performed by: ORTHOPAEDIC SURGERY

## 2023-08-15 RX ORDER — ROSUVASTATIN CALCIUM 20 MG/1
1 TABLET, COATED ORAL NIGHTLY
COMMUNITY
Start: 2023-01-23 | End: 2023-11-08

## 2023-08-15 RX ORDER — CEPHALEXIN 500 MG/1
CAPSULE ORAL
COMMUNITY
End: 2024-02-07

## 2023-08-15 RX ORDER — OXYCODONE AND ACETAMINOPHEN 5; 325 MG/1; MG/1
TABLET ORAL
COMMUNITY
End: 2024-02-07

## 2023-08-15 RX ORDER — NITROFURANTOIN 25; 75 MG/1; MG/1
CAPSULE ORAL
COMMUNITY
End: 2023-11-08

## 2023-08-15 NOTE — PROGRESS NOTES
Subjective:      Patient ID: Scott Gallagher is a 33 y.o. male.    Chief Complaint: Pain of the Left Wrist      HPI  Scott Gallagher is a right hand dominant 33 y.o. male presenting today for bilateral wrist pain.  There was not a history of trauma. 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  Onset of symptoms began 10 + years.  Pt has a stroke in 2021. Pt occasionally loses fine motor skills associated with the stroke Hx. Pt performed therapy and helped with his recovery. He describes it as dull 5/10 pain, currently a 0 at rest, is localized to the bilateral wrist due to holding his instrument.  Symptoms aggravated by repetitive activity.  Pain does not radiate and is not worse at night. Previous treatments include rest and night splinting, which have provided adequate relief.   .   For the most pRT THE DULL PAIN HE WAS HAVING IS GONE.  wHEN HE WAS PLAYING INSTRE[employer] a lot  ( he was out of practice) did have overuse pain, used a splint which helped. Today it is feeling good       The patient denies any fevers, chills, N/V, D/C and presents for evaluation.      Review of patient's allergies indicates:   Allergen Reactions    Anti-nausea [phosphorated carbohydrate] Hives     Pt unsure of name of Rx; states 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         Current Outpatient Medications   Medication Sig Dispense Refill    amLODIPine (NORVASC) 10 MG tablet Take 1 tablet (10 mg total) by mouth once daily. 90 tablet 3    aspirin 81 MG Chew Take 81 mg by mouth once daily.      cephALEXin (KEFLEX) 500 MG capsule       gabapentin (NEURONTIN) 300 MG capsule Take 1 capsule (300 mg total) by mouth every evening. 30 capsule 11    hydroCHLOROthiazide (HYDRODIURIL) 25 MG tablet Take 1 tablet (25 mg total) by mouth once daily. 30 tablet 11    lamoTRIgine (LAMICTAL) 25 MG tablet 1 tab daily x 14 days, then 2 tabs daily x14 days, then 4  "tabs daily x14 days 98 tablet 0    LIDOcaine (LIDODERM) 5 % Place 1 patch onto the skin once daily. Remove & Discard patch within 12 hours or as directed by MD 12 patch 0    nitrofurantoin, macrocrystal-monohydrate, (MACROBID) 100 MG capsule TAKE ONE CAPSULE BY MOUTH EVERY 12 HOURS FOR 5 DAYS      olmesartan (BENICAR) 40 MG tablet Take 1 tablet (40 mg total) by mouth once daily. 90 tablet 3    olmesartan-amLODIPin-hcthiazid 40-10-12.5 mg Tab Take 1 tablet by mouth once daily. 90 tablet 3    oxyCODONE-acetaminophen (PERCOCET) 5-325 mg per tablet TAKE ONE TABLET BY MOUTH EVERY 6 HOURS FOR 3 DAYS AS NEEDED FOR MODERATE PAIN      propranoloL (INDERAL) 10 MG tablet Take 1 tablet (10 mg total) by mouth 3 (three) times daily as needed (for tremor). 90 tablet 3    rosuvastatin (CRESTOR) 20 MG tablet Take 1 tablet by mouth every evening.      ibuprofen (ADVIL,MOTRIN) 200 MG tablet Take 200 mg by mouth every 6 (six) hours as needed for Pain.      methocarbamoL (ROBAXIN) 750 MG Tab Take 2 tablets (1,500 mg total) by mouth every 8 (eight) hours as needed. 16 tablet 0     No current facility-administered medications for this visit.       Past Medical History:   Diagnosis Date    Hypertension        Past Surgical History:   Procedure Laterality Date    APPENDECTOMY  2004    EPIDURAL STEROID INJECTION N/A 7/21/2023    Procedure: L4-5 LESI (toward the left);  Surgeon: Dom Jha DO;  Location: [facility];  Service: Pain Management;  Laterality: N/A;  oral sedation    WISDOM TOOTH EXTRACTION  2011       Review of Systems:  Constitutional: Negative for chills and fever.   Respiratory: Negative for cough and shortness of breath.    Gastrointestinal: Negative for nausea and vomiting.   Skin: Negative for rash.   Neurological: Negative for dizziness and headaches.   Psychiatric/Behavioral: Negative for depression.   MSK as in HPI       OBJECTIVE:     PHYSICAL EXAM:  /80   Pulse 66   Ht 6' 3" (1.905 m)   Wt " (!) 142.9 kg (315 lb)   BMI 39.37 kg/m²     GEN:  NAD, well-developed, well-groomed.  NEURO: Awake, alert, and oriented. Normal attention and concentration.    PSYCH: Normal mood and affect. Behavior is normal.  HEENT: No cervical lymphadenopathy noted.  CARDIOVASCULAR: Radial pulses 2+ bilaterally. No LE edema noted.  PULMONARY: Breath sounds normal. No respiratory distress.  SKIN: Intact, no rashes.      MSK:     RUE:  Good active ROM of the wrist and fingers. AIN/PIN/Radial/Median/Ulnar Nerves assessed in isolation without deficit. Radial & Ulnar arteries palpated 2+. Capillary Refill <3s.    LUE:  Good active ROM of the wrist and fingers. AIN/PIN/Radial/Median/Ulnar Nerves assessed in isolation without deficit. Radial & Ulnar arteries palpated 2+. Capillary Refill <3s. Positive Tinels at wrist and elbow.      RADIOGRAPHS:  Bilateral Hand 08/15/23  FINDINGS:  The alignment is within normal limits.  No fracture.  No marrow replacing process.     Impression:     No osseous abnormality.  Comments: I have personally reviewed the imaging and I agree with the above radiologist's report.    EMG 09/23/23  Impression   This is an abnormal study. There is electrophysiologic evidence of:   1. Mild left carpal tunnel syndrome (median neuropathy at the wrist) based upon the abnormal transcarpal comparison evaluation. The left median antidromic sensory and antidromic motor nerve evaluations are within normal limits     ASSESSMENT/PLAN:   No diagnosis found.       No orders of the defined types were placed in this encounter.       Plan:     Educated pt on CT and surgery for it as well as conservative management. Pt will let us know if symptoms increase.      The patient indicates understanding of these issues and agrees to the plan.        This note has been scribed in part by Destiney Beyer, ATC/L, OTC, my Sports Medicine Assistant (SMA). This SMA performed & documented a complete history pre-assessment including the  history of present illness, which I, Kristan Rodriguez MD, explored & confirmed personally with the patient. The [facility] has scribed portions of this note including my physical exanimation, diagnostic imaging interpretation, procedures performed, my plan of care & diagnosis. I agree that the scribed documentation is accurate & complete.

## 2023-08-31 RX ORDER — LAMOTRIGINE 50 MG/1
50 TABLET, ORALLY DISINTEGRATING ORAL 2 TIMES DAILY
Qty: 180 TABLET | Refills: 3 | Status: SHIPPED | OUTPATIENT
Start: 2023-08-31 | End: 2023-09-15

## 2023-08-31 RX ORDER — LAMOTRIGINE 50 MG/1
50 TABLET, ORALLY DISINTEGRATING ORAL 2 TIMES DAILY
Qty: 180 TABLET | Refills: 3 | Status: CANCELLED | OUTPATIENT
Start: 2023-08-31 | End: 2024-08-30

## 2023-09-07 ENCOUNTER — PATIENT MESSAGE (OUTPATIENT)
Dept: INTERNAL MEDICINE | Facility: CLINIC | Age: 33
End: 2023-09-07
Payer: COMMERCIAL

## 2023-09-07 DIAGNOSIS — G25.2 INTENTION TREMOR: ICD-10-CM

## 2023-09-07 DIAGNOSIS — R10.9 ABDOMINAL PAIN, UNSPECIFIED ABDOMINAL LOCATION: Primary | ICD-10-CM

## 2023-09-07 RX ORDER — PROPRANOLOL HYDROCHLORIDE 10 MG/1
10 TABLET ORAL 3 TIMES DAILY PRN
Qty: 90 TABLET | Refills: 1 | Status: SHIPPED | OUTPATIENT
Start: 2023-09-07 | End: 2024-02-19

## 2023-09-07 NOTE — TELEPHONE ENCOUNTER
No care due was identified.  [facility] Embedded Care Due Messages. Reference number: [id number].   9/07/2023 2:22:13 PM CDT

## 2023-09-07 NOTE — TELEPHONE ENCOUNTER
Refill Decision Note   Scott Eleonoramorro  is requesting a refill authorization.  Brief Assessment and Rationale for Refill:  Approve     Medication Therapy Plan:  ED visit unrelated to requested rx    Medication Reconciliation Completed: No   Comments:     No Care Gaps recommended.     Note composed:6:38 PM 09/07/2023

## 2023-09-15 ENCOUNTER — TELEPHONE (OUTPATIENT)
Dept: NEUROLOGY | Facility: CLINIC | Age: 33
End: 2023-09-15
Payer: COMMERCIAL

## 2023-09-15 NOTE — TELEPHONE ENCOUNTER
----- Message from Carolyn Funez MA sent at 9/14/2023  1:32 PM CDT -----  Regarding: FW: Clarity  Contact: [place] [phone]    ----- Message -----  From: Rosalva Chand  Sent: 9/14/2023   1:28 PM CDT  To: Michelle Bruce Staff  Subject: Huang Townsend with [facility] is calling in ref to pt's medication lamoTRIgine 50 mg TbDL. Cary says pt is asking about change in strength and type of tablet. Cary is asking for clarity. If there is a change she will have to order and wants to confirm it's the right thing. Pharmacy will be closed from 130-2. Best Call Back Number: [place] [phone]

## 2023-09-15 NOTE — TELEPHONE ENCOUNTER
----- Message from Carolyn Funez MA sent at 9/14/2023  1:32 PM CDT -----  Regarding: FW: Clarity  Contact: [place] [phone]    ----- Message -----  From: Rosalva Chand  Sent: 9/14/2023   1:28 PM CDT  To: Michelle Bruce Staff  Subject: Huang Townsend with [facility] is calling in ref to pt's medication lamoTRIgine 50 mg TbDL. Cary says pt is asking about change in strength and type of tablet. Cary is asking for clarity. If there is a change she will have to order and wants to confirm it's the right thing. Pharmacy will be closed from 130-2. Best Call Back Number: [place] [phone]

## 2023-09-29 ENCOUNTER — DOCUMENTATION ONLY (OUTPATIENT)
Dept: REHABILITATION | Facility: HOSPITAL | Age: 33
End: 2023-09-29
Payer: COMMERCIAL

## 2023-09-29 NOTE — PROGRESS NOTES
9/29/2023    Pt has not attended PT sessions since 6/27/2023 and will be discharged from PT services with goal status unknown.

## 2023-11-03 ENCOUNTER — TELEPHONE (OUTPATIENT)
Dept: ENDOSCOPY | Facility: HOSPITAL | Age: 33
End: 2023-11-03
Payer: COMMERCIAL

## 2023-11-06 ENCOUNTER — PATIENT MESSAGE (OUTPATIENT)
Dept: INTERNAL MEDICINE | Facility: CLINIC | Age: 33
End: 2023-11-06
Payer: COMMERCIAL

## 2023-11-08 ENCOUNTER — OFFICE VISIT (OUTPATIENT)
Dept: INTERNAL MEDICINE | Facility: CLINIC | Age: 33
End: 2023-11-08
Attending: FAMILY MEDICINE
Payer: COMMERCIAL

## 2023-11-08 DIAGNOSIS — I10 HYPERTENSION, ESSENTIAL: ICD-10-CM

## 2023-11-08 DIAGNOSIS — M79.671 ACUTE FOOT PAIN, RIGHT: Primary | ICD-10-CM

## 2023-11-08 PROCEDURE — 99214 PR OFFICE/OUTPT VISIT, EST, LEVL IV, 30-39 MIN: ICD-10-PCS | Mod: 95,,, | Performed by: FAMILY MEDICINE

## 2023-11-08 PROCEDURE — 1159F PR MEDICATION LIST DOCUMENTED IN MEDICAL RECORD: ICD-10-PCS | Mod: CPTII,95,, | Performed by: FAMILY MEDICINE

## 2023-11-08 PROCEDURE — 3044F PR MOST RECENT HEMOGLOBIN A1C LEVEL <7.0%: ICD-10-PCS | Mod: CPTII,95,, | Performed by: FAMILY MEDICINE

## 2023-11-08 PROCEDURE — 1159F MED LIST DOCD IN RCRD: CPT | Mod: CPTII,95,, | Performed by: FAMILY MEDICINE

## 2023-11-08 PROCEDURE — 1160F PR REVIEW ALL MEDS BY PRESCRIBER/CLIN PHARMACIST DOCUMENTED: ICD-10-PCS | Mod: CPTII,95,, | Performed by: FAMILY MEDICINE

## 2023-11-08 PROCEDURE — 3044F HG A1C LEVEL LT 7.0%: CPT | Mod: CPTII,95,, | Performed by: FAMILY MEDICINE

## 2023-11-08 PROCEDURE — 4010F PR ACE/ARB THEARPY RXD/TAKEN: ICD-10-PCS | Mod: CPTII,95,, | Performed by: FAMILY MEDICINE

## 2023-11-08 PROCEDURE — 4010F ACE/ARB THERAPY RXD/TAKEN: CPT | Mod: CPTII,95,, | Performed by: FAMILY MEDICINE

## 2023-11-08 PROCEDURE — 1160F RVW MEDS BY RX/DR IN RCRD: CPT | Mod: CPTII,95,, | Performed by: FAMILY MEDICINE

## 2023-11-08 PROCEDURE — 99214 OFFICE O/P EST MOD 30 MIN: CPT | Mod: 95,,, | Performed by: FAMILY MEDICINE

## 2023-11-08 RX ORDER — NABUMETONE 500 MG/1
500 TABLET, FILM COATED ORAL 2 TIMES DAILY
Qty: 60 TABLET | Refills: 3 | Status: SHIPPED | OUTPATIENT
Start: 2023-11-08 | End: 2023-12-08

## 2023-11-08 RX ORDER — OLMESARTAN MEDOXOMIL, AMLODIPINE AND HYDROCHLOROTHIAZIDE TABLET 40/10/25 MG 40; 10; 25 MG/1; MG/1; MG/1
1 TABLET ORAL DAILY
COMMUNITY
End: 2024-02-07

## 2023-11-08 RX ORDER — LAMOTRIGINE 100 MG/1
100 TABLET ORAL DAILY
COMMUNITY
Start: 2023-11-02 | End: 2024-03-27

## 2023-11-08 NOTE — PROGRESS NOTES
The patient location is:  Home  The chief complaint leading to consultation is:  Foot pain    Visit type: audiovisual    Face to Face time with patient:  10 minutes  Fifteen minutes of total time spent on the encounter, which includes face to face time and non-face to face time preparing to see the patient (eg, review of tests), Obtaining and/or reviewing separately obtained history, Documenting clinical information in the electronic or other health record, Independently interpreting results (not separately reported) and communicating results to the patient/family/caregiver, or Care coordination (not separately reported).         Each patient to whom he or she provides medical services by telemedicine is:  (1) informed of the relationship between the physician and patient and the respective role of any other health care provider with respect to management of the patient; and (2) notified that he or she may decline to receive medical services by telemedicine and may withdraw from such care at any time.    Notes:   CHIEF COMPLAINT:  Right foot pain    HISTORY OF PRESENT ILLNESS: The patient is a 33 year-old WM.  He has had the recent onset of atraumatic right foot pain.  Pain follows the tendon path proximally from the great toe.    The patient is Healthy except ruptured aneurysm 2021 d/t HTN.    He also has Left sciatica for several months.    He will need to see CRS for significant hemorrhoids.    ED responds nicely to viagra prn    REVIEW OF SYSTEMS:  GENERAL: No fever, chills, fatigability or weight loss.  SKIN: No rashes, itching or changes in color or texture of skin.  HEAD: No headaches or recent head trauma.  EYES: Visual acuity fine. No photophobia, ocular pain or diplopia.  EARS: Denies ear pain, discharge or vertigo.  NOSE: No loss of smell, no epistaxis or postnasal drip.  MOUTH & THROAT: No hoarseness or change in voice. No excessive gum bleeding.  NODES: Denies swollen glands.  CHEST: Denies CUMMINGS,  cyanosis, wheezing, cough and sputum production.  CARDIOVASCULAR: Denies chest pain, PND, orthopnea or reduced exercise tolerance.  ABDOMEN: Appetite fine. No weight loss. Denies diarrhea, abdominal pain, hematemesis.  URINARY: No flank pain, dysuria or hematuria.  PERIPHERAL VASCULAR: No claudication or cyanosis.  MUSCULOSKELETAL: No joint stiffness or swelling.  Except as above  NEUROLOGIC: No history of seizures, paralysis, alteration of gait or coordination.    SOCIAL HISTORY: The patient does not smoke.  The patient consumes alcohol socially.  The patient is single.    PHYSICAL EXAMINATION:   There were no vitals taken for this visit.  APPEARANCE: Well nourished, well developed, in no acute distress.    HEAD: Normocephalic, atraumatic.  NEUROLOGIC:       Normal speech development.      Hearing normal.  PSYCHIATRIC: Patient is alert and oriented x3.  Thought processes are all normal.  There is no homicidality.  There is no suicidality.  There is no evidence of psychosis.    LABORATORY/RADIOLOGY:   Chart reviewed.  We will update blood work today.    ASSESSMENT:   Right foot tendonitis  ruptured aneurysm 2021 d/t HTN  Left sciatica for several months  CRS   ED     PLAN:  [facility]  Neurosurgery   Orthopedics  CRS  viagra prn  Return to clinic in one year.          Answers submitted by the patient for this visit:  Review of Systems Questionnaire (Submitted on 11/8/2023)  activity change: Yes  unexpected weight change: No  neck pain: No  hearing loss: No  rhinorrhea: No  trouble swallowing: No  eye discharge: No  visual disturbance: No  chest tightness: No  wheezing: No  chest pain: No  palpitations: No  blood in stool: No  constipation: No  vomiting: No  diarrhea: No  polydipsia: No  polyuria: No  difficulty urinating: No  urgency: No  hematuria: No  joint swelling: No  arthralgias: No  headaches: No  weakness: No  confusion: No  dysphoric mood: No

## 2023-11-24 ENCOUNTER — TELEPHONE (OUTPATIENT)
Dept: PODIATRY | Facility: CLINIC | Age: 33
End: 2023-11-24
Payer: COMMERCIAL

## 2023-11-24 ENCOUNTER — OFFICE VISIT (OUTPATIENT)
Dept: PODIATRY | Facility: CLINIC | Age: 33
End: 2023-11-24
Attending: FAMILY MEDICINE
Payer: COMMERCIAL

## 2023-11-24 VITALS
BODY MASS INDEX: 39.17 KG/M2 | WEIGHT: 315 LBS | SYSTOLIC BLOOD PRESSURE: 124 MMHG | DIASTOLIC BLOOD PRESSURE: 79 MMHG | HEART RATE: 77 BPM | HEIGHT: 75 IN

## 2023-11-24 DIAGNOSIS — M79.671 ACUTE FOOT PAIN, RIGHT: ICD-10-CM

## 2023-11-24 PROCEDURE — 99999 PR PBB SHADOW E&M-EST. PATIENT-LVL IV: CPT | Mod: PBBFAC,,, | Performed by: PODIATRIST

## 2023-11-24 PROCEDURE — 4010F ACE/ARB THERAPY RXD/TAKEN: CPT | Mod: CPTII,S$GLB,, | Performed by: PODIATRIST

## 2023-11-24 PROCEDURE — 3074F SYST BP LT 130 MM HG: CPT | Mod: CPTII,S$GLB,, | Performed by: PODIATRIST

## 2023-11-24 PROCEDURE — 4010F PR ACE/ARB THEARPY RXD/TAKEN: ICD-10-PCS | Mod: CPTII,S$GLB,, | Performed by: PODIATRIST

## 2023-11-24 PROCEDURE — 1159F MED LIST DOCD IN RCRD: CPT | Mod: CPTII,S$GLB,, | Performed by: PODIATRIST

## 2023-11-24 PROCEDURE — 99203 OFFICE O/P NEW LOW 30 MIN: CPT | Mod: S$GLB,,, | Performed by: PODIATRIST

## 2023-11-24 PROCEDURE — 1159F PR MEDICATION LIST DOCUMENTED IN MEDICAL RECORD: ICD-10-PCS | Mod: CPTII,S$GLB,, | Performed by: PODIATRIST

## 2023-11-24 PROCEDURE — 1160F RVW MEDS BY RX/DR IN RCRD: CPT | Mod: CPTII,S$GLB,, | Performed by: PODIATRIST

## 2023-11-24 PROCEDURE — 3008F PR BODY MASS INDEX (BMI) DOCUMENTED: ICD-10-PCS | Mod: CPTII,S$GLB,, | Performed by: PODIATRIST

## 2023-11-24 PROCEDURE — 3008F BODY MASS INDEX DOCD: CPT | Mod: CPTII,S$GLB,, | Performed by: PODIATRIST

## 2023-11-24 PROCEDURE — 99999 PR PBB SHADOW E&M-EST. PATIENT-LVL IV: ICD-10-PCS | Mod: PBBFAC,,, | Performed by: PODIATRIST

## 2023-11-24 PROCEDURE — 3078F DIAST BP <80 MM HG: CPT | Mod: CPTII,S$GLB,, | Performed by: PODIATRIST

## 2023-11-24 PROCEDURE — 3074F PR MOST RECENT SYSTOLIC BLOOD PRESSURE < 130 MM HG: ICD-10-PCS | Mod: CPTII,S$GLB,, | Performed by: PODIATRIST

## 2023-11-24 PROCEDURE — 3078F PR MOST RECENT DIASTOLIC BLOOD PRESSURE < 80 MM HG: ICD-10-PCS | Mod: CPTII,S$GLB,, | Performed by: PODIATRIST

## 2023-11-24 PROCEDURE — 3044F PR MOST RECENT HEMOGLOBIN A1C LEVEL <7.0%: ICD-10-PCS | Mod: CPTII,S$GLB,, | Performed by: PODIATRIST

## 2023-11-24 PROCEDURE — 3044F HG A1C LEVEL LT 7.0%: CPT | Mod: CPTII,S$GLB,, | Performed by: PODIATRIST

## 2023-11-24 PROCEDURE — 1160F PR REVIEW ALL MEDS BY PRESCRIBER/CLIN PHARMACIST DOCUMENTED: ICD-10-PCS | Mod: CPTII,S$GLB,, | Performed by: PODIATRIST

## 2023-11-24 PROCEDURE — 99203 PR OFFICE/OUTPT VISIT, NEW, LEVL III, 30-44 MIN: ICD-10-PCS | Mod: S$GLB,,, | Performed by: PODIATRIST

## 2023-11-24 NOTE — PROGRESS NOTES
Subjective:      Patient ID: Scott Gallagher is a 33 y.o. male.    Chief Complaint: Foot Pain (R foot)    Sharp intense pain the top of the right foot in the area the big toe joint extending proximally.  Rapid onset overnight with gradual progression over the next 2 weeks to the point he was unable to walk.  Aggravated with touch pressure shoes.  Prior medical treatment with doctor warmth with anti-inflammatory as nearly resolve the issue entirely.  Patient denies recent significant trauma surgery both feet    Review of Systems   Constitutional: Negative for chills, diaphoresis, fever, malaise/fatigue and night sweats.   Cardiovascular:  Negative for claudication, cyanosis, leg swelling and syncope.   Skin:  Negative for color change, dry skin, nail changes, rash, suspicious lesions and unusual hair distribution.   Musculoskeletal:  Negative for falls, joint pain, joint swelling, muscle cramps, muscle weakness and stiffness.   Gastrointestinal:  Negative for constipation, diarrhea, nausea and vomiting.   Neurological:  Negative for brief paralysis, disturbances in coordination, focal weakness, numbness, paresthesias, sensory change and tremors.         Objective:      Physical Exam  Constitutional:       General: He is not in acute distress.     Appearance: He is well-developed. He is not diaphoretic.   Cardiovascular:      Pulses:           Popliteal pulses are 2+ on the right side and 2+ on the left side.        Dorsalis pedis pulses are 2+ on the right side and 2+ on the left side.        Posterior tibial pulses are 2+ on the right side and 2+ on the left side.      Comments: Capillary refill 3 seconds all toes/distal feet, all toes/both feet warm to touch.      Negative lymphadenopathy bilateral popliteal fossa and tarsal tunnel.      Negavie lower extremity edema bilateral.    Musculoskeletal:      Right ankle: No swelling, deformity, ecchymosis or lacerations. Normal range of motion. Normal pulse.      Right  Achilles Tendon: Normal. No defects. Zhu's test negative.      Comments: Minimal current tenderness to palpation of the dorsum lateral aspect of the right 1st MTPJ with no pain to range of motion distraction loading or weight-bearing in gait.    Otherwise, Normal angle, base, station of gait. All ten toes without clubbing, cyanosis, or signs of ischemia.  No pain to palpation bilateral lower extremities.  Range of motion, stability, muscle strength, and muscle tone normal bilateral feet and legs.    Lymphadenopathy:      Lower Body: No right inguinal adenopathy. No left inguinal adenopathy.      Comments: Negative lymphadenopathy bilateral popliteal fossa and tarsal tunnel.    Negative lymphangitic streaking bilateral feet/ankles/legs.   Skin:     General: Skin is warm and dry.      Capillary Refill: Capillary refill takes 2 to 3 seconds.      Coloration: Skin is not pale.      Findings: No abrasion, bruising, burn, ecchymosis, erythema, laceration, lesion or rash.      Nails: There is no clubbing.      Comments:   Skin is normal age and health appropriate color, turgor, texture, and temperature bilateral lower extremities without ulceration, hyperpigmentation, discoloration, masses nodules or cords palpated.  No ecchymosis, erythema, edema, or cardinal signs of infection bilateral lower extremities.     Neurological:      Mental Status: He is alert and oriented to person, place, and time.      Sensory: No sensory deficit.      Motor: No tremor, atrophy or abnormal muscle tone.      Gait: Gait normal.      Comments: Negative tinel sign to percussion sural, superficial peroneal, deep peroneal, saphenous, and posterior tibial nerves right and left ankles and feet.     Psychiatric:         Behavior: Behavior is cooperative.           Assessment:       Encounter Diagnosis   Name Primary?    Acute foot pain, right          Plan:       Scott was seen today for foot pain.    Diagnoses and all orders for this  visit:    Acute foot pain, right  -     Ambulatory referral/consult to Podiatry      I counseled the patient on his conditions, their implications and medical management.        Patient will stretch the tendo achilles complex three times daily as demonstrated in the office.  Literature was dispensed illustrating proper stretching technique.    Patient will obtain over the counter arch supports and wear them in shoes whenever possible.  Athletic shoes intended for walking or running are usually best.    Activity to tolerance and shoes of choice.      We discussed patient's symptom complex and resolution were most consistent with a likely gouty attack.      Simple prevention for this good balance diet and adequate hydration of up to 4 L of water daily.    P.r.n.          Follow up if symptoms worsen or fail to improve.

## 2023-11-24 NOTE — TELEPHONE ENCOUNTER
Left message to inform patient Dr. Romero will discuss with him if X-ray is needed at appointment.        ----- Message from Jarek Morton sent at 11/24/2023 12:00 PM CST -----  Regarding: XRay  Name of Who is Calling:  Patient          What is the request in detail:  Please contact patient he would like to know if he should he an Xray            Can the clinic reply by [facility]: Yes            [facility]: [phone]

## 2024-01-02 ENCOUNTER — OFFICE VISIT (OUTPATIENT)
Dept: OPTOMETRY | Facility: CLINIC | Age: 34
End: 2024-01-02
Payer: COMMERCIAL

## 2024-01-02 DIAGNOSIS — H52.13 MYOPIA OF BOTH EYES WITH ASTIGMATISM: Primary | ICD-10-CM

## 2024-01-02 DIAGNOSIS — H52.203 MYOPIA OF BOTH EYES WITH ASTIGMATISM: Primary | ICD-10-CM

## 2024-01-02 PROCEDURE — 92015 DETERMINE REFRACTIVE STATE: CPT | Mod: S$GLB,,, | Performed by: OPTOMETRIST

## 2024-01-02 PROCEDURE — 92012 INTRM OPH EXAM EST PATIENT: CPT | Mod: S$GLB,,, | Performed by: OPTOMETRIST

## 2024-01-02 PROCEDURE — 99999 PR PBB SHADOW E&M-EST. PATIENT-LVL III: CPT | Mod: PBBFAC,,, | Performed by: OPTOMETRIST

## 2024-01-02 NOTE — PROGRESS NOTES
HPI     Blurred Vision    In left eye.  Onset was gradual.  Vision is blurred.  Severity is mild.    This started months ago.  Occurring constantly.  It is worse throughout   the day.  Since onset it is stable.  Treatments tried include no   treatments.           Comments    33 y.o old male is here for a routine eye exam. Squints sometime to see   with left eye when he gets tired. No pain or discomfort, no flashes of   light and floaters.   No gtts  Saw Remy over summer for migraine, no changes at this time          Last edited by Sergio Robles, OD on 1/2/2024  9:45 AM.            Assessment /Plan     For exam results, see Encounter Report.    Myopia of both eyes with astigmatism      New Spectacle Rx given, discussed different options for glasses. RTC 1 year routine eye exam.

## 2024-01-05 ENCOUNTER — PATIENT MESSAGE (OUTPATIENT)
Dept: NEUROLOGY | Facility: CLINIC | Age: 34
End: 2024-01-05
Payer: COMMERCIAL

## 2024-01-07 ENCOUNTER — PATIENT MESSAGE (OUTPATIENT)
Dept: INTERNAL MEDICINE | Facility: CLINIC | Age: 34
End: 2024-01-07
Payer: COMMERCIAL

## 2024-01-22 ENCOUNTER — OFFICE VISIT (OUTPATIENT)
Dept: NEUROLOGY | Facility: CLINIC | Age: 34
End: 2024-01-22
Payer: COMMERCIAL

## 2024-01-22 DIAGNOSIS — G43.109 MIGRAINE AURA WITHOUT HEADACHE: ICD-10-CM

## 2024-01-22 DIAGNOSIS — I61.0 BASAL GANGLIA HEMORRHAGE: ICD-10-CM

## 2024-01-22 DIAGNOSIS — I77.71 DISSECTION OF LEFT CAROTID ARTERY: ICD-10-CM

## 2024-01-22 DIAGNOSIS — F41.9 ANXIETY: Primary | ICD-10-CM

## 2024-01-22 DIAGNOSIS — I10 HYPERTENSION, UNSPECIFIED TYPE: ICD-10-CM

## 2024-01-22 PROCEDURE — 4010F ACE/ARB THERAPY RXD/TAKEN: CPT | Mod: CPTII,S$GLB,, | Performed by: NURSE PRACTITIONER

## 2024-01-22 PROCEDURE — 99999 PR PBB SHADOW E&M-EST. PATIENT-LVL III: CPT | Mod: PBBFAC,,, | Performed by: NURSE PRACTITIONER

## 2024-01-22 PROCEDURE — 1159F MED LIST DOCD IN RCRD: CPT | Mod: CPTII,S$GLB,, | Performed by: NURSE PRACTITIONER

## 2024-01-22 PROCEDURE — 1160F RVW MEDS BY RX/DR IN RCRD: CPT | Mod: CPTII,S$GLB,, | Performed by: NURSE PRACTITIONER

## 2024-01-22 PROCEDURE — 99215 OFFICE O/P EST HI 40 MIN: CPT | Mod: S$GLB,,, | Performed by: NURSE PRACTITIONER

## 2024-01-22 NOTE — PROGRESS NOTES
[facility] VISIT      SUBJECTIVE:    History for Present Illness: Scott Gallagher is a 33 y.o.  male  with PMHx of right basal ganglia hemorrhage (May 2021 etiology presumed uncontrolled hypertension) migraine, and HTN who presents to me today for follow-up.  He is accompanied to today's visit by his significant other    Relevant HPI:  Patient presented to [facility] ED on May 2021 at which time he was found to have a right basal ganglia hemorrhage etiology presumed to be secondary to uncontrolled hypertension; patient additionally reports during the same hospitalization he was noted to have a carotid dissection on the left.    He was initially/last seen in clinic by me on 04/26/2023.  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  First incident was in 2022 most recent incident was March 2023.Patient's partner reports word-finding difficulty was mild and nondetectable to outsiders. Symptoms self-resolved without any recurrence. Patient reports an aura and right visual field >1 year    Interval history:    Scott is doing well overall  .  Since our last visit the patient was seen by Neurology Dr. Bruce who started him on Lamotrigine .  Patient reports recurrent episode of difficulty with word finding and decreased dexterity in the 3rd and 4th digit of the left hand.  Symptoms were limited and self-resolved without recurrence.  Patient reports he is able to text with fluent language during these episodes.  The patient and significant other report environmental stressors leading up to the event similar to prior events in 2023.  At the time of exam,He denies associated vertigo, double vision, focal weakness or numbness, gait imbalance,  language or visual disturbances.  Chronic back pain (noncontributory to today's  Patient brought back from CT via stretcher at this time. complaint).  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  Patient remains independent with ADLs.  He denies alcohol/tobacco/illicit drug use.  Patient is adherent with home medications.  .      Past Medical History:   Diagnosis Date    Hypertension      Past Surgical History:   Procedure Laterality Date    APPENDECTOMY  2004    EPIDURAL STEROID INJECTION N/A 7/21/2023    Procedure: L4-5 LESI (toward the left);  Surgeon: Dom Jha DO;  Location: [facility];  Service: Pain Management;  Laterality: N/A;  oral sedation    WISDOM TOOTH EXTRACTION  2011     Family History   Problem Relation Age of Onset    Hypertension Mother     Diabetes type II Mother     Hypertension Father     Diabetes type II Father     Hypertension Sister 28    Hypertension Brother     Hypertension Brother     Hypertension Brother     Hypertension Sister         Current Outpatient Medications:     amLODIPine (NORVASC) 10 MG tablet, Take 1 tablet (10 mg total) by mouth once daily., Disp: 90 tablet, Rfl: 3    aspirin 81 MG Chew, Take 81 mg by mouth once daily., Disp: , Rfl:     cephALEXin (KEFLEX) 500 MG capsule, , Disp: , Rfl:     gabapentin (NEURONTIN) 300 MG capsule, Take 1 capsule (300 mg total) by mouth every evening., Disp: 30 capsule, Rfl: 11    hydroCHLOROthiazide (HYDRODIURIL) 25 MG tablet, Take 1 tablet (25 mg total) by mouth once daily., Disp: 30 tablet, Rfl: 11    ibuprofen (ADVIL,MOTRIN) 200 MG tablet, Take 200 mg by mouth every 6 (six) hours as needed for Pain., Disp: , Rfl:     lamoTRIgine (LAMICTAL) 100 MG tablet, Take 100 mg by mouth once daily., Disp: , Rfl:     LIDOcaine (LIDODERM) 5 %, Place 1 patch onto the skin once daily. Remove & Discard patch within 12 hours or as directed by MD (Patient not taking: Reported on 11/8/2023), Disp: 12 patch, Rfl: 0    methocarbamoL (ROBAXIN) 750 MG Tab, Take 2 tablets (1,500 mg total) by mouth every 8 (eight)  hours as needed. (Patient not taking: Reported on 11/8/2023), Disp: 16 tablet, Rfl: 0    olmesartan (BENICAR) 40 MG tablet, Take 1 tablet (40 mg total) by mouth once daily., Disp: 90 tablet, Rfl: 3    olmesartan-amLODIPin-hcthiazid 40-10-25 mg Tab, Take 1 tablet by mouth once daily., Disp: , Rfl:     oxyCODONE-acetaminophen (PERCOCET) 5-325 mg per tablet, TAKE ONE TABLET BY MOUTH EVERY 6 HOURS FOR 3 DAYS AS NEEDED FOR MODERATE PAIN, Disp: , Rfl:     propranoloL (INDERAL) 10 MG tablet, Take 1 tablet (10 mg total) by mouth 3 (three) times daily as needed (for tremor)., Disp: 90 tablet, Rfl: 1     Review of Systems:  Review of systems is negative except for the symptoms mentioned in HPI    Physical exam:    There were no vitals taken for this visit.    General: Well-developed, well-groomed. No apparent distress  HENT: Normocephalic, atraumatic.    Cardiovascular: Regular rate and rhythm  Chest: No audible wheezes, stridor, ronchi appreciated.  Musculoskeletal: No peripheral edema     Neurologic Exam: The patient is awake, alert and oriented to person, place, time and situation. Attentive, vigilant during exam. Language is fluent. Naming & repetition intact, +2-step commands.  Fund of knowledge is appropriate. Well organized thoughts.     Cranial nerves:   CN II: Visual fields are full to confrontation. Pupils are 4 mm and briskly reactive to light.  CN III, IV, VI: EOMI, no nystagmus, no ptosis  CN V: Facial sensation is intact in all 3 divisions bilaterally.  CN VII: Face is symmetric with normal eye closure and smile.  CN VIII: Hearing is normal bilaterally  CN IX, X: Palate elevates symmetrically. Phonation is normal.  CN XI: Head turning and shoulder shrug are intact  CN XII: Tongue is midline with normal movements and no atrophy.     Motor examination of all extremities demonstrates normal bulk and tone in all four limbs. There are no atrophy or fasciculations.        Left Right     Left Right   Deltoid 5/5 5/5    Hip Flexion 5/5 5/5   Biceps 5/5 5/5   Hip Extension 5/5 5/5   Triceps 5/5 5/5   Knee Flexion 5/5 5/5   Wrist Ext 5/5 5/5   Knee Extension 5/5 5/5   Finger Abd 5/5 5/5   Ankle dorsiflex 5/5 5/5           Ankle plantar flex 5/5 5/5     Sensory examination is normal light touch in BUE and BLE.  Romberg is negative.  Deep tendon reflexes No clonus.      Gait: Normal tandem, and casual gait.     Coordination: No dysmetria with finger-to-nose. Rapid alternating movements and fine finger movements are intact.         Relevant Labwork:  Recent Labs   Lab 02/17/23  0945   Hemoglobin A1C 5.5   LDL Cholesterol 145.4   HDL 30 L   Triglycerides 318 H   Cholesterol 239 H       Diagnostic Results:  Imaging was independently reviewed by myself, along with the associated radiology report    Brain Imaging   MRI of the brain 04/18/2023:  Remote right basal ganglia hemorrhage.    No acute intracranial abnormalities  Vessel Imaging   CTA head and neck 05/03/2023:  Remote right basal ganglia hemorrhage.    No acute intracranial hemorrhage or acute major vascular territory infarct.    Chronic appearing left distal cervical ICA dissection with fusiform aneurysmal dilation and small dissection flap.    No high-grade stenosis, large vessel occlusion or intracranial saccular aneurysm  Cardiac Imaging     Assessment:  1. Anxiety  Ambulatory referral/consult to Psychiatry      2. Basal ganglia hemorrhage        3. Dissection of left carotid artery        4. Migraine aura without headache        5. Hypertension, unspecified type          Scott Gallagher  is a 33 y.o.  male  seen today in clinic for follow-up assessment and recommendations. The following recommendations and plan were discussed in depth with the patient who voiced understanding and was in agreement.  Plan:  Nontraumatic right basal ganglia hemorrhage  -Stroke etiology suspected HTN versus vascular abnormality  -continue aggressive risk factor modification and maximum medical  management  -- Antithrombotics/ Anticoagulation:  Not clinically indicated  - Stroke Risk Factors:  HTN  - Lipid Management:  Not clinically indicated  -Hypertension: Long term goal is normotension w/ target BP of less than 130/80 mmHg.  Continue home medications and follow up with PCP for surveillance and long-term management  - Rehab efforts:  None  - Diagnostics ordered/pending:  None  -Diet: Discussed Mediterranean Diet recommendations (Adopted from Abe et al, [facility], 2018.)  - Eat primarily plant-based foods, such as fruits and vegetables, whole grains, legumes (beans) and nuts  - Limit refined carbohydrates (white pasta, bread, rice).  - Replace butter with healthy fats such as olive oil.  - Use herbs and spices instead of salt to flavor foods.  - Limit red meat and processed meats to no more than a few times a month.  - Avoid sugary sodas, bakery goods, and sweets.  - Eat fish and poultry at least twice a week.              - Get plenty of exercise (150 minutes per week).    Episode of word-finding difficulty   -symptoms likely unrelated to vascular neurologic etiology would consider anxiety vs complex migraine  -ambulatory referral to Psychiatry for assessment recommendations   Continue to follow up with Dr. Bruce for headache management    Transient loss of dexterity and coordination in the 3rd and 4th digit of the left upper extremity  Given history of symptom occurrence with increased levels of stress in addition to limited duration and self resolution would consider possible complex migraine versus recrudescence of symptoms  Patient does have stroke risk factors we will continue with aggressive risk factor modification and medical management     Dystonia of the right upper extremity  Continue propranolol  Follow-up outpatient with PCP for surveillance long-term management     Patient/Family teaching provided during this visit  -Identifying the signs and symptoms of stroke; emergency action and  ER attention  -Risk factor control  -Optimization for secondary stroke prevention including compliance with current medications       Questions and concerns were answered to the patient's stated verbal satisfaction. The patient verbalizes understanding and agreement with the above stated treatment plan.      I will plan on having Scott Gallagher return to clinic as needed. The patient can contact my office with any questions or concerns they may have as they arise in the interim.       Collaborating Physician, Dr Scott, was available during today's encounter. Any change to plan along with cosign to appear in the EMR    KILEY Mena  Department of Vascular Neurology  [facility]  [phone]    This note is dictated on M*Modal Fluency Direct word recognition program. There are word recognition mistakes that are occasionally missed on review

## 2024-01-23 ENCOUNTER — PATIENT MESSAGE (OUTPATIENT)
Dept: INTERNAL MEDICINE | Facility: CLINIC | Age: 34
End: 2024-01-23
Payer: COMMERCIAL

## 2024-01-23 ENCOUNTER — TELEPHONE (OUTPATIENT)
Dept: INTERNAL MEDICINE | Facility: CLINIC | Age: 34
End: 2024-01-23

## 2024-01-23 NOTE — TELEPHONE ENCOUNTER
Care Due:                  Date            Visit Type   Department     Provider  --------------------------------------------------------------------------------                                ESTABLISHED                              PATIENT -    [facility] INTERNAL  Stalin Kelechi  Last Visit: 11-      Kinetic      [facility]  Next Visit: None Scheduled  None         None Found                                                            Last  Test          Frequency    Reason                     Performed    Due Date  --------------------------------------------------------------------------------    CMP.........  12 months..  hydroCHLOROthiazide,       02- 02-                             olmesartan...............    [employer] Embedded Care Due Messages. Reference number: [id number].   1/23/2024 2:09:40 PM CST

## 2024-01-24 RX ORDER — ALPRAZOLAM 0.5 MG/1
0.5 TABLET ORAL 3 TIMES DAILY PRN
Qty: 10 TABLET | Refills: 0 | Status: SHIPPED | OUTPATIENT
Start: 2024-01-24 | End: 2024-02-19

## 2024-02-07 ENCOUNTER — PATIENT MESSAGE (OUTPATIENT)
Dept: NEUROLOGY | Facility: CLINIC | Age: 34
End: 2024-02-07
Payer: COMMERCIAL

## 2024-02-07 ENCOUNTER — OFFICE VISIT (OUTPATIENT)
Dept: PSYCHIATRY | Facility: CLINIC | Age: 34
End: 2024-02-07
Payer: COMMERCIAL

## 2024-02-07 VITALS
WEIGHT: 315 LBS | HEART RATE: 97 BPM | SYSTOLIC BLOOD PRESSURE: 127 MMHG | BODY MASS INDEX: 41.4 KG/M2 | DIASTOLIC BLOOD PRESSURE: 84 MMHG

## 2024-02-07 DIAGNOSIS — F41.1 GENERALIZED ANXIETY DISORDER: Primary | ICD-10-CM

## 2024-02-07 PROCEDURE — 99999 PR PBB SHADOW E&M-EST. PATIENT-LVL III: CPT | Mod: PBBFAC,,, | Performed by: STUDENT IN AN ORGANIZED HEALTH CARE EDUCATION/TRAINING PROGRAM

## 2024-02-07 PROCEDURE — 4010F ACE/ARB THERAPY RXD/TAKEN: CPT | Mod: CPTII,S$GLB,, | Performed by: STUDENT IN AN ORGANIZED HEALTH CARE EDUCATION/TRAINING PROGRAM

## 2024-02-07 PROCEDURE — 3079F DIAST BP 80-89 MM HG: CPT | Mod: CPTII,S$GLB,, | Performed by: STUDENT IN AN ORGANIZED HEALTH CARE EDUCATION/TRAINING PROGRAM

## 2024-02-07 PROCEDURE — 90792 PSYCH DIAG EVAL W/MED SRVCS: CPT | Mod: S$GLB,,, | Performed by: STUDENT IN AN ORGANIZED HEALTH CARE EDUCATION/TRAINING PROGRAM

## 2024-02-07 PROCEDURE — 3074F SYST BP LT 130 MM HG: CPT | Mod: CPTII,S$GLB,, | Performed by: STUDENT IN AN ORGANIZED HEALTH CARE EDUCATION/TRAINING PROGRAM

## 2024-02-07 RX ORDER — ESCITALOPRAM OXALATE 5 MG/1
TABLET ORAL
Qty: 53 TABLET | Refills: 0 | Status: SHIPPED | OUTPATIENT
Start: 2024-02-07 | End: 2024-02-26 | Stop reason: SDUPTHER

## 2024-02-07 NOTE — PROGRESS NOTES
OUTPATIENT PSYCHIATRY INITIAL VISIT    ENCOUNTER DATE:  2/22/2024  SITE:  [facility]  REFFERAL SOURCE:  Celena Overton, *  LENGTH OF SESSION:  50 minutes        CHIEF COMPLAINT:   anxiety    HISTORY OF PRESENTING ILLNESS:  Scott Gallagher is a 33 y.o. male with no previous psychiatric history who presents for initial assessment.      History as told by Patient - & or family/friend/spouse/caregiver with pts permission    Patient states that he has been struggling with increased anxiety lately, which has prompted him to establish care with a psychiatrist. 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 Patient has had difficulty recently when preparing for auditions, such as the [employer]. He has been taking PRN Xanax, which was prescribed to him by his PCP, but is interested in starting a new medication to help with his baseline anxiety. Patient expresses his hesitancy with medications and is interested in doing genetic testing to see which medications could potentially work best for him. He is agreeable with plan to initiation Lexapro while waiting for genetic testing results. He denies any suicidal ideation, intent, or plan.       PSYCHIATRIC REVIEW OF SYMPTOMS: (Is patient experiencing or having changes in any of the following?)    Symptoms of Depression: diminished mood or loss of interest/anhedonia; change in sleep, change in appetite, diminished concentration, PHQ-9 = 9    Symptoms of CHEMO: excessive anxiety/worry/fear, more days than not, about numerous issues, difficult to control, with restlessness, fatigue, poor concentration, irritability, muscle tension, sleep disturbance; causes functionally impairing distress, CHEMO-7 = 19    Symptoms of Panic Attacks: denies    Symptoms of enma or hypomania: denies    Symptoms of psychosis: denies    Symptoms of PTSD: denies       Risk  Parameters:  Patient reports no suicidal ideation  Patient reports no homicidal ideation  Patient reports no self-injurious behavior  Patient reports no violent behavior    PSYCHIATRIC MED REVIEW    Current psych meds  Medication side effects:  no  Medication compliance:  yes    Previous psych meds trials    PAST PSYCHIATRIC HISTORY:  Previous Psychiatric Diagnoses:  none  Previous Psychiatric Hospitalizations:  no   Previous SI/HI:  passive thoughts as child of not wanting to be born, never any intent or plan  Previous Suicide Attempts:  no  Previous Self injurious behaviors: no  Previous Medication Trials:  Xanax, Lamictal for ocular aura, and Gabapentin for pain  Psychiatric Care (current & past):  no  History of Psychotherapy:  yes, on and off since 2020, approximately once a month at present  History of Violence:  no    SUBSTANCE ABUSE HISTORY:  Caffeine: 2 cups and tea, none after 3PM  Tobacco:  no  Alcohol:  1-2 drinks, 2/3 days per week , previously drank a handle of liquor per weekend, but stopped 2-3 years ago  Illicit Substances:  no  Misuse of Prescription Medications:  no  Other: Herbal supplements/ online supplements: no    If positve history  Detoxes:  no  Rehabs:  no  12 Step Meetings:  no  Periods of Sobriety:  no  Withdrawal:  no    FAMILY HISTORY:  Psychiatric:  no  Family H/o suicide:  no    SOCIAL HISTORY:  Developmental/Childhood:Achieved all developmental milestones timely  Education: masters degree in music compisition  Employment Status/Finances:Employed   Relationship Status/Sexual Orientation: engaged  Children: 0  Housing Status: Home    history:  NO  Access to Firearms: NO;  Locked up?  Christian: Taoism  Recreational activities:Music/CT    LEGAL HISTORY:   Past charges/incarcerations: No   Pending charges:No     NEUROLOGIC HISTORY:  Seizures:  no   Head trauma:  stroke in 2021    MEDICAL REVIEW OF SYSTEMS:  Complete review of systems performed covering Constitutional,  Cardiovascular, Respiratory, Gastrointestinal, Musculoskeletal, Skin, Neurologic and Endocrine  All systems negative/ except for nerve pain and ocular aura.    MEDICAL HISTORY:  Past Medical History:   Diagnosis Date    Hypertension        ALL MEDICATIONS:    Current Outpatient Medications:     amLODIPine (NORVASC) 10 MG tablet, Take 1 tablet (10 mg total) by mouth once daily., Disp: 90 tablet, Rfl: 3    amoxicillin-clavulanate 875-125mg (AUGMENTIN) 875-125 mg per tablet, Take 1 tablet by mouth 2 (two) times daily. for 7 days, Disp: 14 tablet, Rfl: 0    aspirin 81 MG Chew, Take 81 mg by mouth once daily., Disp: , Rfl:     benzonatate (TESSALON) 200 MG capsule, Take 1 capsule (200 mg total) by mouth 3 (three) times daily as needed for Cough., Disp: 21 capsule, Rfl: 0    EScitalopram oxalate (LEXAPRO) 5 MG Tab, Take 1 tablet (5 mg total) by mouth once daily for 7 days, THEN 2 tablets (10 mg total) once daily for 23 days., Disp: 53 tablet, Rfl: 0    gabapentin (NEURONTIN) 300 MG capsule, Take 1 capsule (300 mg total) by mouth every evening., Disp: 30 capsule, Rfl: 11    hydroCHLOROthiazide (HYDRODIURIL) 25 MG tablet, Take 1 tablet (25 mg total) by mouth once daily., Disp: 30 tablet, Rfl: 11    lamoTRIgine (LAMICTAL) 100 MG tablet, Take 100 mg by mouth once daily., Disp: , Rfl:     ALLERGIES:  Review of patient's allergies indicates:   Allergen Reactions    Anti-nausea [phosphorated carbohydrate] Hives     Pt unsure of name of Rx; states 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       RELEVANT LABS/STUDIES:    Lab Results   Component Value Date    WBC 7.01 12/05/2019    HGB 17.1 12/05/2019    HCT 46.9 12/05/2019    MCV 82 12/05/2019     12/05/2019     BMP  Lab Results   Component Value Date     02/17/2023    K 3.6 02/17/2023     02/17/2023    CO2 27 02/17/2023    BUN 11 02/17/2023    CREATININE 1.0 02/17/2023    CALCIUM 9.6 02/17/2023     ANIONGAP 8 02/17/2023    ESTGFRAFRICA >60 12/05/2019    EGFRNONAA >60 12/05/2019     Lab Results   Component Value Date    ALT 46 (H) 02/17/2023    AST 23 02/17/2023    ALKPHOS 78 02/17/2023    BILITOT 0.5 02/17/2023     Lab Results   Component Value Date    TSH 1.789 02/17/2023     Lab Results   Component Value Date    HGBA1C 5.5 02/17/2023         VITALS  Vitals:    02/07/24 1414   BP: 127/84   Pulse: 97   Weight: (!) 150.3 kg (331 lb 3.9 oz)       PHYSICAL EXAM  General: well developed, well nourished  Neurologic:   Gait: Normal   Psychomotor signs:  No involuntary movements or tremor  AIMS: none    PSYCHIATRIC EXAM:    Mental Status Exam:  Appearance: unremarkable, age appropriate  Behavior/Cooperation: limited/ appropriate normal, friendly and cooperative, eye contact normal  Speech:  normal tone, normal rate, normal pitch, normal volume  Language: uses words appropriately; NO aphasia or dysarthria  Mood: anxious  Affect:  mood congruent  Thought Process: normal and logical  Thought Content: normal, no suicidality, no homicidality, delusions, or paranoia  Level of Consciousness: Alert and Oriented x3  Memory:  Intact   Attention/concentration: appropriate for age/education.   Fund of Knowledge: appears adequate  Insight: Intact  Judgment: Intact       IMPRESSION:    Scott Gallagher is a 33 y.o. male with no previous psychiatric history who presents for initial assessment.    DIAGNOSES:    ICD-10-CM ICD-9-CM   1. Generalized anxiety disorder  F41.1 300.02       PLAN:  Psych Med:  Start Lexapro 5 mg PO Daily  Genetic testing ordered  Discussed with patient informed consent, risks vs. benefits, alternative treatments, side effect profile and the inherent unpredictability of individual responses to these treatments. Answered any questions patient may have had. The patient expresses understanding of the above and displays the capacity to agree with this current plan     Other: none    RETURN TO CLINIC:  1 month        Jose M Wei MD  [facility], PGY-3

## 2024-02-14 ENCOUNTER — OFFICE VISIT (OUTPATIENT)
Dept: INTERNAL MEDICINE | Facility: CLINIC | Age: 34
End: 2024-02-14
Payer: COMMERCIAL

## 2024-02-14 ENCOUNTER — ON-DEMAND VIRTUAL (OUTPATIENT)
Dept: URGENT CARE | Facility: CLINIC | Age: 34
End: 2024-02-14
Payer: COMMERCIAL

## 2024-02-14 DIAGNOSIS — J06.9 UPPER RESPIRATORY TRACT INFECTION, UNSPECIFIED TYPE: Primary | ICD-10-CM

## 2024-02-14 DIAGNOSIS — J02.9 SORE THROAT: Primary | ICD-10-CM

## 2024-02-14 PROCEDURE — 4010F ACE/ARB THERAPY RXD/TAKEN: CPT | Mod: CPTII,95,, | Performed by: STUDENT IN AN ORGANIZED HEALTH CARE EDUCATION/TRAINING PROGRAM

## 2024-02-14 PROCEDURE — 99202 OFFICE O/P NEW SF 15 MIN: CPT | Mod: 95,,, | Performed by: NURSE PRACTITIONER

## 2024-02-14 PROCEDURE — 1159F MED LIST DOCD IN RCRD: CPT | Mod: CPTII,95,, | Performed by: STUDENT IN AN ORGANIZED HEALTH CARE EDUCATION/TRAINING PROGRAM

## 2024-02-14 PROCEDURE — 99212 OFFICE O/P EST SF 10 MIN: CPT | Mod: 95,,, | Performed by: STUDENT IN AN ORGANIZED HEALTH CARE EDUCATION/TRAINING PROGRAM

## 2024-02-14 RX ORDER — PREDNISONE 10 MG/1
10 TABLET ORAL 2 TIMES DAILY
Qty: 6 TABLET | Refills: 0 | Status: SHIPPED | OUTPATIENT
Start: 2024-02-14 | End: 2024-02-17

## 2024-02-14 NOTE — PROGRESS NOTES
Subjective:       Patient ID: Scott Gallagher is a 33 y.o. male.    The patient location is: home  The chief complaint leading to consultation is: Sore Throat    Visit type: audiovisual  Total time spent with patient: 6 minutes  Each patient to whom he or she provides medical services by telemedicine is:  (1) informed of the relationship between the physician and patient and the respective role of any other health care provider with respect to management of the patient; and (2) notified that he or she may decline to receive medical services by telemedicine and may withdraw from such care at any time.    Sore Throat   Associated symptoms include congestion and coughing. Pertinent negatives include no abdominal pain, shortness of breath, trouble swallowing or vomiting.     C/o of 5 days of sore throat, and progression to cough, chest and sinus congestion. He notes pain with palpation of L sided of throat. Denies f/c, CP or SOB, or dyspnea. He notes L tonsillar mucus, but denies pus like drainage. He was prescribed prednisone earlier today but has not started. He inquires if there is anything else he should do.    Family History   Problem Relation Age of Onset    Hypertension Mother     Diabetes type II Mother     Hypertension Father     Diabetes type II Father     Hypertension Sister 28    Hypertension Brother     Hypertension Brother     Hypertension Brother     Hypertension Sister        Current Outpatient Medications:     amLODIPine (NORVASC) 10 MG tablet, Take 1 tablet (10 mg total) by mouth once daily., Disp: 90 tablet, Rfl: 3    aspirin 81 MG Chew, Take 81 mg by mouth once daily., Disp: , Rfl:     EScitalopram oxalate (LEXAPRO) 5 MG Tab, Take 1 tablet (5 mg total) by mouth once daily for 7 days, THEN 2 tablets (10 mg total) once daily for 23 days., Disp: 53 tablet, Rfl: 0    gabapentin (NEURONTIN) 300 MG capsule, Take 1 capsule (300 mg total) by mouth every evening., Disp: 30 capsule, Rfl: 11     hydroCHLOROthiazide (HYDRODIURIL) 25 MG tablet, Take 1 tablet (25 mg total) by mouth once daily., Disp: 30 tablet, Rfl: 11    lamoTRIgine (LAMICTAL) 100 MG tablet, Take 100 mg by mouth once daily., Disp: , Rfl:     ALPRAZolam (XANAX) 0.5 MG tablet, Take 1 tablet (0.5 mg total) by mouth 3 (three) times daily as needed for Anxiety. (Patient not taking: Reported on 2/14/2024), Disp: 10 tablet, Rfl: 0    olmesartan (BENICAR) 40 MG tablet, Take 1 tablet (40 mg total) by mouth once daily. (Patient not taking: Reported on 2/14/2024), Disp: 90 tablet, Rfl: 3    predniSONE (DELTASONE) 10 MG tablet, Take 1 tablet (10 mg total) by mouth 2 (two) times daily. for 3 days (Patient not taking: Reported on 2/14/2024), Disp: 6 tablet, Rfl: 0    propranoloL (INDERAL) 10 MG tablet, Take 1 tablet (10 mg total) by mouth 3 (three) times daily as needed (for tremor). (Patient not taking: Reported on 2/14/2024), Disp: 90 tablet, Rfl: 1    Review of Systems   Constitutional:  Negative for chills and fever.   HENT:  Positive for congestion, sinus pressure and sore throat. Negative for rhinorrhea and trouble swallowing.    Respiratory:  Positive for cough. Negative for chest tightness and shortness of breath.    Cardiovascular:  Negative for chest pain.   Gastrointestinal:  Negative for abdominal pain, nausea and vomiting.   Neurological:  Negative for weakness.       Objective:   There were no vitals taken for this visit.       Physical Exam  Constitutional:       General: He is not in acute distress.  Pulmonary:      Effort: Pulmonary effort is normal. No respiratory distress.   Neurological:      Mental Status: He is alert.   Psychiatric:         Behavior: Behavior normal.         Assessment & Plan   1. Sore throat  -given inability to examine the oropharynx, I have discussed with patient to try prescribed prednisone from previous virtual urgent care visit to see if it helps with his symptomatology.  If it does not improve in the next 1-2  days.  He should seek evaluation for physical examination of the oropharynx.  He understands the limitation of this virtual visit.  All questions answered.  Close return precautions given.    Follow up if symptoms worsen or fail to improve.    Disclaimer:  This note may have been prepared using voice recognition software, it may have not been extensively proofed, as such there could be errors within the text such as sound alike errors.

## 2024-02-14 NOTE — PROGRESS NOTES
Subjective:      Patient ID: Scott Gallagher is a 33 y.o. male.    Vitals:  vitals were not taken for this visit.     Chief Complaint: Sore Throat and Sinus Problem      Visit Type: TELE AUDIOVISUAL    Present with the patient at the time of consultation: TELEMED PRESENT WITH PATIENT: None    Past Medical History:   Diagnosis Date    Hypertension      Past Surgical History:   Procedure Laterality Date    APPENDECTOMY  2004    EPIDURAL STEROID INJECTION N/A 7/21/2023    Procedure: L4-5 LESI (toward the left);  Surgeon: Dom Jha DO;  Location: [facility];  Service: Pain Management;  Laterality: N/A;  oral sedation    WISDOM TOOTH EXTRACTION  2011     Review of patient's allergies indicates:   Allergen Reactions    Anti-nausea [phosphorated carbohydrate] Hives     Pt unsure of name of Rx; states 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     Current Outpatient Medications on File Prior to Visit   Medication Sig Dispense Refill    ALPRAZolam (XANAX) 0.5 MG tablet Take 1 tablet (0.5 mg total) by mouth 3 (three) times daily as needed for Anxiety. 10 tablet 0    amLODIPine (NORVASC) 10 MG tablet Take 1 tablet (10 mg total) by mouth once daily. 90 tablet 3    aspirin 81 MG Chew Take 81 mg by mouth once daily.      EScitalopram oxalate (LEXAPRO) 5 MG Tab Take 1 tablet (5 mg total) by mouth once daily for 7 days, THEN 2 tablets (10 mg total) once daily for 23 days. 53 tablet 0    gabapentin (NEURONTIN) 300 MG capsule Take 1 capsule (300 mg total) by mouth every evening. 30 capsule 11    hydroCHLOROthiazide (HYDRODIURIL) 25 MG tablet Take 1 tablet (25 mg total) by mouth once daily. 30 tablet 11    lamoTRIgine (LAMICTAL) 100 MG tablet Take 100 mg by mouth once daily.      olmesartan (BENICAR) 40 MG tablet Take 1 tablet (40 mg total) by mouth once daily. 90 tablet 3    propranoloL (INDERAL) 10 MG tablet Take 1 tablet (10 mg total) by mouth 3  (three) times daily as needed (for tremor). 90 tablet 1     No current facility-administered medications on file prior to visit.     Family History   Problem Relation Age of Onset    Hypertension Mother     Diabetes type II Mother     Hypertension Father     Diabetes type II Father     Hypertension Sister 28    Hypertension Brother     Hypertension Brother     Hypertension Brother     Hypertension Sister        Medications Ordered                [id number] - [place], [place] - [address]   [address] [address]    Telephone: [phone]   Fax: [phone]   Hours: Not open 24 hours                         E-Prescribed (1 of 1)              predniSONE (DELTASONE) 10 MG tablet    Sig: Take 1 tablet (10 mg total) by mouth 2 (two) times daily. for 3 days       Start: 2/14/24     Quantity: 6 tablet Refills: 0                           Ohs Peq Odvv Intake    2/14/2024 12:12 PM CST - Filed by Patient   What is your current physical address in the event of a medical emergency? [address] [place] [address]   Are you able to take your vital signs? Yes   Systolic Blood Pressure: 136   Diastolic Blood Pressure: 88   Weight: 325   Height: 76   Pulse: 76   Temperature: 97.7   Respiration rate:    Pulse Oxygen:    Please attach any relevant images or files          Congestion and sore throat for 5 days. Recent travel. COVID negative. Taking OTC meds with little relief.    Sore Throat   Associated symptoms include congestion and coughing. Pertinent negatives include no shortness of breath or trouble swallowing.   Sinus Problem  Associated symptoms include congestion, coughing, sinus pressure and a sore throat. Pertinent negatives include no chills or shortness of breath.       Constitution: Negative for chills and fever.   HENT:  Positive for congestion, sinus pressure and sore throat. Negative for trouble swallowing and voice change.    Neck: Positive for painful lymph nodes.    Respiratory:  Positive for cough. Negative for shortness of breath and wheezing.    Musculoskeletal:  Negative for muscle ache.   Hematologic/Lymphatic: Positive for swollen lymph nodes.        Objective:   The physical exam was conducted virtually.  Physical Exam   Constitutional: He is oriented to person, place, and time. He does not appear ill. No distress.   HENT:   Head: Normocephalic and atraumatic.   Nose: Nose normal.   Eyes: Extraocular movement intact   Pulmonary/Chest: Effort normal.   Abdominal: Normal appearance.   Musculoskeletal: Normal range of motion.         General: Normal range of motion.   Neurological: no focal deficit. He is alert and oriented to person, place, and time.   Psychiatric: His behavior is normal. Mood normal.   Vitals reviewed.      Assessment:     1. Upper respiratory tract infection, unspecified type        Plan:   Patient encouraged to monitor symptoms closely and instructed to follow-up for new or worsening symptoms. Further, in-person, evaluation may be necessary for continued treatment. Please follow up with your primary care doctor or specialist as needed. Verbally discussed plan. Patient confirms understanding and is in agreement with treatment and plan.     You must understand that you've received a [facility] Care evaluation only and that you may be released before all your medical problems are known or treated. You, the patient, will arrange for follow up care as instructed.      Upper respiratory tract infection, unspecified type  -     predniSONE (DELTASONE) 10 MG tablet; Take 1 tablet (10 mg total) by mouth 2 (two) times daily. for 3 days  Dispense: 6 tablet; Refill: 0             Patient Instructions   OVER THE COUNTER RECOMMENDATIONS/SUGGESTIONS (IF NO CONTRAINDICATIONS).     ·         Make sure to stay well hydrated.     ·         Use Nasal Saline to mechanically move any post nasal drip from your eustachian tube or from the back of your throat.     ·         Use  warm saltwater gargles to ease your throat pain. Warm saltwater gargles as needed for sore throat-  1/2 tsp salt to 1 cup warm water, gargle as desired. Warm fluids tend to relieve a sore throat.     .         Throat lozenges, Chloraseptic spray or other over the counter treatments are ok to use as well. Use as directed.     ·         Use an antihistamine such as Claritin, Zyrtec or Allegra to dry you out.     ·         Use pseudoephedrine (behind the counter) to decongest. Pseudoephedrine  30 mg up to 240 mg /day. It can raise your blood pressure and give you palpitations.     ·         Use Mucinex (guaifenesin) to break up mucous up to 2400mg/day to loosen any mucous.     ·         The Mucinex DM pill has a cough suppressant that can be sedating. It can be used at night to stop the tickle at the back of your throat.     ·         You can use Mucinex D (it has guaifenesin and a high dose of pseudoephedrine) in the mornings to help decongest.     ·         Use Afrin (oxymetazoline) in each nare for no longer than 3 days, as it is addictive. It can also dry out your mucous membranes and cause elevated blood pressure. This is especially useful if you are flying.     ·         Use Flonase 1-2 sprays/nostril per day. It is a local acting steroid nasal spray, if you develop a bloody nose, stop using the medication immediately.     ·         Sometimes Nyquil at night is beneficial to help you get some rest, however it is sedating, and it does have an antihistamine, and Tylenol.     ·         Honey is a natural cough suppressant that can be used.     ·         Tylenol up to 4,000 mg a day is safe for short periods and can be used for body aches, pain, and fever. However, in high doses and prolonged use it can cause liver irritation.     ·         Ibuprofen is a non-steroidal anti-inflammatory that can be used for body aches, pain, and fever. However, it can also cause stomach irritation if overused.

## 2024-02-19 ENCOUNTER — OFFICE VISIT (OUTPATIENT)
Dept: URGENT CARE | Facility: CLINIC | Age: 34
End: 2024-02-19
Payer: COMMERCIAL

## 2024-02-19 VITALS
HEART RATE: 80 BPM | WEIGHT: 315 LBS | OXYGEN SATURATION: 98 % | BODY MASS INDEX: 39.17 KG/M2 | RESPIRATION RATE: 20 BRPM | SYSTOLIC BLOOD PRESSURE: 133 MMHG | TEMPERATURE: 98 F | HEIGHT: 75 IN | DIASTOLIC BLOOD PRESSURE: 78 MMHG

## 2024-02-19 DIAGNOSIS — R05.9 COUGH, UNSPECIFIED TYPE: Primary | ICD-10-CM

## 2024-02-19 DIAGNOSIS — J20.9 ACUTE BRONCHITIS DUE TO INFECTION: ICD-10-CM

## 2024-02-19 DIAGNOSIS — J02.9 ACUTE PHARYNGITIS, UNSPECIFIED ETIOLOGY: ICD-10-CM

## 2024-02-19 DIAGNOSIS — J01.90 ACUTE NON-RECURRENT SINUSITIS, UNSPECIFIED LOCATION: ICD-10-CM

## 2024-02-19 LAB
CTP QC/QA: YES
SARS-COV-2 AG RESP QL IA.RAPID: NEGATIVE

## 2024-02-19 PROCEDURE — 87811 SARS-COV-2 COVID19 W/OPTIC: CPT | Mod: QW,S$GLB,, | Performed by: FAMILY MEDICINE

## 2024-02-19 PROCEDURE — 99214 OFFICE O/P EST MOD 30 MIN: CPT | Mod: S$GLB,,, | Performed by: FAMILY MEDICINE

## 2024-02-19 RX ORDER — AMOXICILLIN AND CLAVULANATE POTASSIUM 875; 125 MG/1; MG/1
1 TABLET, FILM COATED ORAL 2 TIMES DAILY
Qty: 14 TABLET | Refills: 0 | Status: SHIPPED | OUTPATIENT
Start: 2024-02-19 | End: 2024-02-26

## 2024-02-19 RX ORDER — BENZONATATE 200 MG/1
200 CAPSULE ORAL 3 TIMES DAILY PRN
Qty: 21 CAPSULE | Refills: 0 | Status: SHIPPED | OUTPATIENT
Start: 2024-02-19 | End: 2024-02-26

## 2024-02-19 RX ORDER — BENZONATATE 200 MG/1
200 CAPSULE ORAL 3 TIMES DAILY PRN
Qty: 21 CAPSULE | Refills: 0 | Status: SHIPPED | OUTPATIENT
Start: 2024-02-19 | End: 2024-02-19

## 2024-02-19 RX ORDER — AMOXICILLIN AND CLAVULANATE POTASSIUM 875; 125 MG/1; MG/1
1 TABLET, FILM COATED ORAL 2 TIMES DAILY
Qty: 14 TABLET | Refills: 0 | Status: SHIPPED | OUTPATIENT
Start: 2024-02-19 | End: 2024-02-19

## 2024-02-19 NOTE — PROGRESS NOTES
"Subjective:      Patient ID: Scott Gallagher is a 33 y.o. male.    Vitals:  height is 6' 3" (1.905 m) and weight is 150.1 kg (331 lb) (abnormal). His temperature is 98.3 °F (36.8 °C). His blood pressure is 133/78 and his pulse is 80. His respiration is 20 and oxygen saturation is 98%.     Chief Complaint: Sore Throat (Also persistent cough, sinus congestion - Entered by patient)    Pt presents with complaint of sinus pressure, cough, sore throat x2 weeks.  Pt states he was seen via telehealth and prescribed prednisone for his symptoms.  Pt states he was feeling better but states his symptoms flared back up once his medications were complete.  Pt states he has also taken twila seltzer for his symptoms.     Sore Throat   This is a new problem. The current episode started 1 to 4 weeks ago. The problem has been unchanged. Neither side of throat is experiencing more pain than the other. There has been no fever. The pain is at a severity of 5/10. The pain is moderate. Associated symptoms include coughing. He has had no exposure to strep or mono. Treatments tried: prednisone, twila seltzer. The treatment provided mild relief.       HENT:  Positive for sore throat.    Respiratory:  Positive for cough.       Objective:     Physical Exam   Constitutional: He is oriented to person, place, and time. He appears well-developed. He is cooperative.  Non-toxic appearance. He does not appear ill. No distress.   HENT:   Head: Normocephalic and atraumatic.   Ears:   Right Ear: Hearing, tympanic membrane, external ear and ear canal normal. impacted cerumen  Left Ear: Hearing, tympanic membrane, external ear and ear canal normal. impacted cerumen  Nose: Congestion present. No mucosal edema, rhinorrhea or nasal deformity. No epistaxis. Right sinus exhibits no maxillary sinus tenderness and no frontal sinus tenderness. Left sinus exhibits no maxillary sinus tenderness and no frontal sinus tenderness.   Mouth/Throat: Uvula is midline, " oropharynx is clear and moist and mucous membranes are normal. No trismus in the jaw. Normal dentition. No uvula swelling. No oropharyngeal exudate or posterior oropharyngeal edema.      Comments: +ve pharyngeal erythema w/o tonsillar swelling or exudates.        Eyes: Conjunctivae and lids are normal. No scleral icterus.   Neck: Trachea normal and phonation normal. Neck supple. No edema present. No erythema present. No neck rigidity present.   Cardiovascular: Normal rate, regular rhythm, normal heart sounds and normal pulses.   No murmur heard.  Pulmonary/Chest: Effort normal and breath sounds normal. No stridor. No respiratory distress. He has no decreased breath sounds. He has no wheezes. He has no rhonchi. He has no rales.   Abdominal: Normal appearance. He exhibits no distension. There is no abdominal tenderness. There is no left CVA tenderness and no right CVA tenderness.   Musculoskeletal: Normal range of motion.         General: No deformity. Normal range of motion.      Cervical back: He exhibits no tenderness.   Lymphadenopathy:     He has no cervical adenopathy.   Neurological: He is alert, oriented to person, place, and time and at baseline. He exhibits normal muscle tone. Coordination normal.   Skin: Skin is warm, dry, intact, not diaphoretic and not pale.   Psychiatric: His speech is normal and behavior is normal. Judgment and thought content normal.   Nursing note and vitals reviewed.      Assessment:     1. Cough, unspecified type    2. Acute pharyngitis, unspecified etiology    3. Acute bronchitis due to infection    4. Acute non-recurrent sinusitis, unspecified location        Plan:   Discussed exam findings/results/diagnosis/plan with patient in clinic.  Sinus precautions advised.  Advised to f/u with PCP within 2-5 days. ER precautions given if symptoms get any worse. All questions answered. Patient verbally understood and agreed with treatment plan.     Cough, unspecified type  -     SARS  Coronavirus 2 Antigen, POCT Manual Read    Acute pharyngitis, unspecified etiology    Acute bronchitis due to infection    Acute non-recurrent sinusitis, unspecified location    Other orders  -     amoxicillin-clavulanate 875-125mg (AUGMENTIN) 875-125 mg per tablet; Take 1 tablet by mouth 2 (two) times daily. for 7 days  Dispense: 14 tablet; Refill: 0  -     benzonatate (TESSALON) 200 MG capsule; Take 1 capsule (200 mg total) by mouth 3 (three) times daily as needed for Cough.  Dispense: 21 capsule; Refill: 0

## 2024-02-21 ENCOUNTER — PATIENT MESSAGE (OUTPATIENT)
Dept: ORTHOPEDICS | Facility: CLINIC | Age: 34
End: 2024-02-21
Payer: COMMERCIAL

## 2024-02-21 ENCOUNTER — TELEPHONE (OUTPATIENT)
Dept: URGENT CARE | Facility: CLINIC | Age: 34
End: 2024-02-21
Payer: COMMERCIAL

## 2024-02-22 ENCOUNTER — TELEPHONE (OUTPATIENT)
Dept: PAIN MEDICINE | Facility: CLINIC | Age: 34
End: 2024-02-22
Payer: COMMERCIAL

## 2024-02-22 DIAGNOSIS — M54.16 LUMBAR RADICULOPATHY: Primary | ICD-10-CM

## 2024-02-22 DIAGNOSIS — M54.16 LUMBAR RADICULOPATHY, CHRONIC: Primary | ICD-10-CM

## 2024-02-22 NOTE — PROGRESS NOTES
Spoke with pt virtually. Pt was last seen 5/29/23 and continues to have back pain. Pt has tried home exercises and gabapentin with no relief. Pain is 8/10. I provided the patient with a home exercise program. It is the [facility] spine conditioning program. Exercises include head rolls, kneeling back extension, sitting rotation stretch, modified seated side straddle, knee to chest, bird dog, plank, modified seated plank, hip bridges, abdominal bracing, and abdominal crunch. Pt completes each exercise daily for one hour with worsening of pain. MRI demonstrates left paracentral disc bulge at L4-5. Pt had an IL L4-5 SUSANNA on 7/21/23 with 100% relief for 7 months. Will order repeat IL L4-5 (L) SUSANNA with pain management. Pt will fu if pain persists.

## 2024-02-22 NOTE — TELEPHONE ENCOUNTER
----- Message from Lupe Rodriguez PA-C sent at 2024  9:23 AM CST -----  Regarding: Order for LIZ MEZA    Patient Name: LIZ MEZA([id number])  Sex: Male  : 1990      PCP: LETA KAYE    Center: [facility]     Types of orders made on 2024: Procedure Request    Order Date:2024  Ordering User:LUPE RODRIGUEZ [[id number]]  Encounter Provider:Lupe Rodriguez PA-C [[id number]]  Authoriz  ing Provider: Lupe Rodriguez PA-C [[id number]]  Supervising Provider:IOANA SWANSON [[id number]]  Type of Supervision:Supervision Required  Department:[facility][[id number]]    Common Order Information  Procedure -> Epidural Injection (specify level) Cmt: IL L4-5 (AIM LEFT)    Order Specific Information  Order: Procedure Order to Pain Management [Custom: [id number]]  Order #:          [id number]: 1 FUTURE  Z  1   Priority: Routine  Class: Clinic Performed    Future Order Information      Expires on:2025            Expected by:2024                   Associated Diagnoses      M54.16 Lumbar radiculopathy, chronic      Facility Name: -> [place]           Priority: Routine  Class: Clinic Performed    Future Order Information      Expires on:2025            Expected by:                   Associated Diagnoses      M54.16 Lumbar radiculopathy, chronic      Procedure -> Epidural Injection (specify level) Cmt: IL L4-5 (AIM LEFT)        Facility Name: -> [place]

## 2024-02-22 NOTE — TELEPHONE ENCOUNTER
----- Message from Lupe Rodriguez PA-C sent at 2024  9:23 AM CST -----  Regarding: Order for LIZ MEZA    Patient Name: LIZ MEZA([id number])  Sex: Male  : 1990      PCP: LETA KAYE    Center: [facility]     Types of orders made on 2024: Procedure Request    Order Date:2024  Ordering User:LUPE RODRIGUEZ [[id number]]  Encounter Provider:Lupe Rodriguez PA-C [[id number]]  Authoriz  ing Provider: Lupe Rodriguez PA-C [[id number]]  Supervising Provider:IOANA SWANSON [[id number]]  Type of Supervision:Supervision Required  Department:[facility][[id number]]    Common Order Information  Procedure -> Epidural Injection (specify level) Cmt: IL L4-5 (AIM LEFT)    Order Specific Information  Order: Procedure Order to Pain Management [Custom: [id number]]  Order #:          [id number]: 1 FUTURE  Z  1   Priority: Routine  Class: Clinic Performed    Future Order Information      Expires on:2025            Expected by:2024                   Associated Diagnoses      M54.16 Lumbar radiculopathy, chronic      Facility Name: -> [place]           Priority: Routine  Class: Clinic Performed    Future Order Information      Expires on:2025            Expected by:                   Associated Diagnoses      M54.16 Lumbar radiculopathy, chronic      Procedure -> Epidural Injection (specify level) Cmt: IL L4-5 (AIM LEFT)        Facility Name: -> [place]

## 2024-02-22 NOTE — TELEPHONE ENCOUNTER
Patient needed rx sent over to [facility]. I called patient to make sure this was handled. Patient requested that we delete the [facility] at [address]. I deleted and removed the pharmacy as a favorite. No questions at time of call. anupam

## 2024-02-26 DIAGNOSIS — F41.1 GENERALIZED ANXIETY DISORDER: ICD-10-CM

## 2024-02-27 ENCOUNTER — OFFICE VISIT (OUTPATIENT)
Dept: URGENT CARE | Facility: CLINIC | Age: 34
End: 2024-02-27
Payer: COMMERCIAL

## 2024-02-27 ENCOUNTER — PATIENT MESSAGE (OUTPATIENT)
Dept: PSYCHIATRY | Facility: CLINIC | Age: 34
End: 2024-02-27
Payer: COMMERCIAL

## 2024-02-27 VITALS
HEART RATE: 85 BPM | OXYGEN SATURATION: 95 % | DIASTOLIC BLOOD PRESSURE: 82 MMHG | BODY MASS INDEX: 38.36 KG/M2 | SYSTOLIC BLOOD PRESSURE: 122 MMHG | RESPIRATION RATE: 16 BRPM | TEMPERATURE: 98 F | HEIGHT: 76 IN | WEIGHT: 315 LBS

## 2024-02-27 DIAGNOSIS — J02.9 SORE THROAT: ICD-10-CM

## 2024-02-27 DIAGNOSIS — J02.9 VIRAL PHARYNGITIS: Primary | ICD-10-CM

## 2024-02-27 LAB
CTP QC/QA: YES
HETEROPH AB SER QL: NEGATIVE
POC MOLECULAR INFLUENZA A AGN: NEGATIVE
POC MOLECULAR INFLUENZA B AGN: NEGATIVE
SARS-COV-2 RDRP RESP QL NAA+PROBE: NEGATIVE

## 2024-02-27 PROCEDURE — 86665 EPSTEIN-BARR CAPSID VCA: CPT | Performed by: NURSE PRACTITIONER

## 2024-02-27 PROCEDURE — 86645 CMV ANTIBODY IGM: CPT | Performed by: NURSE PRACTITIONER

## 2024-02-27 PROCEDURE — 99213 OFFICE O/P EST LOW 20 MIN: CPT | Mod: S$GLB,,, | Performed by: NURSE PRACTITIONER

## 2024-02-27 PROCEDURE — 86665 EPSTEIN-BARR CAPSID VCA: CPT | Mod: 59 | Performed by: NURSE PRACTITIONER

## 2024-02-27 PROCEDURE — 87635 SARS-COV-2 COVID-19 AMP PRB: CPT | Mod: QW,S$GLB,, | Performed by: NURSE PRACTITIONER

## 2024-02-27 PROCEDURE — 86644 CMV ANTIBODY: CPT | Performed by: NURSE PRACTITIONER

## 2024-02-27 PROCEDURE — 86308 HETEROPHILE ANTIBODY SCREEN: CPT | Mod: QW,S$GLB,, | Performed by: NURSE PRACTITIONER

## 2024-02-27 PROCEDURE — 87502 INFLUENZA DNA AMP PROBE: CPT | Mod: QW,S$GLB,, | Performed by: NURSE PRACTITIONER

## 2024-02-27 RX ORDER — PREDNISONE 20 MG/1
TABLET ORAL
Qty: 7 TABLET | Refills: 0 | Status: SHIPPED | OUTPATIENT
Start: 2024-02-27 | End: 2024-03-27 | Stop reason: ALTCHOICE

## 2024-02-27 NOTE — LETTER
February 27, 2024      [facility] [address] [address]  Phone: [phone]  Fax: [phone]       Patient: Scott Gallagher   YOB: 1990  Date of Visit: 02/27/2024    To Whom It May Concern:    Dileep Gallagher  was at [facility] on 02/27/2024. The patient may fly 3/5/2024.   If you have any questions or concerns, or if I can be of further assistance, please do not hesitate to contact me.    Sincerely,    Tanya Villela, NP

## 2024-02-27 NOTE — PROGRESS NOTES
"Subjective:      Patient ID: Scott Gallagher is a 33 y.o. male.    Vitals:  height is 6' 4" (1.93 m) and weight is 145.6 kg (321 lb) (abnormal). His oral temperature is 98.1 °F (36.7 °C). His blood pressure is 122/82 and his pulse is 85. His respiration is 16 and oxygen saturation is 95%.     Chief Complaint: Cough, Sinus Problem, and Sore Throat    PT IS HERE WITH COUGH AND CONGESTION, SORE THROAT, AND FEVER.   Provider/student note begins below        Patient states he started having sore throat and painful swallowing a couple weeks ago. He did a telehealth visit and was given steroids. Patient stated symptoms were improving but at the end of the course, sore throat worsened again. When sore throat continued to persist, patient came into UC and was treated for a sinus infection with Augmentin. Patient states the abx course finished yesterday and patient states last night he started feeling warm, having nasal congestion, cough, body aches and a bad sore throat. Endorses headache, sinus pressure and bilateral ear pain.   Steroids were helpful.  Antibiotics were not.          Sore Throat   This is a new problem. The current episode started yesterday. The problem has been unchanged. Associated symptoms include congestion, coughing, ear pain, headaches, a hoarse voice, shortness of breath and trouble swallowing. Pertinent negatives include no diarrhea, swollen glands or vomiting. Treatments tried: ALKESELTERS COLD & SORE THROAT. The treatment provided mild relief.       Constitution: Negative for sweating, fatigue and fever.   HENT:  Positive for ear pain, congestion, sinus pressure, sore throat and trouble swallowing.    Respiratory:  Positive for cough and shortness of breath.    Gastrointestinal:  Negative for nausea, vomiting, constipation and diarrhea.   Neurological:  Positive for headaches.      Objective:     Physical Exam   Constitutional: He is oriented to person, place, and time. He appears well-developed. He " is cooperative.  Non-toxic appearance. He does not appear ill. No distress.   HENT:   Head: Normocephalic and atraumatic.   Ears:   Right Ear: Hearing, tympanic membrane, external ear and ear canal normal.   Left Ear: Hearing, tympanic membrane, external ear and ear canal normal.   Nose: No mucosal edema, rhinorrhea or nasal deformity. No epistaxis. Right sinus exhibits maxillary sinus tenderness. Right sinus exhibits no frontal sinus tenderness. Left sinus exhibits maxillary sinus tenderness. Left sinus exhibits no frontal sinus tenderness.   Mouth/Throat: Uvula is midline and mucous membranes are normal. No trismus in the jaw. Normal dentition. No uvula swelling. Posterior oropharyngeal erythema present. No oropharyngeal exudate or posterior oropharyngeal edema.   Eyes: Conjunctivae and lids are normal. No scleral icterus.   Neck: Trachea normal and phonation normal. Neck supple. No edema present. No erythema present. No neck rigidity present.   Cardiovascular: Normal rate, regular rhythm, normal heart sounds and normal pulses.   Pulmonary/Chest: Effort normal and breath sounds normal. No respiratory distress. He has no decreased breath sounds. He has no rhonchi.   Abdominal: Normal appearance.   Musculoskeletal: Normal range of motion.         General: No deformity. Normal range of motion.   Neurological: He is alert and oriented to person, place, and time. He exhibits normal muscle tone. Coordination normal.   Skin: Skin is warm, dry, intact, not diaphoretic and not pale.   Psychiatric: His speech is normal and behavior is normal. Mood, judgment and thought content normal.   Nursing note and vitals reviewed.        Results for orders placed or performed in visit on 02/27/24   POCT Influenza A/B MOLECULAR   Result Value Ref Range    POC Molecular Influenza A Ag Negative Negative, Not Reported    POC Molecular Influenza B Ag Negative Negative, Not Reported     Acceptable Yes    POCT COVID-19 Rapid  Screening   Result Value Ref Range    POC Rapid COVID Negative Negative     Acceptable Yes    POCT Infectious mononucleosis antibody   Result Value Ref Range    Monospot Negative Negative     Acceptable Yes       Assessment:     1. Viral pharyngitis    2. Sore throat        Plan:     Negative for mono COVID and flu.  Will not test for strep as antibiotics did not help.  Symptoms are persistent   Will send in short course of steroids and magic mouthwash   Labs for CMV and EBV        Viral pharyngitis  -     CYTOMEGALOVIRUS (CMV) AB, IGM  -     WAYNE-PERRY VIRUS VCA, IGG  -     WAYNE-BARR VIRUS VCA, IGM  -     predniSONE (DELTASONE) 20 MG tablet; Take 2 tablets (40 mg total) by mouth once daily for 2 days, THEN 1 tablet (20 mg total) once daily for 2 days, THEN 0.5 tablets (10 mg total) once daily for 2 days.  Dispense: 7 tablet; Refill: 0  -     (Magic mouthwash) 1:1:1 diphenhydrAMINE(Benadryl) 12.5mg/5ml liq, aluminum & magnesium hydroxide-simethicone (Maalox), LIDOcaine viscous 2%; Swish and spit 10 mLs every 4 (four) hours as needed (sore throat).  Dispense: 200 mL; Refill: 0  -     Ambulatory referral/consult to ENT  -     CYTOMEGALOVIRUS ANTIBODY, IGG    Sore throat  -     POCT Influenza A/B MOLECULAR  -     POCT COVID-19 Rapid Screening  -     POCT Infectious mononucleosis antibody

## 2024-02-28 ENCOUNTER — PATIENT MESSAGE (OUTPATIENT)
Dept: OTOLARYNGOLOGY | Facility: CLINIC | Age: 34
End: 2024-02-28

## 2024-02-28 ENCOUNTER — TELEPHONE (OUTPATIENT)
Dept: PAIN MEDICINE | Facility: CLINIC | Age: 34
End: 2024-02-28
Payer: COMMERCIAL

## 2024-02-28 ENCOUNTER — OFFICE VISIT (OUTPATIENT)
Dept: OTOLARYNGOLOGY | Facility: CLINIC | Age: 34
End: 2024-02-28
Payer: COMMERCIAL

## 2024-02-28 ENCOUNTER — PATIENT MESSAGE (OUTPATIENT)
Dept: PAIN MEDICINE | Facility: CLINIC | Age: 34
End: 2024-02-28
Payer: COMMERCIAL

## 2024-02-28 VITALS — SYSTOLIC BLOOD PRESSURE: 110 MMHG | DIASTOLIC BLOOD PRESSURE: 75 MMHG | HEART RATE: 74 BPM

## 2024-02-28 DIAGNOSIS — J02.9 SORE THROAT: ICD-10-CM

## 2024-02-28 DIAGNOSIS — J02.9 VIRAL PHARYNGITIS: ICD-10-CM

## 2024-02-28 DIAGNOSIS — J02.9 PHARYNGITIS, UNSPECIFIED ETIOLOGY: Primary | ICD-10-CM

## 2024-02-28 LAB
CTP QC/QA: YES
EBV VCA IGG SER QL IA: POSITIVE
EBV VCA IGM SER QL IA: NEGATIVE
S PYO RRNA THROAT QL PROBE: NEGATIVE

## 2024-02-28 PROCEDURE — 3078F DIAST BP <80 MM HG: CPT | Mod: CPTII,S$GLB,, | Performed by: OTOLARYNGOLOGY

## 2024-02-28 PROCEDURE — 1159F MED LIST DOCD IN RCRD: CPT | Mod: CPTII,S$GLB,, | Performed by: OTOLARYNGOLOGY

## 2024-02-28 PROCEDURE — 1160F RVW MEDS BY RX/DR IN RCRD: CPT | Mod: CPTII,S$GLB,, | Performed by: OTOLARYNGOLOGY

## 2024-02-28 PROCEDURE — 4010F ACE/ARB THERAPY RXD/TAKEN: CPT | Mod: CPTII,S$GLB,, | Performed by: OTOLARYNGOLOGY

## 2024-02-28 PROCEDURE — 87880 STREP A ASSAY W/OPTIC: CPT | Mod: QW,S$GLB,, | Performed by: OTOLARYNGOLOGY

## 2024-02-28 PROCEDURE — 3074F SYST BP LT 130 MM HG: CPT | Mod: CPTII,S$GLB,, | Performed by: OTOLARYNGOLOGY

## 2024-02-28 PROCEDURE — 99999 PR PBB SHADOW E&M-EST. PATIENT-LVL III: CPT | Mod: PBBFAC,,, | Performed by: OTOLARYNGOLOGY

## 2024-02-28 PROCEDURE — 87070 CULTURE OTHR SPECIMN AEROBIC: CPT | Performed by: OTOLARYNGOLOGY

## 2024-02-28 PROCEDURE — 87147 CULTURE TYPE IMMUNOLOGIC: CPT | Performed by: OTOLARYNGOLOGY

## 2024-02-28 PROCEDURE — 99204 OFFICE O/P NEW MOD 45 MIN: CPT | Mod: 25,S$GLB,, | Performed by: OTOLARYNGOLOGY

## 2024-02-28 RX ORDER — ESCITALOPRAM OXALATE 10 MG/1
10 TABLET ORAL DAILY
Qty: 30 TABLET | Refills: 2 | Status: SHIPPED | OUTPATIENT
Start: 2024-02-28 | End: 2024-04-22

## 2024-02-28 RX ORDER — CEPHALEXIN 500 MG/1
500 CAPSULE ORAL EVERY 6 HOURS
Qty: 40 CAPSULE | Refills: 0 | Status: SHIPPED | OUTPATIENT
Start: 2024-02-28 | End: 2024-03-27 | Stop reason: ALTCHOICE

## 2024-02-28 NOTE — TELEPHONE ENCOUNTER
Contacted patient regarding cold like symptoms he has been experiencing for multiple weeks. Patient reports he was able to see an ENT at [facility] this morning who is prescribing him a course of antibiotics and ordered a throat culture (results pending.) Patient agreeable with plan to continue Lexapro 10 mg PO Daily and will call back next week to report progress at that time.     Jose M Wei MD  [facility], PGY-3

## 2024-02-28 NOTE — TELEPHONE ENCOUNTER
Attempted to contact patient regarding questions about new medication. Patient was unable to answer at this time, but voicemail was left.     Jose M Wei MD  [facility], PGY-3

## 2024-02-28 NOTE — PATIENT INSTRUCTIONS
Reviewed history, symptoms, treatment, exam findings.    Given duration of symptoms and improvement on antibiotics and return of symptoms after stopping antibiotics, discussed options of observation versus additional antibiotic treatment as well as risks and benefits.  Plan for Keflex for 10 days. Finish steroids - discussed risks and side effects - take earlier in the day with food.    Stay hydrated and rest.     Throat culture pending. Will adjust treatment pending results.    Expectations of gradual improvement discussed.  Follow up if not improved.    Otherwise, follow up with our clinic for any ear, nose or throat problems ([phone]).

## 2024-02-28 NOTE — PROGRESS NOTES
32 y/o male presents by referral of Tanya Villela NP for evaluation of sore throat, sinus symptoms and cough. Symptoms started on 2/9/24 with a bad sore throat. He had traveled abroad the week before ([place] area[place] He then developed sinus congestion and a cough. He felt badly on 2/12 & 2/13 and did not do much. He went to Urgent Care on 2/14 - dx'd with URI. Supportive treatment and prednisone for 3 days which made him feel better. Once off pred symptoms just as bad.  Went to  on 2/19/24 - COVID negative. Tx'd with Augmentin & Tessalon Perles.  Finished Augmentin two nights ago. Had felt better toward the end of last week. Since stopping Augmentin, sore throat back. Cough not very productive. Congestion and some sinus pressure. Mucus is white to clear.  Went to  yesterday and this appt was arranged. COVID, flu, Mono spot checked - all negative. EBV and CMV immunoglobulins sent.  No strep test was done at time of any of  visits. Some ear pressure pain at night.     Has had some med issues - back - due for injection.  Headaches.     PMHx, PSHx, Meds, Allergies, SocHx, FamHx reviewed in EPIC    Review of Systems     Constitutional: Positive for fatigue.  Negative for fever.      HENT: Positive for ear pain, postnasal drip, sinus infection, sinus pressure and sore throat.  Negative for ear discharge, hearing loss, runny nose, stuffy nose, trouble swallowing and voice change.      Eyes:  Negative for change in eyesight, eye drainage, eye itching and photophobia.     Respiratory:  Positive for cough. Negative for shortness of breath and wheezing.      Cardiovascular:  Negative for chest pain, foot swelling, irregular heartbeat and swollen veins.     Gastrointestinal:  Negative for abdominal pain, acid reflux, constipation, diarrhea, heartburn and vomiting.     Genitourinary: Negative for difficulty urinating.     Musc: Positive for aching muscles and back pain. Negative for aching joints.     Skin: Negative  for rash.     Allergy: Positive for seasonal allergies.     Endocrine: Positive for heat intolerance.     Neurological: Positive for headaches. Negative for dizziness.      Hematologic: Positive for swollen glands.     Psychiatric: Positive for nervous/anxious and sleep disturbance. Negative for depression.          PE: /75   Pulse 74    Gen: male, well nourished, well developed, NAD, cooperative, good historian, wearing a mask  Ears:  EAC patent & TM translucent with normal bony landmarks bilaterally, mild ME erythema, no RENUKA  Nose: external nose wnl, nasal septum some deviation, inferior turbinates edema, generalized edema of mucosa with clear to white mucus, no visible purulence or polyps  OC/OP:  MMM, tongue protrudes midline, palate raises symmetrically, tonsils 2+, mild generalized erythema, no exudate  Neck: supple, no TTP, no LAD or masses  Face:  no TTP, no erythema or flushing  Respiratory: Breathing comfortably without retractions, no wheezing or rhonchi  Skin: facial skin intact without visible lesions or flushing  Lymph: no neck lympadenopathy  Neuro:  facial movement symmetric, speech fluid, gait stable, tongue protrudes midline  Psych: alert & oriented x 3, reasonable, normal affect    Strep swab - rapid - negative  Throat culture sent    Impression:   1. Pharyngitis, unspecified etiology  cephALEXin (KEFLEX) 500 MG capsule    CULTURE, RESPIRATORY  - THROAT    magic mouthwash diphen/antac/lidoc    POCT RAPID STREP A      2. Sore throat  magic mouthwash diphen/antac/lidoc    POCT RAPID STREP A      3. Viral pharyngitis            Discussion and Plan:    Reviewed history, symptoms, treatment, exam findings.    Given duration of symptoms and improvement on antibiotics and return of symptoms after stopping antibiotics, discussed options of observation versus additional antibiotic treatment as well as risks and benefits.  Plan for Keflex for 10 days. Finish steroids - discussed risks and side  effects - take earlier in the day with food.    Stay hydrated and rest.     Throat culture pending. Will adjust treatment pending results.    Expectations of gradual improvement discussed.  Follow up if not improved.    Otherwise, follow up with our clinic for any ear, nose or throat problems ([phone]).     After patient left EBV labs returned - prior history of EBV infection. Not recent.    Also - per pt request - Magic mouthwash Rx moved from [facility] to [facility] main pharmacy location.    Parts or all of this note were created by voice recognition software; typographical errors in translating may be present.

## 2024-02-29 ENCOUNTER — TELEPHONE (OUTPATIENT)
Dept: URGENT CARE | Facility: CLINIC | Age: 34
End: 2024-02-29
Payer: COMMERCIAL

## 2024-03-01 ENCOUNTER — PATIENT MESSAGE (OUTPATIENT)
Dept: OTOLARYNGOLOGY | Facility: CLINIC | Age: 34
End: 2024-03-01
Payer: COMMERCIAL

## 2024-03-01 ENCOUNTER — TELEPHONE (OUTPATIENT)
Dept: URGENT CARE | Facility: CLINIC | Age: 34
End: 2024-03-01
Payer: COMMERCIAL

## 2024-03-01 LAB
BACTERIA THROAT CULT: ABNORMAL
CMV IGG SERPL QL IA: NORMAL
CMV IGM SERPL IA-ACNC: <8 AU/ML

## 2024-03-01 NOTE — TELEPHONE ENCOUNTER
I spoke with pt today and reviewed his viral lab tests as well as the positive throat culture for strep C. I explained this is most likely the cause of his sore throat and cough and I recommend he finish out the antibiotic and return or follow with the ENT if not improving. All questions answered

## 2024-03-04 ENCOUNTER — OFFICE VISIT (OUTPATIENT)
Dept: NEUROLOGY | Facility: CLINIC | Age: 34
End: 2024-03-04
Payer: COMMERCIAL

## 2024-03-04 VITALS
WEIGHT: 315 LBS | HEART RATE: 72 BPM | HEIGHT: 76 IN | BODY MASS INDEX: 38.36 KG/M2 | DIASTOLIC BLOOD PRESSURE: 74 MMHG | SYSTOLIC BLOOD PRESSURE: 119 MMHG

## 2024-03-04 DIAGNOSIS — G43.109 MIGRAINE AURA WITHOUT HEADACHE: Primary | ICD-10-CM

## 2024-03-04 PROCEDURE — 1160F RVW MEDS BY RX/DR IN RCRD: CPT | Mod: CPTII,S$GLB,, | Performed by: STUDENT IN AN ORGANIZED HEALTH CARE EDUCATION/TRAINING PROGRAM

## 2024-03-04 PROCEDURE — 3078F DIAST BP <80 MM HG: CPT | Mod: CPTII,S$GLB,, | Performed by: STUDENT IN AN ORGANIZED HEALTH CARE EDUCATION/TRAINING PROGRAM

## 2024-03-04 PROCEDURE — 99213 OFFICE O/P EST LOW 20 MIN: CPT | Mod: S$GLB,,, | Performed by: STUDENT IN AN ORGANIZED HEALTH CARE EDUCATION/TRAINING PROGRAM

## 2024-03-04 PROCEDURE — 4010F ACE/ARB THERAPY RXD/TAKEN: CPT | Mod: CPTII,S$GLB,, | Performed by: STUDENT IN AN ORGANIZED HEALTH CARE EDUCATION/TRAINING PROGRAM

## 2024-03-04 PROCEDURE — 3074F SYST BP LT 130 MM HG: CPT | Mod: CPTII,S$GLB,, | Performed by: STUDENT IN AN ORGANIZED HEALTH CARE EDUCATION/TRAINING PROGRAM

## 2024-03-04 PROCEDURE — 1159F MED LIST DOCD IN RCRD: CPT | Mod: CPTII,S$GLB,, | Performed by: STUDENT IN AN ORGANIZED HEALTH CARE EDUCATION/TRAINING PROGRAM

## 2024-03-04 PROCEDURE — 3008F BODY MASS INDEX DOCD: CPT | Mod: CPTII,S$GLB,, | Performed by: STUDENT IN AN ORGANIZED HEALTH CARE EDUCATION/TRAINING PROGRAM

## 2024-03-04 PROCEDURE — 99999 PR PBB SHADOW E&M-EST. PATIENT-LVL III: CPT | Mod: PBBFAC,,, | Performed by: STUDENT IN AN ORGANIZED HEALTH CARE EDUCATION/TRAINING PROGRAM

## 2024-03-04 RX ORDER — TOPIRAMATE 50 MG/1
50 TABLET, FILM COATED ORAL DAILY
Qty: 90 TABLET | Refills: 1 | Status: SHIPPED | OUTPATIENT
Start: 2024-03-04 | End: 2025-03-04

## 2024-03-04 NOTE — PATIENT INSTRUCTIONS
VISIT FOLLOW UP    It was nice to see you today.  Here is what we discussed at your visit:    Wean off lamictal - take 50 mg daily for 1 week, then stop.  In 2 weeks, start topamax 50 mg (1 tab) daily.   Follow up in 6-8 weeks    Dr. LANE's contact information: office phone [phone], or contact via [employer]

## 2024-03-04 NOTE — PROGRESS NOTES
"  [facility] - NEUROLOGY 7TH [place] [facility], [place]    Date: 3/4/24  Patient Name: Scott Gallagher   MRN: [id number]   PCP: Stalin Gordillo  Referring Provider: No ref. provider found    Assessment:   Scott Gallagher is a 33 y.o. male presenting for evaluation of persistent migraine aura without cerebral infarction.  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        Plan:     Problem List Items Addressed This Visit          Neuro    Migraine aura without headache - Primary    Current Assessment & Plan     Lamotrigine ineffective  Trial of topamax              Michelle Bruce MD    Patient note was created using MModal Dictation.  Any errors in syntax or even information may not have been identified and edited on initial review prior to signing this note.  Subjective:     Interval history 3/4/24:  No change in symptoms with lamictal.  No new neurological symptoms.   Reports brother recently had a stroke.      HPI:   Mr. Scott Gallagher is a 33 y.o. male presenting for evaluation of persistent migraine aura.      Had flu of January 2020, may have been covid unclear due to no testing.  6 days of extremely severe cough, and in the middle of that this aura started. He describes it as "pulsating", and it is roughly shaped like a kidney bean  in right upper field of vision.  It is a small speck but it pulsates "like it's folding in on itself".         Hx of stroke in 2021.  At the time, he was often having migraine headaches.  He was having symptoms of hypertension as well, was hoping that his diet was enough to control his BP and had stopped taking oral antihypertensives.  He had severe headache followed by poor coordination on left side - for example having trouble buttoning up shirt and clothes. He was found to have a right basal " ganglia bleed, has seen vascular neurology who determined likely hypertensive in nature.  Vessel imaging was unremarkable.       He has had 2 episodes of recrudescence of his left hand symptoms.  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  Associated with clumsiness and paresthesias of the left hand he had some difficulty with his speech.       In terms of migraines, he reports only 4-5 headaches in the past year.  There is a family hx of migraine in mother and one of his brothers.  Mother would have ocular auras but hers were not consistent.  No family history of stroke    PAST MEDICAL HISTORY:  Past Medical History:   Diagnosis Date    Hypertension        PAST SURGICAL HISTORY:  Past Surgical History:   Procedure Laterality Date    APPENDECTOMY  2004    EPIDURAL STEROID INJECTION N/A 7/21/2023    Procedure: L4-5 LESI (toward the left);  Surgeon: Dom Jha DO;  Location: [facility];  Service: Pain Management;  Laterality: N/A;  oral sedation    WISDOM TOOTH EXTRACTION  2011       CURRENT MEDS:  Current Outpatient Medications   Medication Sig Dispense Refill    amLODIPine (NORVASC) 10 MG tablet Take 1 tablet (10 mg total) by mouth once daily. 90 tablet 3    aspirin 81 MG Chew Take 81 mg by mouth once daily.      cephALEXin (KEFLEX) 500 MG capsule Take 1 capsule (500 mg total) by mouth every 6 (six) hours. for 10 days 40 capsule 0    EScitalopram oxalate (LEXAPRO) 10 MG tablet Take 1 tablet (10 mg total) by mouth once daily. 30 tablet 2    gabapentin (NEURONTIN) 300 MG capsule Take 1 capsule (300 mg total) by mouth every evening. 30 capsule 11    hydroCHLOROthiazide (HYDRODIURIL) 25 MG tablet Take 1 tablet (25 mg total) by mouth once daily. 30 tablet 11    lamoTRIgine (LAMICTAL) 100 MG tablet Take 100 mg by mouth once daily.      predniSONE (DELTASONE) 20 MG tablet Take 2 tablets (40 mg total) by mouth once daily for 2 days, THEN 1 tablet (20 mg total)  once daily for 2 days, THEN 0.5 tablets (10 mg total) once daily for 2 days. 7 tablet 0     No current facility-administered medications for this visit.       ALLERGIES:  Review of patient's allergies indicates:   Allergen Reactions    Anti-nausea [phosphorated carbohydrate] Hives     Pt unsure of name of Rx; states 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       FAMILY HISTORY:  Family History   Problem Relation Age of Onset    Hypertension Mother     Diabetes type II Mother     Hypertension Father     Diabetes type II Father     Hypertension Sister 28    Hypertension Brother     Hypertension Brother     Hypertension Brother     Hypertension Sister        SOCIAL HISTORY:  Social History     Tobacco Use    Smoking status: Never    Smokeless tobacco: Never   Substance Use Topics    Alcohol use: Yes    Drug use: Yes     Types: Marijuana     Comment: very rarely used, edible       Review of Systems:  12 system review of systems is negative except for the symptoms mentioned in HPI.      Objective:   There were no vitals filed for this visit.  General: NAD, well nourished   Eyes: no tearing, discharge, no erythema   ENT: moist mucous membranes of the oral cavity, nares patent    Neck: Supple, full range of motion  Cardiovascular: Warm and well perfused, pulses equal and symmetrical  Lungs: Normal work of breathing, normal chest wall excursions  Skin: No rash, lesions, or breakdown on exposed skin  Psychiatry: Mood and affect are appropriate   Abdomen: soft, non tender, non distended  Extremeties: No cyanosis, clubbing or edema.    Neurological   MENTAL STATUS: Alert and oriented to person, place, and time. Attention and concentration within normal limits. Speech without dysarthria, able to name and repeat without difficulty. Recent and remote memory within normal limits   CRANIAL NERVES: Visual fields intact. PERRL. EOMI. Facial sensation intact. Face  symmetrical. Hearing grossly intact. Full shoulder shrug bilaterally. Tongue protrudes midline   SENSORY: Sensation is intact to light touch throughout.  Joint position perception intact. Negative Romberg.   MOTOR: Normal bulk and tone. No pronator drift.  5/5 deltoid, biceps, triceps, interosseous, hand  bilaterally. 5/5 iliopsoas, knee extension/flexion, foot dorsi/plantarflexion bilaterally.    REFLEXES: Symmetric and 2+ throughout. Toes down going bilaterally.   CEREBELLAR/COORDINATION/GAIT: Gait steady with normal arm swing and stride length.  Heel to shin intact. Finger to nose intact. Normal rapid alternating movements.

## 2024-03-18 ENCOUNTER — PATIENT MESSAGE (OUTPATIENT)
Dept: PSYCHIATRY | Facility: CLINIC | Age: 34
End: 2024-03-18
Payer: COMMERCIAL

## 2024-03-18 ENCOUNTER — PATIENT MESSAGE (OUTPATIENT)
Dept: ORTHOPEDICS | Facility: CLINIC | Age: 34
End: 2024-03-18
Payer: COMMERCIAL

## 2024-03-18 RX ORDER — GABAPENTIN 300 MG/1
300 CAPSULE ORAL NIGHTLY
Qty: 30 CAPSULE | Refills: 11 | Status: SHIPPED | OUTPATIENT
Start: 2024-03-18 | End: 2024-06-10 | Stop reason: SDUPTHER

## 2024-03-27 ENCOUNTER — PATIENT MESSAGE (OUTPATIENT)
Dept: PAIN MEDICINE | Facility: CLINIC | Age: 34
End: 2024-03-27
Payer: COMMERCIAL

## 2024-03-27 ENCOUNTER — OFFICE VISIT (OUTPATIENT)
Dept: PSYCHIATRY | Facility: CLINIC | Age: 34
End: 2024-03-27
Payer: COMMERCIAL

## 2024-03-27 VITALS
WEIGHT: 315 LBS | BODY MASS INDEX: 39.06 KG/M2 | DIASTOLIC BLOOD PRESSURE: 69 MMHG | SYSTOLIC BLOOD PRESSURE: 137 MMHG | HEART RATE: 97 BPM

## 2024-03-27 DIAGNOSIS — F41.1 GENERALIZED ANXIETY DISORDER: Primary | ICD-10-CM

## 2024-03-27 PROCEDURE — 3075F SYST BP GE 130 - 139MM HG: CPT | Mod: CPTII,S$GLB,, | Performed by: STUDENT IN AN ORGANIZED HEALTH CARE EDUCATION/TRAINING PROGRAM

## 2024-03-27 PROCEDURE — 3078F DIAST BP <80 MM HG: CPT | Mod: CPTII,S$GLB,, | Performed by: STUDENT IN AN ORGANIZED HEALTH CARE EDUCATION/TRAINING PROGRAM

## 2024-03-27 PROCEDURE — 4010F ACE/ARB THERAPY RXD/TAKEN: CPT | Mod: CPTII,S$GLB,, | Performed by: STUDENT IN AN ORGANIZED HEALTH CARE EDUCATION/TRAINING PROGRAM

## 2024-03-27 PROCEDURE — 99999 PR PBB SHADOW E&M-EST. PATIENT-LVL II: CPT | Mod: PBBFAC,,, | Performed by: STUDENT IN AN ORGANIZED HEALTH CARE EDUCATION/TRAINING PROGRAM

## 2024-03-27 PROCEDURE — 99213 OFFICE O/P EST LOW 20 MIN: CPT | Mod: S$GLB,,, | Performed by: STUDENT IN AN ORGANIZED HEALTH CARE EDUCATION/TRAINING PROGRAM

## 2024-03-27 PROCEDURE — 3008F BODY MASS INDEX DOCD: CPT | Mod: CPTII,S$GLB,, | Performed by: STUDENT IN AN ORGANIZED HEALTH CARE EDUCATION/TRAINING PROGRAM

## 2024-04-10 NOTE — PROGRESS NOTES
"OUTPATIENT PSYCHIATRY FOLLOW UP VISIT    ENCOUNTER DATE:  4/9/2024  SITE:  [facility]  LENGTH OF SESSION:  25 minutes      CHIEF COMPLAINT:  anxiety    HISTORY OF PRESENTING ILLNESS:  Scott Gallagher is a 33 y.o. male with history of Generalized Anxiety Disorder who presents for follow up appointment.        Interval history as told by Patient - & or family/friend/spouse/caregiver with pts permission    Patient states he was sick with respiratory symptoms in between last appointment, but his symptoms resolved after antibiotic treatment. At first he was concerned it was side effects of Lexapro, but he does not think this is the case anymore. Patient has been dealing with chronic pain in his back and received steroid injections to help relieve his pain. Overall, he says he has felt "more anxious" since last appointment. Patient later states that he does not feel he needs to be on a medication for anxiety "all of the time," rather, he says he only needs a PRN medication for when he has stressful job situations pending. Patient would like to discontinue his Lexapro, especially due to dealing with anorgasmia side effect. He has not been able to obtain genetic testing that was ordered at last appointment. Patient agreeable with plan to obtain this lab work and states he would like to bring his partner to next appointment in order to more accurately assess his anxiety levels on a daily basis. He denies any suicidal ideation, intent, or plan.       PSYCHIATRIC REVIEW OF SYSTEMS:(none/ yes- better/worse/stable/& what symptoms)    Symptoms of Depression: stable    Symptoms of Anxiety/ panic attacks: "worse"    Symptoms of Judit/Hypomania: denies    Symptoms of psychosis: denies    Sleep: stable    Appetite: stable    Psychosocial stressors: work    Risk Parameters:  Patient reports no suicidal ideation  Patient reports no homicidal ideation  Patient reports no self-injurious behavior  Patient reports " no violent behavior    PSYCHIATRIC MED REVIEW    Current psych meds  Medication side effects:  anorgasmia  Medication compliance:  yes    Previous psych meds trials  Xanax, Gabapentin (for pain), Lamictal (for ocular aura)    PAST PSYCHIATRIC, MEDICAL, AND SOCIAL HISTORY REVIEWED  The patient's past medical, family and social history have been reviewed and updated as appropriate within the electronic medical record - see encounter notes.    MEDICAL REVIEW OF SYSTEMS:  Complete review of systems performed covering Constitutional, Musculoskeletal, Neurologic.  All systems negative.    ALL MEDICATIONS:    Current Outpatient Medications:     amLODIPine (NORVASC) 10 MG tablet, Take 1 tablet (10 mg total) by mouth once daily., Disp: 90 tablet, Rfl: 3    aspirin 81 MG Chew, Take 81 mg by mouth once daily., Disp: , Rfl:     EScitalopram oxalate (LEXAPRO) 10 MG tablet, Take 1 tablet (10 mg total) by mouth once daily., Disp: 30 tablet, Rfl: 2    gabapentin (NEURONTIN) 300 MG capsule, Take 1 capsule (300 mg total) by mouth every evening., Disp: 30 capsule, Rfl: 11    hydroCHLOROthiazide (HYDRODIURIL) 25 MG tablet, Take 1 tablet (25 mg total) by mouth once daily., Disp: 30 tablet, Rfl: 11    topiramate (TOPAMAX) 50 MG tablet, Take 1 tablet (50 mg total) by mouth once daily., Disp: 90 tablet, Rfl: 1    ALLERGIES:  Review of patient's allergies indicates:   Allergen Reactions    Anti-nausea [phosphorated carbohydrate] Hives     Pt unsure of name of Rx; states 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       RELEVANT LABS/STUDIES:    Lab Results   Component Value Date    WBC 7.01 12/05/2019    HGB 17.1 12/05/2019    HCT 46.9 12/05/2019    MCV 82 12/05/2019     12/05/2019     BMP  Lab Results   Component Value Date     02/17/2023    K 3.6 02/17/2023     02/17/2023    CO2 27 02/17/2023    BUN 11 02/17/2023    CREATININE 1.0 02/17/2023    CALCIUM 9.6  02/17/2023    ANIONGAP 8 02/17/2023    ESTGFRAFRICA >60 12/05/2019    EGFRNONAA >60 12/05/2019     Lab Results   Component Value Date    ALT 46 (H) 02/17/2023    AST 23 02/17/2023    ALKPHOS 78 02/17/2023    BILITOT 0.5 02/17/2023     Lab Results   Component Value Date    TSH 1.789 02/17/2023     Lab Results   Component Value Date    HGBA1C 5.5 02/17/2023       VITALS  Vitals:    03/27/24 1340   BP: 137/69   Pulse: 97   Weight: (!) 145.5 kg (320 lb 14.1 oz)       PHYSICAL EXAM  General: well developed, well nourished  Neurologic:   Gait: Normal   Psychomotor signs:  No involuntary movements or tremor  AIMS: none    PSYCHIATRIC EXAM:     Mental Status Exam:  Appearance: unremarkable, age appropriate  Behavior/Cooperation: normal, cooperative  Speech: normal tone, normal rate, normal pitch, normal volume  Language: uses words appropriately; NO aphasia or dysarthria  Mood: anxious  Affect: mood congruent  Thought Process: normal and logical  Thought Content: normal, no suicidality, no homicidality, delusions, or paranoia   Level of Consciousness: Alert and Oriented x3  Memory:  Intact  Attention/concentration: appropriate for age/education.   Fund of Knowledge: appears adequate  Insight: Intact  Judgment: Intact       IMPRESSION:    Scott Gallagher is a 33 y.o. male with history of Generalized Anxiety Disorder who presents for follow up appointment.    Status/Progress:  Based on the examination today, the patient's problem(s) is/are inadequately controlled.  New problems have not been presented today.     DIAGNOSES:    ICD-10-CM ICD-9-CM   1. Generalized anxiety disorder  F41.1 300.02       PLAN:  Psych Med:  Patient states he is contemplating discontinuing Lexapro, instructions for taper provided per request  Obtain genetic testing  Discussed with patient informed consent, risks vs. benefits, alternative treatments, side effect profile and the inherent unpredictability of individual responses to these treatments.  Answered any questions patient may have had. The patient expresses understanding of the above and displays the capacity to agree with this current plan     Other: none    RETURN TO CLINIC:  1 month      Jose M Wei MD  [facility], PGY-3

## 2024-04-12 DIAGNOSIS — I10 HYPERTENSION, ESSENTIAL: ICD-10-CM

## 2024-04-12 NOTE — TELEPHONE ENCOUNTER
Care Due:                  Date            Visit Type   Department     Provider  --------------------------------------------------------------------------------                                ESTABLISHED                              PATIENT -    [facility] INTERNAL  Last Visit: 02-      [employer]       Leonardo Durham  Next Visit: None Scheduled  None         None Found                                                            Last  Test          Frequency    Reason                     Performed    Due Date  --------------------------------------------------------------------------------    CMP.........  12 months..  hydroCHLOROthiazide......  02- 02-    [facility] Embedded Care Due Messages. Reference number: [id number].   4/12/2024 9:39:43 AM CDT

## 2024-04-13 RX ORDER — AMLODIPINE BESYLATE 10 MG/1
10 TABLET ORAL
Qty: 90 TABLET | Refills: 1 | Status: SHIPPED | OUTPATIENT
Start: 2024-04-13

## 2024-04-13 NOTE — TELEPHONE ENCOUNTER
Refill Routing Note   Medication(s) are not appropriate for processing by [facility] for the following reason(s):        Required labs outdated    [facility] action(s):  Defer  Approve     Requires labs : Yes             Appointments  past 12m or future 3m with PCP    Date Provider   Last Visit   11/8/2023 Stalin Gordillo MD   Next Visit   Visit date not found Stalin Gordillo MD   ED visits in past 90 days: 0        Note composed:12:15 PM 04/13/2024

## 2024-04-15 RX ORDER — OLMESARTAN MEDOXOMIL 40 MG/1
40 TABLET ORAL
Qty: 90 TABLET | Refills: 0 | Status: SHIPPED | OUTPATIENT
Start: 2024-04-15

## 2024-04-16 ENCOUNTER — TELEPHONE (OUTPATIENT)
Dept: PAIN MEDICINE | Facility: CLINIC | Age: 34
End: 2024-04-16
Payer: COMMERCIAL

## 2024-04-22 ENCOUNTER — TELEPHONE (OUTPATIENT)
Dept: PAIN MEDICINE | Facility: CLINIC | Age: 34
End: 2024-04-22
Payer: COMMERCIAL

## 2024-04-22 NOTE — PRE-PROCEDURE INSTRUCTIONS
Hello ,     Your arrival time is 1350 per pt and is roughly 1 hour before your anticipated procedure time to allow sufficient time for pre-op..  This procedure will take place at the [facility] at the corner of [facility].  It is in the [place] next to [facility].  The address is:     [address] [place] [address].  [place] [address]     After entering the building, you will proceed to the second floor where you can check in with registration. You should take any medications that you routinely take for blood pressure, heart medications, thyroid, cholesterol, etc. As directed     The fasting restrictions are dependent on whether or not you are receiving sedation.        You CANNOT drive yourself and must have a .     If you are on blood thinners, you need to follow the anticoagulation instructions that had been discussed previously.  You should only stop the blood thinners if it was approved by your primary care physician or your cardiologist.  In the event that you are not able to stop your blood thinners, a blood thinner was not listed on your medication list, or we were not able to get clearance from your cardiologist, then the procedure may have to be postponed/canceled.      IF you were told to stop your blood thinners, this is how long you should generally hold some of the more common ones.  Remember that stopping blood thinners is only necessary for certain procedures. If you are unsure of your instructions, please call us.   Aspirin - 5 days  Plavix/Clopidogrel - 7 days  Warfarin / Coumadin - 5 days  Eliquis - 3 days  Pradaxa/Dabigatran - 4 days  Xarelto/Rivaroxaban - 3 days     If you are a diabetic, do not take your medication if you will be fasting, but bring it with you.          *HOLD ALL VITAMINS, MINERALS, HERBS (INCLUDING HERBAL TEAS) AND SUPPLEMENTS  *SHOWER WITH ANTIBACTERIAL SOAP (ex:. DIAL) NIGHT BEFORE AND MORNING OF PROCEDURE  *DO NOT  APPLY ANY LOTIONS, OILS, POWDERS, PERFUME/COLOGNE, OINTMENTS, GELS, CREAMS, MAKEUP OR DEODORANT TO YOUR SKIN MORNING OF PROCEDURE  *LEAVE JEWELRY AND ANY VALUABLES AT HOME  *WEAR LOOSE COMFORTABLE CLOTHING (PREFERABLY A BUTTON UP SHIRT) - you will change into a gown for the procedure      Thank you,  Tish

## 2024-04-23 ENCOUNTER — HOSPITAL ENCOUNTER (OUTPATIENT)
Facility: HOSPITAL | Age: 34
Discharge: HOME | End: 2024-04-23
Attending: STUDENT IN AN ORGANIZED HEALTH CARE EDUCATION/TRAINING PROGRAM | Admitting: STUDENT IN AN ORGANIZED HEALTH CARE EDUCATION/TRAINING PROGRAM
Payer: COMMERCIAL

## 2024-04-23 VITALS
SYSTOLIC BLOOD PRESSURE: 113 MMHG | HEART RATE: 78 BPM | OXYGEN SATURATION: 98 % | HEIGHT: 75 IN | BODY MASS INDEX: 39.17 KG/M2 | DIASTOLIC BLOOD PRESSURE: 62 MMHG | WEIGHT: 315 LBS | RESPIRATION RATE: 14 BRPM | TEMPERATURE: 98 F

## 2024-04-23 DIAGNOSIS — M54.16 LUMBAR RADICULOPATHY: Primary | ICD-10-CM

## 2024-04-23 PROCEDURE — 62323 NJX INTERLAMINAR LMBR/SAC: CPT | Performed by: STUDENT IN AN ORGANIZED HEALTH CARE EDUCATION/TRAINING PROGRAM

## 2024-04-23 PROCEDURE — 25000003 PHARM REV CODE 250: Performed by: STUDENT IN AN ORGANIZED HEALTH CARE EDUCATION/TRAINING PROGRAM

## 2024-04-23 PROCEDURE — 63600175 PHARM REV CODE 636 W HCPCS: Performed by: STUDENT IN AN ORGANIZED HEALTH CARE EDUCATION/TRAINING PROGRAM

## 2024-04-23 PROCEDURE — 62323 NJX INTERLAMINAR LMBR/SAC: CPT | Mod: ,,, | Performed by: STUDENT IN AN ORGANIZED HEALTH CARE EDUCATION/TRAINING PROGRAM

## 2024-04-23 PROCEDURE — 25500020 PHARM REV CODE 255: Performed by: STUDENT IN AN ORGANIZED HEALTH CARE EDUCATION/TRAINING PROGRAM

## 2024-04-23 RX ORDER — LIDOCAINE HYDROCHLORIDE 20 MG/ML
INJECTION, SOLUTION EPIDURAL; INFILTRATION; INTRACAUDAL; PERINEURAL
Status: DISCONTINUED | OUTPATIENT
Start: 2024-04-23 | End: 2024-04-23 | Stop reason: HOSPADM

## 2024-04-23 RX ORDER — DEXAMETHASONE SODIUM PHOSPHATE 10 MG/ML
INJECTION INTRAMUSCULAR; INTRAVENOUS
Status: DISCONTINUED | OUTPATIENT
Start: 2024-04-23 | End: 2024-04-23 | Stop reason: HOSPADM

## 2024-04-23 RX ORDER — ALPRAZOLAM 0.5 MG/1
1 TABLET, ORALLY DISINTEGRATING ORAL ONCE AS NEEDED
Status: COMPLETED | OUTPATIENT
Start: 2024-04-23 | End: 2024-04-23

## 2024-04-23 RX ADMIN — ALPRAZOLAM 1 MG: 0.5 TABLET, ORALLY DISINTEGRATING ORAL at 02:04

## 2024-04-23 NOTE — DISCHARGE SUMMARY
[facility]  Discharge Note  Short Stay    Procedure(s) (LRB):  L4-5 Lumbar SUSANNA (toward the left - long needle) (N/A)      OUTCOME: Patient tolerated treatment/procedure well without complication and is now ready for discharge.    DISPOSITION: Home or Self Care    FINAL DIAGNOSIS:  <principal problem not specified>    FOLLOWUP: In clinic    DISCHARGE INSTRUCTIONS:  No discharge procedures on file.     TIME SPENT ON DISCHARGE: 10 minutes

## 2024-04-23 NOTE — DISCHARGE INSTRUCTIONS
[facility]  [facility]  [facility] service # [phone]    POST-PROCEDURE INSTRUCTIONS:    Today you had an injection that included a steroid medications.  The steroid may or may not have been mixed with a local anesthetic when it was injected.   If the injection was in the neck, you may feel some pressure, numbness, or slight weakness in the arm after the procedure for a short period of time (this is a normal response), if this persists for longer than 1 day please contact our office or go to the emergency room.  If the injection was in the low back, you may feel some pressure, numbness, or slight weakness in the leg after the procedure for a short period of time (this is a normal response), if this persists for longer than 1 day please contact our office or go to the emergency room.  You may get side effects from the steroid.  This is not uncommon.  Symptoms include: elevated blood sugar, elevated blood pressure, headache, flushing, nausea, insomnia.  These symptoms are transient and will resolve within 1-3 days.  If symptoms last longer than this please contact our office or head to the emergency room.  Steroid medications can take anywhere from 3-14 days to take effect (rarely longer).  You may notice that your pain worsens for a short period of time after the injection, this would not be unusual due to the pressure and trauma from the needle.    If you do not have a follow up appointment scheduled, please contact my office (or the office of the physician who referred you for the procedure) to get a post-procedure follow up scheduled 2-4 weeks after the procedure.  This can be done as a virtual visit if that is more convenient for you.      What you need to do:    Keep a record of your response to the injection you had today.    How much relief did you get?   When did the relief start and how long did it last?  Were you able to decrease the use of any of your pain  medications?  Were you able to increase your level of activity?  How long did the relief last?    What to watch out for:    If you experience any of the following symptoms after your procedure, please notify the messaging service immediately (see above for contact information):   fever (increased oral temperature)   bleeding or swelling at the injection site,    drainage, rash or redness at the injection site    possible signs of infection    increased pain at the injection site   worsening of your usual pain   severe headache   new or worsening numbness    new arm and/or leg weakness, or    changes in bowel and/or bladder function: urinating or defecating on yourself and not knowing that you did it.    PLEASE FOLLOW ALL INSTRUCTIONS CAREFULLY     Do not engage in strenuous activity (e.g., lifting or pushing heavy objects or repeated bending) for 24 hours.     Do not take a bath, swim or use Jacuzzi for 24 hours after procedure. (A shower is fine).   Remove any Band-Aids when you get home.    Use cold/ice, as needed for comfort.  We recommend the use of cold therapy alternating on for 20 minutes, off for 20 minutes.    Do not apply direct heat (heating pad or heat packs) to the injection site for 24 hours.     Resume your usual medications, unless instructed otherwise by your Pain Physician.     If you are on warfarin (Coumadin) or other blood thinner, resume this medication as instructed by your prescribing Physician.    IF AT ANY POINT YOU ARE VERY CONCERNED ABOUT YOUR SYMPTOMS, PLEASE GO TO THE EMERGENCY ROOM.    If you develop worsening pain, weakness, numbness, lose bowel or bladder control (i.e., having an accident where you did not even know you had to go to the bathroom and suddenly noticed you soiled yourself), saddle anesthesia (a loss of sensation restricted to the area of the buttocks, anus and between the legs -- i.e., those parts of your body that would touch a saddle if you were sitting on one) you  need to go immediately to the emergency department for evaluation and treatment.    ----------------------------------------------------------------------------------------------------------------------------------------------------------------  If you received Sedation please read the following instructions:  POST SEDATION INSTRUCTIONS    Today you received intravenous medication (also known as sedation) that was used to help you relax and/or decrease discomfort during your procedure. This medication will be acting in your body for the next 24 hours, so you might feel a little tired or sleepy. This feeling will slowly wear off.   Common side effects associated with these medications include: drowsiness, dizziness, sleepiness, confusion, feeling excited, difficulty remembering things, lack of steadiness with walking or balance, loss of fine muscle control, slowed reflexes, difficulty focusing, and blurred vision.  Some over-the-counter and prescription medications (e.g., muscle relaxants, opioids, mood-altering medications, sedatives/hypnotics, antihistamines) can interact with the intravenous medication you received and cause an increased risk of the side effects listed above in addition to other potentially life threatening side effects. Use extreme caution if you are taking such medications, and consult with your Pain Physician or prescribing physician if you have any questions.  For the next 12-24 hours:    DO NOT--Drive a car, operate machinery or power tools   DO NOT--Drink any alcoholic beverages (not even beer), they may dangerously increase the risk of side effects.    DO NOT--Make any important legal or business decisions or sign important documents.  We advise you to have someone to assist you at home. Move slowly and carefully. Do not make sudden changes in position. Be aware of dizziness or light-headedness and move accordingly.   If you seek medical treatment within 24 hours, let the nurse or doctor  caring for you know that you have received the above medications. If you have any questions or concerns related to your sedation or treatment today please contact us.

## 2024-04-23 NOTE — H&P
HPI  Patient presenting for Procedure(s) (LRB):  L4-5 Lumbar SUSANNA (toward the left - long needle) (N/A)     Patient on Anti-coagulation No    No health changes since previous encounter    Past Medical History:   Diagnosis Date    Hypertension      Past Surgical History:   Procedure Laterality Date    APPENDECTOMY  2004    EPIDURAL STEROID INJECTION N/A 7/21/2023    Procedure: L4-5 LESI (toward the left);  Surgeon: Dom Jha DO;  Location: [facility];  Service: Pain Management;  Laterality: N/A;  oral sedation    WISDOM TOOTH EXTRACTION  2011     Review of patient's allergies indicates:   Allergen Reactions    Anti-nausea [phosphorated carbohydrate] Hives     Pt unsure of name of Rx; states 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      Current Facility-Administered Medications   Medication Dose Route Frequency Provider Last Rate Last Admin    alprazolam ODT dissolvable tablet 1 mg  1 mg Oral Once PRN Dom Jha DO           PMHx, PSHx, Allergies, Medications reviewed in epic    ROS negative except pain complaints in HPI    OBJECTIVE:    There were no vitals taken for this visit.    PHYSICAL EXAMINATION:    GENERAL: Well appearing, in no acute distress, alert and oriented x3.  PSYCH:  Mood and affect appropriate.  SKIN: Skin color, texture, turgor normal, no rashes or lesions which will impact the procedure.  CV: RRR with palpation of the radial artery.  PULM: No evidence of respiratory difficulty, symmetric chest rise. Clear to auscultation.  NEURO: Cranial nerves grossly intact.    Plan:    Proceed with procedure as planned Procedure(s) (LRB):  L4-5 Lumbar SUSANNA (toward the left - long needle) (N/A)    Dom Fulton  04/23/2024

## 2024-04-23 NOTE — OP NOTE
"PROCEDURE:  LUMBAR L4-5 INTERLAMINAR EPIDURAL STEROID INJECTION    Patient Name: Scott Gallagher  MRN: [id number]  DATE OF PROCEDURE: 04/23/2024    INJECTION # 2    DIAGNOSIS: Lumbar Radiculopathy  CPT CODE: 59580      POSTPROCEDURE DIAGNOSIS: Same    PHYSICIAN: Dom Jha DO  NEEDLE TYPE: - 20G 5" Touhy Needle  MEDICATIONS INJECTED: 4cc mixture of 3cc Normal Saline + 10mg Dexamethasone (10mg/ml)  CONTRAST: Omni 300  LOSS OF RESISTANCE DEPTH: 10 cm    Sedation Medications - Oral Sedation     Estimated Blood Loss - <2ml  Drains: None  Specimens Removed: None  Urine Output - Not Measured  Complications: None  Outcome: Good    Informed Consent:  The patient's condition and proposed procedures, risks, and alternatives were discussed with the patient or responsible party.  The patient's / responsible party's questions were answered.   The patient / responsible party appeared to understand and chose to proceed.  Informed consent was obtained.  After obtaining written consent, an IV hep lock was placed. (See nurses notes for details).     Procedure in Detail:  The patient was taken back to the OR suite and placed in a prone position. The skin overlying the injection site was prepped and draped in an aseptic fashion. The target injection site (see above) was identified with fluoroscopy and marked.     Procedural Pause:  A procedural pause verifying correct patient, medical record number, allergies, medications to be administered, current vital signs, and surgical site was performed immediately prior to beginning the procedure.    The skin and subcutaneous tissue overlying the target site of injection for the L4-5 epidural steroid injection was/were anesthetized using 4 mL of 2% lidocaine with a 25-gauge, 1½-inch needle.  The above noted Tuohy needle was advanced under fluoroscopic guidance towards the epidural space. Lateral fluoroscopic imaging was used to confirm depth. The epidural space was identified using a " loss of resistance to saline technique. (See above for loss of resistance depth). A microbore extension tubing was attached to the needle to minimize any movement of the needle during injection or syringe change.  After negative aspiration for heme or CSF, 1ml of contrast was injected to confirm placement and no intrathecal or vascular spread.  After repeat negative aspiration for heme or CSF, the above noted steroid solution was slowly injected in increments. The needle was then retracted approximately assisted and the needle track was flushed with 0.5 ml of Lidocaine 2% to clear the needle prior to removal. The Tuohy needle was then removed.     The heart rate, pulse oximetry, and blood pressure were continuously monitored throughout the procedure.  The prpocedure was well tolerated. He was carefully escorted to the recovery room in stable condition. Patient was monitored by RN for recovery period.  The patient will be contacted in the next few days to determine extent of relief.  Patient was given post procedure and discharge instructions to follow at home.  The patient was discharged in a stable condition.    Note Electronically Signed By:  Dom Fulton

## 2024-04-26 ENCOUNTER — OFFICE VISIT (OUTPATIENT)
Dept: GASTROENTEROLOGY | Facility: CLINIC | Age: 34
End: 2024-04-26
Payer: COMMERCIAL

## 2024-04-26 ENCOUNTER — LAB VISIT (OUTPATIENT)
Dept: LAB | Facility: HOSPITAL | Age: 34
End: 2024-04-26
Attending: INTERNAL MEDICINE
Payer: COMMERCIAL

## 2024-04-26 VITALS
BODY MASS INDEX: 39.17 KG/M2 | HEART RATE: 79 BPM | WEIGHT: 315 LBS | HEIGHT: 75 IN | DIASTOLIC BLOOD PRESSURE: 85 MMHG | SYSTOLIC BLOOD PRESSURE: 132 MMHG

## 2024-04-26 DIAGNOSIS — R74.01 ELEVATED ALT MEASUREMENT: ICD-10-CM

## 2024-04-26 DIAGNOSIS — K92.1 HEMATOCHEZIA: ICD-10-CM

## 2024-04-26 DIAGNOSIS — R10.9 RECURRENT ABDOMINAL PAIN: Primary | ICD-10-CM

## 2024-04-26 DIAGNOSIS — R10.9 RECURRENT ABDOMINAL PAIN: ICD-10-CM

## 2024-04-26 DIAGNOSIS — R19.8 IRREGULAR BOWEL HABITS: ICD-10-CM

## 2024-04-26 LAB
ALBUMIN SERPL BCP-MCNC: 4 G/DL (ref 3.5–5.2)
ALP SERPL-CCNC: 73 U/L (ref 55–135)
ALT SERPL W/O P-5'-P-CCNC: 43 U/L (ref 10–44)
ANION GAP SERPL CALC-SCNC: 10 MMOL/L (ref 8–16)
AST SERPL-CCNC: 18 U/L (ref 10–40)
BASOPHILS # BLD AUTO: 0.05 K/UL (ref 0–0.2)
BASOPHILS NFR BLD: 0.7 % (ref 0–1.9)
BILIRUB SERPL-MCNC: 0.4 MG/DL (ref 0.1–1)
BUN SERPL-MCNC: 16 MG/DL (ref 6–20)
CALCIUM SERPL-MCNC: 9.3 MG/DL (ref 8.7–10.5)
CHLORIDE SERPL-SCNC: 106 MMOL/L (ref 95–110)
CO2 SERPL-SCNC: 24 MMOL/L (ref 23–29)
CREAT SERPL-MCNC: 1 MG/DL (ref 0.5–1.4)
CRP SERPL-MCNC: 0.7 MG/L (ref 0–8.2)
DIFFERENTIAL METHOD BLD: ABNORMAL
EOSINOPHIL # BLD AUTO: 0.2 K/UL (ref 0–0.5)
EOSINOPHIL NFR BLD: 2.4 % (ref 0–8)
ERYTHROCYTE [DISTWIDTH] IN BLOOD BY AUTOMATED COUNT: 13.2 % (ref 11.5–14.5)
ERYTHROCYTE [SEDIMENTATION RATE] IN BLOOD BY PHOTOMETRIC METHOD: <2 MM/HR (ref 0–23)
EST. GFR  (NO RACE VARIABLE): >60 ML/MIN/1.73 M^2
FERRITIN SERPL-MCNC: 470 NG/ML (ref 20–300)
GLUCOSE SERPL-MCNC: 138 MG/DL (ref 70–110)
HAV IGG SER QL IA: NORMAL
HBV CORE AB SERPL QL IA: NORMAL
HBV CORE IGM SERPL QL IA: NORMAL
HBV SURFACE AB SER-ACNC: 539.53 MIU/ML
HBV SURFACE AB SER-ACNC: REACTIVE M[IU]/ML
HBV SURFACE AG SERPL QL IA: NORMAL
HCT VFR BLD AUTO: 46 % (ref 40–54)
HCV AB SERPL QL IA: NORMAL
HGB BLD-MCNC: 15.8 G/DL (ref 14–18)
IGA SERPL-MCNC: 233 MG/DL (ref 40–350)
IGG SERPL-MCNC: 697 MG/DL (ref 650–1600)
IGM SERPL-MCNC: 56 MG/DL (ref 50–300)
IMM GRANULOCYTES # BLD AUTO: 0.07 K/UL (ref 0–0.04)
IMM GRANULOCYTES NFR BLD AUTO: 1 % (ref 0–0.5)
IRON SERPL-MCNC: 145 UG/DL (ref 45–160)
LYMPHOCYTES # BLD AUTO: 3.1 K/UL (ref 1–4.8)
LYMPHOCYTES NFR BLD: 45.8 % (ref 18–48)
MCH RBC QN AUTO: 30.8 PG (ref 27–31)
MCHC RBC AUTO-ENTMCNC: 34.3 G/DL (ref 32–36)
MCV RBC AUTO: 90 FL (ref 82–98)
MONOCYTES # BLD AUTO: 0.5 K/UL (ref 0.3–1)
MONOCYTES NFR BLD: 7.7 % (ref 4–15)
NEUTROPHILS # BLD AUTO: 2.9 K/UL (ref 1.8–7.7)
NEUTROPHILS NFR BLD: 42.4 % (ref 38–73)
NRBC BLD-RTO: 0 /100 WBC
PLATELET # BLD AUTO: 225 K/UL (ref 150–450)
PMV BLD AUTO: 11 FL (ref 9.2–12.9)
POTASSIUM SERPL-SCNC: 3.8 MMOL/L (ref 3.5–5.1)
PROT SERPL-MCNC: 7 G/DL (ref 6–8.4)
RBC # BLD AUTO: 5.13 M/UL (ref 4.6–6.2)
SATURATED IRON: 41 % (ref 20–50)
SODIUM SERPL-SCNC: 140 MMOL/L (ref 136–145)
TOTAL IRON BINDING CAPACITY: 354 UG/DL (ref 250–450)
TRANSFERRIN SERPL-MCNC: 239 MG/DL (ref 200–375)
TSH SERPL DL<=0.005 MIU/L-ACNC: 2.58 UIU/ML (ref 0.4–4)
WBC # BLD AUTO: 6.77 K/UL (ref 3.9–12.7)

## 2024-04-26 PROCEDURE — 4010F ACE/ARB THERAPY RXD/TAKEN: CPT | Mod: CPTII,S$GLB,, | Performed by: INTERNAL MEDICINE

## 2024-04-26 PROCEDURE — 82784 ASSAY IGA/IGD/IGG/IGM EACH: CPT | Mod: 59 | Performed by: INTERNAL MEDICINE

## 2024-04-26 PROCEDURE — 3079F DIAST BP 80-89 MM HG: CPT | Mod: CPTII,S$GLB,, | Performed by: INTERNAL MEDICINE

## 2024-04-26 PROCEDURE — 84443 ASSAY THYROID STIM HORMONE: CPT | Performed by: INTERNAL MEDICINE

## 2024-04-26 PROCEDURE — 86364 TISS TRNSGLTMNASE EA IG CLAS: CPT | Performed by: INTERNAL MEDICINE

## 2024-04-26 PROCEDURE — 80053 COMPREHEN METABOLIC PANEL: CPT | Performed by: INTERNAL MEDICINE

## 2024-04-26 PROCEDURE — 87340 HEPATITIS B SURFACE AG IA: CPT | Performed by: INTERNAL MEDICINE

## 2024-04-26 PROCEDURE — 36415 COLL VENOUS BLD VENIPUNCTURE: CPT | Performed by: INTERNAL MEDICINE

## 2024-04-26 PROCEDURE — 85025 COMPLETE CBC W/AUTO DIFF WBC: CPT | Performed by: INTERNAL MEDICINE

## 2024-04-26 PROCEDURE — 86038 ANTINUCLEAR ANTIBODIES: CPT | Performed by: INTERNAL MEDICINE

## 2024-04-26 PROCEDURE — 99204 OFFICE O/P NEW MOD 45 MIN: CPT | Mod: S$GLB,,, | Performed by: INTERNAL MEDICINE

## 2024-04-26 PROCEDURE — 86803 HEPATITIS C AB TEST: CPT | Performed by: INTERNAL MEDICINE

## 2024-04-26 PROCEDURE — 86706 HEP B SURFACE ANTIBODY: CPT | Mod: 91 | Performed by: INTERNAL MEDICINE

## 2024-04-26 PROCEDURE — 86705 HEP B CORE ANTIBODY IGM: CPT | Performed by: INTERNAL MEDICINE

## 2024-04-26 PROCEDURE — 3008F BODY MASS INDEX DOCD: CPT | Mod: CPTII,S$GLB,, | Performed by: INTERNAL MEDICINE

## 2024-04-26 PROCEDURE — 86140 C-REACTIVE PROTEIN: CPT | Performed by: INTERNAL MEDICINE

## 2024-04-26 PROCEDURE — 1160F RVW MEDS BY RX/DR IN RCRD: CPT | Mod: CPTII,S$GLB,, | Performed by: INTERNAL MEDICINE

## 2024-04-26 PROCEDURE — 85652 RBC SED RATE AUTOMATED: CPT | Performed by: INTERNAL MEDICINE

## 2024-04-26 PROCEDURE — 3075F SYST BP GE 130 - 139MM HG: CPT | Mod: CPTII,S$GLB,, | Performed by: INTERNAL MEDICINE

## 2024-04-26 PROCEDURE — 82728 ASSAY OF FERRITIN: CPT | Performed by: INTERNAL MEDICINE

## 2024-04-26 PROCEDURE — 86704 HEP B CORE ANTIBODY TOTAL: CPT | Performed by: INTERNAL MEDICINE

## 2024-04-26 PROCEDURE — 86790 VIRUS ANTIBODY NOS: CPT | Performed by: INTERNAL MEDICINE

## 2024-04-26 PROCEDURE — 99999 PR PBB SHADOW E&M-EST. PATIENT-LVL IV: CPT | Mod: PBBFAC,,, | Performed by: INTERNAL MEDICINE

## 2024-04-26 PROCEDURE — 83540 ASSAY OF IRON: CPT | Performed by: INTERNAL MEDICINE

## 2024-04-26 PROCEDURE — 1159F MED LIST DOCD IN RCRD: CPT | Mod: CPTII,S$GLB,, | Performed by: INTERNAL MEDICINE

## 2024-04-26 NOTE — PROGRESS NOTES
[facility] Consultation Note    Reason for Consult:    Chief Complaint   Patient presents with    GI Problem       PCP:   Stalin Gordillo    Referring MD:  Stalin Gordillo Md  [address]  [facility] [address]  [place] [address]      HPI:  Scott Gallagher is a 33 y.o. male here for evaluation of recurrent abdominal pain an irregular bowel movements.  He is new to our clinic.  He is referred by his PCP this past September, but he had canceled several appointments due to work.  No evaluation thus far.  He has had ongoing symptoms for at least a couple of years, but these have become more frequent in the past year.  The episodes have been occurring about every 2-3 months.  The episodes were associated with abdominal pain that has occurred in different parts of the abdomen, sometimes upper abdomen, and sometimes left lower or right lower abdomen.  It is not usually always in the same place.  He has not been able to identify a clear trigger.  The last episode occurred during [place] time, and this was the most severe.  Severe episodes are associated with nausea.  He denies heartburn or reflux.  He has irregular bowel patterns with intermittent episodes of either diarrhea or constipation.  He has bowel movement every 1-2 days.  He denies significant abdominal pain or bloating with bowel movements.  During times of diarrhea, he has seen mucus as well as small amounts of red blood.  He will either use an over-the-counter laxative or antidiarrheal based on symptoms.  He denies NSAID use other than a baby aspirin every day for history of a stroke.  His mother also had GI problems, but no specific diagnosis.  Her problems are slightly different than his.          ROS:  Constitutional: No fevers, chills, No weight loss, normal appetite  GI: see HPI        Medical History:  has a past medical history of Hypertension.    Surgical History:  has a past surgical history that includes  "Appendectomy (2004); [place] tooth extraction (2011); Epidural steroid injection (N/A, 7/21/2023); and Epidural steroid injection into lumbar spine (N/A, 4/23/2024).    Family History: family history includes Diabetes type II in his father and mother; Hypertension in his brother, brother, brother, father, mother, and sister; Hypertension (age of onset: 28) in his sister.    Social History:  reports that he has never smoked. He has never used smokeless tobacco. He reports current alcohol use. He reports current drug use. Drug: Marijuana.    Review of patient's allergies indicates:   Allergen Reactions    Anti-nausea [phosphorated carbohydrate] Hives     Pt unsure of name of Rx; states 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       Prior to Admission medications    Medication Sig Start Date End Date Taking? Authorizing Provider   amLODIPine (NORVASC) 10 MG tablet TAKE ONE TABLET BY MOUTH ONE TIME DAILY 4/13/24   Stalin Gordillo MD   aspirin 81 MG Chew Take 81 mg by mouth once daily.    Provider, Historical   gabapentin (NEURONTIN) 300 MG capsule Take 1 capsule (300 mg total) by mouth every evening. 3/18/24 3/18/25  Lupe Rodriguez PA-C   hydroCHLOROthiazide (HYDRODIURIL) 25 MG tablet Take 1 tablet (25 mg total) by mouth once daily. 4/18/23 4/23/24  Stalin Gordillo MD   olmesartan (BENICAR) 40 MG tablet TAKE ONE TABLET BY MOUTH ONE TIME DAILY 4/15/24   Stalin Gordillo MD   topiramate (TOPAMAX) 50 MG tablet Take 1 tablet (50 mg total) by mouth once daily. 3/4/24 3/4/25  SaminaisiarMaria Luisa rice MD       Objective Findings:  Vital Signs:  /85   Pulse 79   Ht 6' 3" (1.905 m)   Wt (!) 149.8 kg (330 lb 4 oz)   BMI 41.28 kg/m²   Body mass index is 41.28 kg/m².      Physical Exam:  General Appearance:  Well appearing in no acute distress, appears stated age  Head:  Normocephalic, atraumatic  Eyes:  No scleral " icterus or pallor, EOMI        Labs:  Lab Results   Component Value Date    WBC 7.01 12/05/2019    HGB 17.1 12/05/2019    HCT 46.9 12/05/2019    MCV 82 12/05/2019    RDW 12.4 12/05/2019     12/05/2019    GRAN 4.4 12/05/2019    GRAN 62.9 12/05/2019    LYMPH 1.9 12/05/2019    LYMPH 27.0 12/05/2019    MONO 0.5 12/05/2019    MONO 6.8 12/05/2019    EOS 0.2 12/05/2019    BASO 0.03 12/05/2019     Lab Results   Component Value Date     02/17/2023    K 3.6 02/17/2023     02/17/2023    CO2 27 02/17/2023     (H) 02/17/2023    BUN 11 02/17/2023    CREATININE 1.0 02/17/2023    CALCIUM 9.6 02/17/2023    PROT 7.2 02/17/2023    ALBUMIN 4.3 02/17/2023    BILITOT 0.5 02/17/2023    ALKPHOS 78 02/17/2023    AST 23 02/17/2023    ALT 46 (H) 02/17/2023                     Assessment:  Scott Gallagher is a 33 y.o. male with:  1. Recurrent abdominal pain    2. Irregular bowel habits    3. Hematochezia    4. Elevated ALT measurement      Recurrent episodes of abdominal pain that occur in different parts of the abdomen without clear triggers.  Episodes are occurring every 2-3 months, and may be associated with nausea.  He has irregular bowel patterns with constipation and diarrhea, and has seen small amounts of red blood in the stool.  I note an elevated ALT measurement in February of last year of unclear significance.  Other than a baby aspirin, he has no other NSAID use.  No other family history of diagnosed GI disorders, but his mother has had gastrointestinal problems.      Recommendations/Plan:  1. Blood test as noted below  2. I will arrange for an ultrasound of the abdomen  3. I will arrange for an EGD and colonoscopy      Follow-up pending results      Order summary:  Orders Placed This Encounter    US Abdomen Complete    CBC Auto Differential    Comprehensive Metabolic Panel    C-reactive protein    RUSSEL Screen w/Reflex    Ferritin    Iron and TIBC    Immunoglobulins (IgG, IgA, IgM) Quantitative    Hepatitis A  antibody, IgG    Hepatitis B Core Antibody, IgM    Hepatitis B Core Antibody, Total    Hepatitis B Surface Ab, Qualitative    Hepatitis B Surface Antigen    Hepatitis C Antibody    Tissue Transglutaminase, IgA    TSH    ESR (SEDIMENTATION RATE, MANUAL)         Thank you so much for allowing me to participate in the care of Scott Valentine MD

## 2024-04-26 NOTE — PROGRESS NOTES
"GENERAL GI PATIENT INTAKE:    COVID symptoms in the last 7 days (runny nose, sore throat, congestion, cough, fever): No  PCP: Stalin Gordillo  If not PCP-  number given to establish [phone]: N/A    ALLERGIES REVIEWED:  Yes    CHIEF COMPLAINT:    Chief Complaint   Patient presents with    GI Problem       VITAL SIGNS:  /85   Pulse 79   Ht 6' 3" (1.905 m)   Wt (!) 149.8 kg (330 lb 4 oz)   BMI 41.28 kg/m²      Change in medical, surgical, family or social history: No      REVIEWED MEDICATION LIST RECONCILED INCLUDING ABOVE MEDS:  Yes     "

## 2024-04-29 LAB
ANA SER QL IF: NORMAL
TTG IGA SER-ACNC: 0.5 U/ML

## 2024-04-30 ENCOUNTER — PATIENT MESSAGE (OUTPATIENT)
Dept: GASTROENTEROLOGY | Facility: CLINIC | Age: 34
End: 2024-04-30
Payer: COMMERCIAL

## 2024-05-02 ENCOUNTER — HOSPITAL ENCOUNTER (OUTPATIENT)
Dept: RADIOLOGY | Facility: HOSPITAL | Age: 34
Discharge: HOME | End: 2024-05-02
Attending: INTERNAL MEDICINE
Payer: COMMERCIAL

## 2024-05-02 ENCOUNTER — PATIENT MESSAGE (OUTPATIENT)
Dept: ORTHOPEDICS | Facility: CLINIC | Age: 34
End: 2024-05-02
Payer: COMMERCIAL

## 2024-05-02 DIAGNOSIS — R10.9 RECURRENT ABDOMINAL PAIN: ICD-10-CM

## 2024-05-02 DIAGNOSIS — R74.01 ELEVATED ALT MEASUREMENT: ICD-10-CM

## 2024-05-02 PROCEDURE — 76700 US EXAM ABDOM COMPLETE: CPT | Mod: TC

## 2024-05-02 PROCEDURE — 76700 US EXAM ABDOM COMPLETE: CPT | Mod: 26,,, | Performed by: RADIOLOGY

## 2024-05-03 ENCOUNTER — TELEPHONE (OUTPATIENT)
Dept: ENDOSCOPY | Facility: HOSPITAL | Age: 34
End: 2024-05-03
Payer: COMMERCIAL

## 2024-05-03 NOTE — TELEPHONE ENCOUNTER
"Contacted the patient to schedule an endoscopy procedure(s) EGD/Colonoscopy. The patient did not answer the call and left a voice message requesting a call back.        Rahul Valentine MD  [facility]  Procedure: EGD/Colonoscopy    Diagnosis: Generalized abdominal pain, irregular bowel habits, blood in stool    Procedure Timin-12 weeks    *If within 4 weeks selected, please gen as high priority*    *If greater than 12 weeks, please select "5-12 weeks" and delay sending until 3 months prior to requested date*    Provider: Myself    Location: No Preference    Additional Scheduling Information: No scheduling concerns    Prep Specifications:Standard prep    Is the patient taking a GLP-1 Agonist:no    Have you attached a patient to this message: yes  "

## 2024-05-08 ENCOUNTER — LAB VISIT (OUTPATIENT)
Dept: LAB | Facility: HOSPITAL | Age: 34
End: 2024-05-08
Payer: COMMERCIAL

## 2024-05-08 ENCOUNTER — OFFICE VISIT (OUTPATIENT)
Dept: PSYCHIATRY | Facility: CLINIC | Age: 34
End: 2024-05-08
Payer: COMMERCIAL

## 2024-05-08 VITALS
WEIGHT: 315 LBS | BODY MASS INDEX: 39.98 KG/M2 | HEART RATE: 80 BPM | DIASTOLIC BLOOD PRESSURE: 70 MMHG | SYSTOLIC BLOOD PRESSURE: 128 MMHG

## 2024-05-08 DIAGNOSIS — F41.1 GENERALIZED ANXIETY DISORDER: Primary | ICD-10-CM

## 2024-05-08 DIAGNOSIS — F41.1 GENERALIZED ANXIETY DISORDER: ICD-10-CM

## 2024-05-08 PROCEDURE — 99999 PR PBB SHADOW E&M-EST. PATIENT-LVL III: CPT | Mod: PBBFAC,,, | Performed by: STUDENT IN AN ORGANIZED HEALTH CARE EDUCATION/TRAINING PROGRAM

## 2024-05-08 PROCEDURE — 36415 COLL VENOUS BLD VENIPUNCTURE: CPT | Performed by: STUDENT IN AN ORGANIZED HEALTH CARE EDUCATION/TRAINING PROGRAM

## 2024-05-08 PROCEDURE — 3008F BODY MASS INDEX DOCD: CPT | Mod: CPTII,S$GLB,, | Performed by: STUDENT IN AN ORGANIZED HEALTH CARE EDUCATION/TRAINING PROGRAM

## 2024-05-08 PROCEDURE — 1160F RVW MEDS BY RX/DR IN RCRD: CPT | Mod: CPTII,S$GLB,, | Performed by: STUDENT IN AN ORGANIZED HEALTH CARE EDUCATION/TRAINING PROGRAM

## 2024-05-08 PROCEDURE — 99213 OFFICE O/P EST LOW 20 MIN: CPT | Mod: S$GLB,,, | Performed by: STUDENT IN AN ORGANIZED HEALTH CARE EDUCATION/TRAINING PROGRAM

## 2024-05-08 PROCEDURE — 3074F SYST BP LT 130 MM HG: CPT | Mod: CPTII,S$GLB,, | Performed by: STUDENT IN AN ORGANIZED HEALTH CARE EDUCATION/TRAINING PROGRAM

## 2024-05-08 PROCEDURE — 4010F ACE/ARB THERAPY RXD/TAKEN: CPT | Mod: CPTII,S$GLB,, | Performed by: STUDENT IN AN ORGANIZED HEALTH CARE EDUCATION/TRAINING PROGRAM

## 2024-05-08 PROCEDURE — 3078F DIAST BP <80 MM HG: CPT | Mod: CPTII,S$GLB,, | Performed by: STUDENT IN AN ORGANIZED HEALTH CARE EDUCATION/TRAINING PROGRAM

## 2024-05-08 PROCEDURE — 1159F MED LIST DOCD IN RCRD: CPT | Mod: CPTII,S$GLB,, | Performed by: STUDENT IN AN ORGANIZED HEALTH CARE EDUCATION/TRAINING PROGRAM

## 2024-05-08 NOTE — PROGRESS NOTES
"OUTPATIENT PSYCHIATRY FOLLOW UP VISIT    ENCOUNTER DATE:  5/8/2024  SITE:  [facility]  LENGTH OF SESSION:  20 minutes      CHIEF COMPLAINT:  anxiety    HISTORY OF PRESENTING ILLNESS:  Scott Gallagher is a 33 y.o. male with history of Generalized Anxiety Disorder who presents for follow up appointment.        Interval history as told by Patient - & or family/friend/spouse/caregiver with pts permission    Patient is accompanied by his partner, Faheem, at today's appointment. Patient reports feeling "much better" since discontinuing Lexapro. His partner agrees that the patient has appeared "more like himself" since discontinuation. Patient states he has been taking Gabapentin 300 mg for pain relief in his back and feels this medication has helped improve his mood and anxiety. Patient;'s partner expresses concerns about patient's baseline level of anxiety saying that while the patient does handle his stress well, things could be better than they are. Patient has increased his frequency of therapy visits to biweekly. He has not obtained lab work for pharmacogenetic testing yet, but plans to do so today after appointment. Patient agreeable with plan to continue therapy and to wait for genetic testing in the mean time before starting another medication for anxiety. He denies any other questions or concerns at this time.       PSYCHIATRIC REVIEW OF SYSTEMS:(none/ yes- better/worse/stable/& what symptoms)    Symptoms of Depression: stable    Symptoms of Anxiety/ panic attacks: "not bad, not great"    Symptoms of Judit/Hypomania: denies    Symptoms of psychosis: denies    Sleep: stable    Appetite: stable    Psychosocial stressors: work    Risk Parameters:  Patient reports no suicidal ideation  Patient reports no homicidal ideation  Patient reports no self-injurious behavior  Patient reports no violent behavior    PSYCHIATRIC MED REVIEW    Current psych meds  Medication side effects:  anorgasmia on " Lexapro  Medication compliance:  n/a    Previous psych meds trials  Xanax, Gabapentin (currently for pain), Lamictal (for ocular aura), Lexapro    PAST PSYCHIATRIC, MEDICAL, AND SOCIAL HISTORY REVIEWED  The patient's past medical, family and social history have been reviewed and updated as appropriate within the electronic medical record - see encounter notes.    MEDICAL REVIEW OF SYSTEMS:  Complete review of systems performed covering Constitutional, Musculoskeletal, Neurologic.  All systems negative.    ALL MEDICATIONS:    Current Outpatient Medications:     amLODIPine (NORVASC) 10 MG tablet, TAKE ONE TABLET BY MOUTH ONE TIME DAILY, Disp: 90 tablet, Rfl: 1    aspirin 81 MG Chew, Take 81 mg by mouth once daily., Disp: , Rfl:     gabapentin (NEURONTIN) 300 MG capsule, Take 1 capsule (300 mg total) by mouth every evening., Disp: 30 capsule, Rfl: 11    hydroCHLOROthiazide (HYDRODIURIL) 25 MG tablet, Take 1 tablet (25 mg total) by mouth once daily., Disp: 30 tablet, Rfl: 11    olmesartan (BENICAR) 40 MG tablet, TAKE ONE TABLET BY MOUTH ONE TIME DAILY, Disp: 90 tablet, Rfl: 0    topiramate (TOPAMAX) 50 MG tablet, Take 1 tablet (50 mg total) by mouth once daily., Disp: 90 tablet, Rfl: 1    ALLERGIES:  Review of patient's allergies indicates:   Allergen Reactions    Anti-nausea [phosphorated carbohydrate] Hives     Pt unsure of name of Rx; states 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       RELEVANT LABS/STUDIES:    Lab Results   Component Value Date    WBC 6.77 04/26/2024    HGB 15.8 04/26/2024    HCT 46.0 04/26/2024    MCV 90 04/26/2024     04/26/2024     BMP  Lab Results   Component Value Date     04/26/2024    K 3.8 04/26/2024     04/26/2024    CO2 24 04/26/2024    BUN 16 04/26/2024    CREATININE 1.0 04/26/2024    CALCIUM 9.3 04/26/2024    ANIONGAP 10 04/26/2024    ESTGFRAFRICA >60 12/05/2019    EGFRNONAA >60 12/05/2019     Lab  Results   Component Value Date    ALT 43 04/26/2024    AST 18 04/26/2024    ALKPHOS 73 04/26/2024    BILITOT 0.4 04/26/2024     Lab Results   Component Value Date    TSH 2.583 04/26/2024     Lab Results   Component Value Date    HGBA1C 5.5 02/17/2023       VITALS  Vitals:    05/08/24 1422   BP: 128/70   Pulse: 80   Weight: (!) 145.1 kg (319 lb 14.2 oz)       PHYSICAL EXAM  General: well developed, well nourished  Neurologic:   Gait: Normal   Psychomotor signs:  No involuntary movements or tremor  AIMS: none    PSYCHIATRIC EXAM:     Mental Status Exam:  Appearance: unremarkable, age appropriate  Behavior/Cooperation: normal, cooperative  Speech: normal tone, normal rate, normal pitch, normal volume  Language: uses words appropriately; NO aphasia or dysarthria  Mood: anxious  Affect: mood congruent  Thought Process: normal and logical  Thought Content: normal, no suicidality, no homicidality, delusions, or paranoia   Level of Consciousness: Alert and Oriented x3  Memory:  Intact  Attention/concentration: appropriate for age/education.   Fund of Knowledge: appears adequate  Insight: Intact  Judgment: Intact       IMPRESSION:    Scott Gallagher is a 33 y.o. male with history of Generalized Anxiety Disorder who presents for follow up appointment.    Status/Progress:  Based on the examination today, the patient's problem(s) is/are inadequately controlled.  New problems have not been presented today.     DIAGNOSES:    ICD-10-CM ICD-9-CM   1. Generalized anxiety disorder  F41.1 300.02       PLAN:  Psych Med:  Patient has discontinued Lexapro without any withdrawal symptoms, will hold off starting new medications at this time  Obtain genetic testing, lab ordered 2/7/24  Continue therapy sessions, currently biweekly  Discussed with patient informed consent, risks vs. benefits, alternative treatments, side effect profile and the inherent unpredictability of individual responses to these treatments. Answered any questions patient  may have had. The patient expresses understanding of the above and displays the capacity to agree with this current plan     Other: follow up on results of genetic testing    RETURN TO CLINIC:  1 month      Jose M Wei MD  [facility], PGY-3

## 2024-05-08 NOTE — PROGRESS NOTES
STAFF COMMENTS: I have discussed pt with Dr. Wei and reviewed the history and exam. I agree and concur with the assessment and plan.

## 2024-05-09 ENCOUNTER — PATIENT MESSAGE (OUTPATIENT)
Dept: INTERNAL MEDICINE | Facility: CLINIC | Age: 34
End: 2024-05-09
Payer: COMMERCIAL

## 2024-05-15 LAB
ONEOME COMMENT: NORMAL
ONEOME METHOD: NORMAL

## 2024-05-16 ENCOUNTER — PATIENT MESSAGE (OUTPATIENT)
Dept: PSYCHIATRY | Facility: CLINIC | Age: 34
End: 2024-05-16
Payer: COMMERCIAL

## 2024-05-16 RX ORDER — OLMESARTAN MEDOXOMIL, AMLODIPINE AND HYDROCHLOROTHIAZIDE TABLET 40/10/25 MG 40; 10; 25 MG/1; MG/1; MG/1
1 TABLET ORAL DAILY
Qty: 90 TABLET | Refills: 2 | Status: SHIPPED | OUTPATIENT
Start: 2024-05-16 | End: 2025-05-16

## 2024-05-16 NOTE — TELEPHONE ENCOUNTER
No care due was identified.  [facility] Embedded Care Due Messages. Reference number: [id number].   5/16/2024 12:18:05 PM CDT

## 2024-05-28 ENCOUNTER — PATIENT MESSAGE (OUTPATIENT)
Dept: ORTHOPEDICS | Facility: CLINIC | Age: 34
End: 2024-05-28
Payer: COMMERCIAL

## 2024-05-30 ENCOUNTER — PATIENT MESSAGE (OUTPATIENT)
Dept: ADMINISTRATIVE | Facility: HOSPITAL | Age: 34
End: 2024-05-30
Payer: COMMERCIAL

## 2024-06-05 ENCOUNTER — OFFICE VISIT (OUTPATIENT)
Dept: PSYCHIATRY | Facility: CLINIC | Age: 34
End: 2024-06-05
Payer: COMMERCIAL

## 2024-06-05 VITALS
WEIGHT: 315 LBS | SYSTOLIC BLOOD PRESSURE: 107 MMHG | HEART RATE: 69 BPM | BODY MASS INDEX: 39.83 KG/M2 | DIASTOLIC BLOOD PRESSURE: 62 MMHG

## 2024-06-05 DIAGNOSIS — F41.0 GENERALIZED ANXIETY DISORDER WITH PANIC ATTACKS: Primary | ICD-10-CM

## 2024-06-05 DIAGNOSIS — F41.1 GENERALIZED ANXIETY DISORDER WITH PANIC ATTACKS: Primary | ICD-10-CM

## 2024-06-05 PROCEDURE — 3074F SYST BP LT 130 MM HG: CPT | Mod: CPTII,S$GLB,, | Performed by: STUDENT IN AN ORGANIZED HEALTH CARE EDUCATION/TRAINING PROGRAM

## 2024-06-05 PROCEDURE — 3008F BODY MASS INDEX DOCD: CPT | Mod: CPTII,S$GLB,, | Performed by: STUDENT IN AN ORGANIZED HEALTH CARE EDUCATION/TRAINING PROGRAM

## 2024-06-05 PROCEDURE — 99213 OFFICE O/P EST LOW 20 MIN: CPT | Mod: S$GLB,,, | Performed by: STUDENT IN AN ORGANIZED HEALTH CARE EDUCATION/TRAINING PROGRAM

## 2024-06-05 PROCEDURE — 99999 PR PBB SHADOW E&M-EST. PATIENT-LVL III: CPT | Mod: PBBFAC,,, | Performed by: STUDENT IN AN ORGANIZED HEALTH CARE EDUCATION/TRAINING PROGRAM

## 2024-06-05 PROCEDURE — 1160F RVW MEDS BY RX/DR IN RCRD: CPT | Mod: CPTII,S$GLB,, | Performed by: STUDENT IN AN ORGANIZED HEALTH CARE EDUCATION/TRAINING PROGRAM

## 2024-06-05 PROCEDURE — 1159F MED LIST DOCD IN RCRD: CPT | Mod: CPTII,S$GLB,, | Performed by: STUDENT IN AN ORGANIZED HEALTH CARE EDUCATION/TRAINING PROGRAM

## 2024-06-05 PROCEDURE — 3078F DIAST BP <80 MM HG: CPT | Mod: CPTII,S$GLB,, | Performed by: STUDENT IN AN ORGANIZED HEALTH CARE EDUCATION/TRAINING PROGRAM

## 2024-06-05 PROCEDURE — 4010F ACE/ARB THERAPY RXD/TAKEN: CPT | Mod: CPTII,S$GLB,, | Performed by: STUDENT IN AN ORGANIZED HEALTH CARE EDUCATION/TRAINING PROGRAM

## 2024-06-05 RX ORDER — ALPRAZOLAM 0.5 MG/1
0.5 TABLET ORAL DAILY PRN
Qty: 10 TABLET | Refills: 0 | Status: SHIPPED | OUTPATIENT
Start: 2024-06-05

## 2024-06-05 NOTE — PROGRESS NOTES
OUTPATIENT PSYCHIATRY FOLLOW UP VISIT    ENCOUNTER DATE:  6/5/2024  SITE:  [facility], [facility]  LENGTH OF SESSION:  20 minutes      CHIEF COMPLAINT:  anxiety    HISTORY OF PRESENTING ILLNESS:  Scott Gallagher is a 33 y.o. male with history of Generalized Anxiety Disorder who presents for follow up appointment.        Interval history as told by Patient - & or family/friend/spouse/caregiver with pts permission    Patient states he has been doing well since last appointment. Denies any new symptoms of anxiety or depression. Reviewed pharmacogenetic testing with patient and all questions addressed at this time. Patient has been taking an increased dose of Gabapentin (600 mg) and believes this may be helping with his baseline anxiety in addition to his pain. 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 Reviewed potential risks and benefits of benzodiazepine medications. Instructed patient to take Alprazolam approximately 30 minutes prior to audition. He expressed his understanding and agreement with this plan. He denies any other questions or concerns at this time.       PSYCHIATRIC REVIEW OF SYSTEMS:(none/ yes- better/worse/stable/& what symptoms)    Symptoms of Depression: stable    Symptoms of Anxiety/ panic attacks: improved    Symptoms of Judit/Hypomania: denies    Symptoms of psychosis: denies    Sleep: stable    Appetite: stable    Psychosocial stressors: work    Risk Parameters:  Patient reports no suicidal ideation  Patient reports no homicidal ideation  Patient reports no self-injurious behavior  Patient reports no violent behavior    PSYCHIATRIC MED REVIEW    Current psych meds  Medication side effects:  anorgasmia on Lexapro  Medication compliance:  n/a    Previous psych meds trials  Xanax,  Gabapentin (currently for pain), Lamictal (for ocular aura), Lexapro    PAST PSYCHIATRIC, MEDICAL, AND SOCIAL HISTORY REVIEWED  The patient's past medical, family and social history have been reviewed and updated as appropriate within the electronic medical record - see encounter notes.    MEDICAL REVIEW OF SYSTEMS:  Complete review of systems performed covering Constitutional, Musculoskeletal, Neurologic.  All systems negative.    ALL MEDICATIONS:    Current Outpatient Medications:     ALPRAZolam (XANAX) 0.5 MG tablet, Take 1 tablet (0.5 mg total) by mouth daily as needed for Anxiety. 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 as needed for anxiety, Disp: 10 tablet, Rfl: 0    amLODIPine (NORVASC) 10 MG tablet, TAKE ONE TABLET BY MOUTH ONE TIME DAILY, Disp: 90 tablet, Rfl: 1    aspirin 81 MG Chew, Take 81 mg by mouth once daily., Disp: , Rfl:     gabapentin (NEURONTIN) 300 MG capsule, Take 1 capsule (300 mg total) by mouth every evening., Disp: 30 capsule, Rfl: 11    hydroCHLOROthiazide (HYDRODIURIL) 25 MG tablet, Take 1 tablet (25 mg total) by mouth once daily., Disp: 30 tablet, Rfl: 11    olmesartan (BENICAR) 40 MG tablet, TAKE ONE TABLET BY MOUTH ONE TIME DAILY, Disp: 90 tablet, Rfl: 0    olmesartan-amLODIPin-hcthiazid 40-10-25 mg Tab, Take 1 tablet by mouth once daily., Disp: 90 tablet, Rfl: 2    topiramate (TOPAMAX) 50 MG tablet, Take 1 tablet (50 mg total) by mouth once daily., Disp: 90 tablet, Rfl: 1    ALLERGIES:  Review of patient's allergies indicates:   Allergen Reactions    Anti-nausea [phosphorated carbohydrate] Hives     Pt unsure of name of Rx; states 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       RELEVANT LABS/STUDIES:    Lab Results   Component Value Date    WBC 6.77 04/26/2024    HGB 15.8 04/26/2024    HCT 46.0 04/26/2024    MCV 90 04/26/2024     04/26/2024     BMP  Lab Results   Component Value Date     04/26/2024    K 3.8  04/26/2024     04/26/2024    CO2 24 04/26/2024    BUN 16 04/26/2024    CREATININE 1.0 04/26/2024    CALCIUM 9.3 04/26/2024    ANIONGAP 10 04/26/2024    ESTGFRAFRICA >60 12/05/2019    EGFRNONAA >60 12/05/2019     Lab Results   Component Value Date    ALT 43 04/26/2024    AST 18 04/26/2024    ALKPHOS 73 04/26/2024    BILITOT 0.4 04/26/2024     Lab Results   Component Value Date    TSH 2.583 04/26/2024     Lab Results   Component Value Date    HGBA1C 5.5 02/17/2023       VITALS  Vitals:    06/05/24 1445   BP: 107/62   Pulse: 69   Weight: (!) 144.6 kg (318 lb 10.8 oz)       PHYSICAL EXAM  General: well developed, well nourished  Neurologic:   Gait: Normal   Psychomotor signs:  No involuntary movements or tremor  AIMS: none    PSYCHIATRIC EXAM:     Mental Status Exam:  Appearance: unremarkable, age appropriate  Behavior/Cooperation: normal, cooperative  Speech: normal tone, normal rate, normal pitch, normal volume  Language: uses words appropriately; NO aphasia or dysarthria  Mood: steady  Affect: mood congruent  Thought Process: normal and logical  Thought Content: normal, no suicidality, no homicidality, delusions, or paranoia   Level of Consciousness: Alert and Oriented x3  Memory:  Intact  Attention/concentration: appropriate for age/education.   Fund of Knowledge: appears adequate  Insight: Intact  Judgment: Intact       IMPRESSION:    Scott Gallagher is a 33 y.o. male with history of Generalized Anxiety Disorder who presents for follow up appointment.    Status/Progress:  Based on the examination today, the patient's problem(s) is/are resolving.  New problems have not been presented today.     DIAGNOSES:    ICD-10-CM ICD-9-CM   1. Generalized anxiety disorder with panic attacks  F41.1 300.02    F41.0 300.01       PLAN:  Psych Med:  Continue PRN Alprazolam 0.5 mg PO Daily for breakthrough anxiety  Continue therapy sessions, currently biweekly  Discussed with patient informed consent, risks vs. benefits,  alternative treatments, side effect profile and the inherent unpredictability of individual responses to these treatments. Answered any questions patient may have had. The patient expresses understanding of the above and displays the capacity to agree with this current plan     Other: none    RETURN TO CLINIC:  1 month, patient aware of resident transition and will follow up with new provider      Jose M Wei MD  [facility], PGY-3

## 2024-06-10 RX ORDER — GABAPENTIN 300 MG/1
300 CAPSULE ORAL 3 TIMES DAILY
Qty: 90 CAPSULE | Refills: 11 | Status: SHIPPED | OUTPATIENT
Start: 2024-06-10 | End: 2024-06-10 | Stop reason: SDUPTHER

## 2024-06-10 RX ORDER — GABAPENTIN 300 MG/1
300-600 CAPSULE ORAL 3 TIMES DAILY
Qty: 180 CAPSULE | Refills: 0 | Status: SHIPPED | OUTPATIENT
Start: 2024-06-10 | End: 2024-07-10

## 2024-07-05 ENCOUNTER — TELEPHONE (OUTPATIENT)
Dept: ENDOSCOPY | Facility: HOSPITAL | Age: 34
End: 2024-07-05
Payer: COMMERCIAL

## 2024-07-05 NOTE — TELEPHONE ENCOUNTER
"----- Message from Yvonne Macario sent at 2024  1:22 PM CDT -----  Regarding: FW: EGD/Colonoscopy    ----- Message -----  From: Rahul Valentine MD  Sent: 2024   9:50 AM CDT  To: [facility] Patients  Subject: EGD/Colonoscopy                                  Procedure: EGD/Colonoscopy    Diagnosis: Generalized abdominal pain, irregular bowel habits, blood in stool    Procedure Timin-12 weeks    #If within 4 weeks selected, please gen as high priority#    #If greater than 12 weeks, please select "5-12 weeks" and delay sending until 3 months prior to requested date#     Provider: Myself    Location: No Preference    Additional Scheduling Information: No scheduling concerns    Prep Specifications:Standard prep    Is the patient taking a GLP-1 Agonist:no    Have you attached a patient to this message: yes  "

## 2024-07-09 ENCOUNTER — TELEPHONE (OUTPATIENT)
Dept: ENDOSCOPY | Facility: HOSPITAL | Age: 34
End: 2024-07-09
Payer: COMMERCIAL

## 2024-07-09 NOTE — TELEPHONE ENCOUNTER
Contacted the patient to schedule an endoscopy procedure(s) 7/5 7/9. The patient did not answer the call and left a voice message requesting a call back.

## 2024-07-09 NOTE — TELEPHONE ENCOUNTER
"----- Message from Yvonne Macario sent at 2024  1:22 PM CDT -----  Regarding: FW: EGD/Colonoscopy    ----- Message -----  From: Rahul Valentine MD  Sent: 2024   9:50 AM CDT  To: [facility] Patients  Subject: EGD/Colonoscopy                                  Procedure: EGD/Colonoscopy    Diagnosis: Generalized abdominal pain, irregular bowel habits, blood in stool    Procedure Timin-12 weeks    #If within 4 weeks selected, please gen as high priority#    #If greater than 12 weeks, please select "5-12 weeks" and delay sending until 3 months prior to requested date#     Provider: Myself    Location: No Preference    Additional Scheduling Information: No scheduling concerns    Prep Specifications:Standard prep    Is the patient taking a GLP-1 Agonist:no    Have you attached a patient to this message: yes  "

## 2024-07-25 ENCOUNTER — PATIENT MESSAGE (OUTPATIENT)
Dept: SURGERY | Facility: CLINIC | Age: 34
End: 2024-07-25
Payer: COMMERCIAL

## 2024-07-26 ENCOUNTER — OFFICE VISIT (OUTPATIENT)
Dept: SURGERY | Facility: CLINIC | Age: 34
End: 2024-07-26
Payer: COMMERCIAL

## 2024-07-26 VITALS
HEART RATE: 89 BPM | SYSTOLIC BLOOD PRESSURE: 115 MMHG | HEIGHT: 75 IN | WEIGHT: 315 LBS | RESPIRATION RATE: 19 BRPM | OXYGEN SATURATION: 95 % | DIASTOLIC BLOOD PRESSURE: 75 MMHG | BODY MASS INDEX: 39.17 KG/M2

## 2024-07-26 DIAGNOSIS — K61.1 PERIRECTAL ABSCESS: Primary | ICD-10-CM

## 2024-07-26 PROCEDURE — 99999 PR PBB SHADOW E&M-EST. PATIENT-LVL III: CPT | Mod: PBBFAC,,, | Performed by: SURGERY

## 2024-07-26 RX ORDER — AMOXICILLIN AND CLAVULANATE POTASSIUM 875; 125 MG/1; MG/1
1 TABLET, FILM COATED ORAL EVERY 12 HOURS
Qty: 20 TABLET | Refills: 0 | Status: SHIPPED | OUTPATIENT
Start: 2024-07-26

## 2024-07-26 NOTE — PROGRESS NOTES
Colon & Rectal Surgery Clinic Follow Up    HPI:   Scott Gallagher is a 34 y.o. male who presents for follow up    2/24/23 Office visit for hemorrhoids and anal pain     Interval history:   Was travelling when he developed perianal pain and swelling.  Presented to an urgent care in [place], abscess drained spontaneously while there with relief of symptoms.  Returned home and had recurrence of symptoms.  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       Objective:   Vitals:    07/26/24 1005   BP: 115/75   Pulse: 89   Resp: 19        Physical Exam   Gen: well developed male, NAD  HEENT: normocephalic, atraumatic, PERRL, EOMI   CV: RRR, no murmurs  Resp: nonlabored, CTAB   Abd: soft, NTND   MSK: no gross deformities, no cyanosis or edema   Anorectal: erythema and fluctuance in the left anterolateral position, rectal drainage of purulent fluid with palpation     See procedure note for I&D       Assessment and Plan:   Scott Gallagher  is a 34 y.o. male who presents for evaluation of recurrent perirectal abscess     - abscess drained in clinic  - 10 days of augmentin sent to pharmacy  - consider fibercon 2 tabs twice daily for loose bowel movements  - tylenol 1000 mg every 8 hours for pain  - warm soaks three times daily for comfort   - follow up in 2 weeks       Sun Finley MD  Staff Surgeon   Colon & Rectal Surgery

## 2024-07-26 NOTE — PROCEDURES
"Scott Gallagher is a 34 y.o. male patient.    Pulse: 89 (07/26/24 1005)  Resp: 19 (07/26/24 1005)  BP: 115/75 (07/26/24 1005)  SpO2: 95 % (07/26/24 1005)  Weight: (!) 143.1 kg (315 lb 7.7 oz) (07/26/24 1005)  Height: 6' 3" (190.5 cm) (07/26/24 1005)       Incision and Drainage    Date/Time: 7/26/2024 4:00 PM    Performed by: Sun Finley MD  Authorized by: Sun Finley MD    Consent Done?:  Yes (Verbal)    Type:  Abscess  Body area:  Anogenital  Anesthesia:  Local infiltration  Local anesthetic: Lidocaine 1% without epinephrine  Scalpel size:  11  Incision type:  Single straight  Complexity:  Simple  Drainage:  Pus  Wound treatment:  Incision and deloculation      Cruciate incision made with drainage of copious amount of purulent fluid.      Follow up in 2 weeks.     Sun Finley MD  Staff Surgeon   Colon & Rectal Surgery    "
13.7   7.40  )-----------( 235      ( 24 Mar 2024 09:43 )             39.6     03-24    144  |  103  |  16  ----------------------------<  137<H>  3.7   |  29  |  1.21    Ca    10.0      24 Mar 2024 01:03    TPro  7.2  /  Alb  4.6  /  TBili  0.4  /  DBili  x   /  AST  33  /  ALT  59<H>  /  AlkPhos  87  03-24      ekg NSR RBBB new TWI II, aVF, V2, V3

## 2024-08-07 ENCOUNTER — OFFICE VISIT (OUTPATIENT)
Dept: PSYCHIATRY | Facility: CLINIC | Age: 34
End: 2024-08-07
Payer: COMMERCIAL

## 2024-08-07 VITALS
BODY MASS INDEX: 39.45 KG/M2 | WEIGHT: 315 LBS | DIASTOLIC BLOOD PRESSURE: 73 MMHG | SYSTOLIC BLOOD PRESSURE: 117 MMHG | HEART RATE: 87 BPM

## 2024-08-07 DIAGNOSIS — F41.1 GENERALIZED ANXIETY DISORDER WITH PANIC ATTACKS: Primary | ICD-10-CM

## 2024-08-07 DIAGNOSIS — F41.0 GENERALIZED ANXIETY DISORDER WITH PANIC ATTACKS: Primary | ICD-10-CM

## 2024-08-07 PROCEDURE — 99999 PR PBB SHADOW E&M-EST. PATIENT-LVL II: CPT | Mod: PBBFAC,,,

## 2024-08-08 ENCOUNTER — TELEPHONE (OUTPATIENT)
Dept: SURGERY | Facility: CLINIC | Age: 34
End: 2024-08-08
Payer: COMMERCIAL

## 2024-08-12 ENCOUNTER — OFFICE VISIT (OUTPATIENT)
Dept: SURGERY | Facility: CLINIC | Age: 34
End: 2024-08-12
Payer: COMMERCIAL

## 2024-08-12 ENCOUNTER — TELEPHONE (OUTPATIENT)
Dept: ENDOSCOPY | Facility: HOSPITAL | Age: 34
End: 2024-08-12
Payer: COMMERCIAL

## 2024-08-12 VITALS
SYSTOLIC BLOOD PRESSURE: 120 MMHG | OXYGEN SATURATION: 96 % | BODY MASS INDEX: 39.12 KG/M2 | DIASTOLIC BLOOD PRESSURE: 84 MMHG | RESPIRATION RATE: 14 BRPM | WEIGHT: 314.63 LBS | HEIGHT: 75 IN | HEART RATE: 64 BPM

## 2024-08-12 DIAGNOSIS — K61.1 PERIRECTAL ABSCESS: Primary | ICD-10-CM

## 2024-08-12 PROCEDURE — 4010F ACE/ARB THERAPY RXD/TAKEN: CPT | Mod: CPTII,S$GLB,, | Performed by: SURGERY

## 2024-08-12 PROCEDURE — 3074F SYST BP LT 130 MM HG: CPT | Mod: CPTII,S$GLB,, | Performed by: SURGERY

## 2024-08-12 PROCEDURE — 3079F DIAST BP 80-89 MM HG: CPT | Mod: CPTII,S$GLB,, | Performed by: SURGERY

## 2024-08-12 PROCEDURE — 99213 OFFICE O/P EST LOW 20 MIN: CPT | Mod: S$GLB,,, | Performed by: SURGERY

## 2024-08-12 PROCEDURE — 99999 PR PBB SHADOW E&M-EST. PATIENT-LVL III: CPT | Mod: PBBFAC,,, | Performed by: SURGERY

## 2024-08-12 PROCEDURE — 3008F BODY MASS INDEX DOCD: CPT | Mod: CPTII,S$GLB,, | Performed by: SURGERY

## 2024-08-12 NOTE — TELEPHONE ENCOUNTER
Contacted patient in regards to inbasket message received about scheduling a procedure. LVM with direct line.

## 2024-08-12 NOTE — TELEPHONE ENCOUNTER
----- Message from Yvonne Macario sent at 7/29/2024  2:03 PM CDT -----    ----- Message -----  From: Clint Chung MA  Sent: 7/29/2024  10:13 AM CDT  To: [facility] Endoscopy Schedulers    Patient is calling to schedule procedure please call patient

## 2024-08-12 NOTE — PROGRESS NOTES
Colon & Rectal Surgery Clinic Follow Up    HPI:   Scott Gallagher is a 34 y.o. male who presents for follow up of perianal abscess    7/26/24 I&D of perirectal abscess    Interval history:   Drained consistently for several days after I&D.  Now intermittent bloody drainage.  Having some pressure that is relieved with drainage.        Objective:   Vitals:    08/12/24 0804   BP: 120/84   Pulse: 64   Resp: 14        Physical Exam   Gen: well developed male, NAD  HEENT: normocephalic, atraumatic, PERRL, EOMI   CV: RRR, no murmurs  Resp: nonlabored, CTAB   Abd: soft, NTND   MSK: no gross deformities, no cyanosis or edema   Anorectal: scar in the left anterolateral position with focal defect with scant drainage, no fluctuance or erythema      Assessment and Plan:   Scott Gallagher  is a 34 y.o. 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      We reviewed the cryptoglandular etiology of anorectal abscess and fistula-in-ano using visual diagrams. We reviewed that ~20% of patients who have had an abscess will develop a fistula.     We reviewed treatment options:  (1) Clinical observation- this would avoid the possible need for multiple procedures, but will a low likelihood that the fistula wound resolve.  (2) Exam under anesthesia- at this time, we reviewed that if <30% of the sphincter were involved I would perform a fistulotomy, which is associated with >90% healing.  If >30% of the sphincter were involved , I would place a seton.  This would remain in place for at least 3 months, and would then require a second procedure for repair (advancement flap or ligation of intersphincteric fistula [LIFT]).  We reviewed that success rates for these procedures are ~60-70%.     Discussed that if the fistula becomes more symptomatic or  bothersome, could place a seton to prevent abscess and this would temporize the need for definitive surgery until he moves.  Currently with minimal symptoms, will follow up if abscess recurs or symptoms worsen.        Sun Finley MD  Staff Surgeon   Colon & Rectal Surgery

## 2024-08-14 ENCOUNTER — PATIENT MESSAGE (OUTPATIENT)
Dept: NEUROLOGY | Facility: CLINIC | Age: 34
End: 2024-08-14
Payer: COMMERCIAL

## 2024-08-15 ENCOUNTER — TELEPHONE (OUTPATIENT)
Dept: ENDOSCOPY | Facility: HOSPITAL | Age: 34
End: 2024-08-15
Payer: COMMERCIAL

## 2024-08-15 VITALS — HEIGHT: 76 IN | BODY MASS INDEX: 38.36 KG/M2 | WEIGHT: 315 LBS

## 2024-08-15 DIAGNOSIS — Z12.11 COLON CANCER SCREENING: ICD-10-CM

## 2024-08-15 DIAGNOSIS — R10.84 GENERALIZED ABDOMINAL PAIN: Primary | ICD-10-CM

## 2024-08-15 DIAGNOSIS — R19.8 IRREGULAR BOWEL HABITS: ICD-10-CM

## 2024-08-15 DIAGNOSIS — K92.1 BLOOD IN STOOL: ICD-10-CM

## 2024-08-15 RX ORDER — POLYETHYLENE GLYCOL 3350, SODIUM SULFATE ANHYDROUS, SODIUM BICARBONATE, SODIUM CHLORIDE, POTASSIUM CHLORIDE 236; 22.74; 6.74; 5.86; 2.97 G/4L; G/4L; G/4L; G/4L; G/4L
4 POWDER, FOR SOLUTION ORAL ONCE
Qty: 4000 ML | Refills: 0 | Status: SHIPPED | OUTPATIENT
Start: 2024-08-15 | End: 2024-08-15

## 2024-08-15 NOTE — TELEPHONE ENCOUNTER
"From: Rahul Valentine MD  Sent: 2024   9:50 AM CDT  To: [facility]osc[facility] Patients  Subject: EGD/Colonoscopy                                   Procedure: EGD/Colonoscopy     Diagnosis: Generalized abdominal pain, irregular bowel habits, blood in stool     Procedure Timin-12 weeks     #If within 4 weeks selected, please gen as high priority#     #If greater than 12 weeks, please select "5-12 weeks" and delay sending until 3 months prior to requested date#      Provider: Myself     Location: No Preference     Additional Scheduling Information: No scheduling concerns     Prep Specifications:Standard prep     Is the patient taking a GLP-1 Agonist:no     Have you attached a patient to this message: yes  "

## 2024-08-15 NOTE — TELEPHONE ENCOUNTER
Spoke to patient to schedule procedure(s) Colonoscopy/EGD       Physician to perform procedure(s) Dr. GIL Valentine  Date of Procedure (s) 9/5/24  Arrival Time 1:00 PM  Time of Procedure(s) 2:00 PM   Location of Procedure(s) [facility] [address] [facility]  Type of Rx Prep sent to patient: PEG  Instructions provided to patient via [facility]    Patient was informed on the following information and verbalized understanding. Screening questionnaire reviewed with patient and complete. If procedure requires anesthesia, a responsible adult needs to be present to accompany the patient home, patient cannot drive after receiving anesthesia. Appointment details are tentative, especially check-in time. Patient will receive a prep-op call 7 days prior to confirm check-in time for procedure. If applicable the patient should contact their pharmacy to verify Rx for procedure prep is ready for pick-up. Patient was advised to call the scheduling department at [phone] if pharmacy states no Rx is available. Patient was advised to call the endoscopy scheduling department if any questions or concerns arise.      [facility] Scheduling Department

## 2024-08-19 ENCOUNTER — HOSPITAL ENCOUNTER (OUTPATIENT)
Dept: RADIOLOGY | Facility: HOSPITAL | Age: 34
Discharge: HOME | End: 2024-08-19
Attending: ORTHOPAEDIC SURGERY
Payer: COMMERCIAL

## 2024-08-19 ENCOUNTER — PATIENT MESSAGE (OUTPATIENT)
Dept: ORTHOPEDICS | Facility: CLINIC | Age: 34
End: 2024-08-19
Payer: COMMERCIAL

## 2024-08-19 DIAGNOSIS — M54.16 LUMBAR RADICULOPATHY, CHRONIC: Primary | ICD-10-CM

## 2024-08-19 DIAGNOSIS — M54.16 LUMBAR RADICULOPATHY, CHRONIC: ICD-10-CM

## 2024-08-19 PROCEDURE — 72148 MRI LUMBAR SPINE W/O DYE: CPT | Mod: TC

## 2024-08-19 PROCEDURE — 72148 MRI LUMBAR SPINE W/O DYE: CPT | Mod: 26,,, | Performed by: RADIOLOGY

## 2024-08-19 RX ORDER — METHYLPREDNISOLONE 4 MG/1
TABLET ORAL
Qty: 1 EACH | Refills: 0 | Status: SHIPPED | OUTPATIENT
Start: 2024-08-19 | End: 2024-09-09

## 2024-08-19 NOTE — PROGRESS NOTES
Spoke with pt virtually. Pt was last seen 2/22/24 and continues to have low back pain. Pt has tried home exercises for 8 weeks with worsening of pain as well as gabapentin and multiple ESIs. Pain is 8/10. I provided the patient with a home exercise program. It is the [employer] spine conditioning program. Exercises include head rolls, kneeling back extension, sitting rotation stretch, modified seated side straddle, knee to chest, bird dog, plank, modified seated plank, hip bridges, abdominal bracing, and abdominal crunch. Pt completed each exercise daily for one hour with worsening of pain. Will obtain new MRI to further evaluate and call with results.

## 2024-08-21 NOTE — PROGRESS NOTES
Established Patient - Audio Only Telehealth Visit     The patient location is: home  The chief complaint leading to consultation is: MRI results  Visit type: Virtual visit with audio only (telephone)  Total time spent with patient: 10 min       The reason for the audio only service rather than synchronous audio and video virtual visit was related to technical difficulties or patient preference/necessity.     Each patient to whom I provide medical services by telemedicine is:  (1) informed of the relationship between the physician and patient and the respective role of any other health care provider with respect to management of the patient; and (2) notified that they may decline to receive medical services by telemedicine and may withdraw from such care at any time. Patient verbally consented to receive this service via voice-only telephone call.    DATE: 8/21/2024  PATIENT: Scott Gallagher    Attending Physician: Raleigh Bond M.D.    HISTORY:  Scott Gallagher is a 34 y.o. male who returns to me today for MRI results.  He was last seen by me 2/27/2023.  Today he is doing well but notes he continues to have low back and left leg pain. Pt has tried home exercises for 8 weeks with worsening of pain as well as gabapentin and multiple ESIs. Pain is 8/10. I provided the patient with a home exercise program. It is the [employer] spine conditioning program. Exercises include head rolls, kneeling back extension, sitting rotation stretch, modified seated side straddle, knee to chest, bird dog, plank, modified seated plank, hip bridges, abdominal bracing, and abdominal crunch. Pt completed each exercise daily for one hour with worsening of pain. he has had two L4-5 ESIs on 7/21/23 and 4/23/24 with 50-75% relief. He is miserable and wants surgery    The Patient denies myelopathic symptoms such as handwriting changes or difficulty with buttons/coins/keys. Denies perineal paresthesias, bowel/bladder dysfunction.      EXAM:  There were no  vitals taken for this visit.    My physical examination was notable for the following findings:     Musculoskeletal and neuro exam stable      IMAGING:    Today I personally re- reviewed AP, Lat and Flex/Ex  upright L-spine that demonstrate minimal degenerative changes    MRI lumbar demonstrates progressive lumbar spondylosis with disc protrusions at L3-L4 through L5-S1 as noted 05/29/2023 mildly progressive, most notably at L4-L5 with LEFT paracentral extrusion of disc material resulting in encroachment upon the LEFT lateral recess, mild central canal narrowing and mild LEFT neural foraminal encroachment.     There is no height or weight on file to calculate BMI.    Hemoglobin A1C   Date Value Ref Range Status   02/17/2023 5.5 4.0 - 5.6 % Final     Comment:     ADA Screening Guidelines:  5.7-6.4%  Consistent with prediabetes  >or=6.5%  Consistent with diabetes    High levels of fetal hemoglobin interfere with the HbA1C  assay. Heterozygous hemoglobin variants (HbS, HgC, etc)do  not significantly interfere with this assay.   However, presence of multiple variants may affect accuracy.     12/05/2019 4.6 4.0 - 5.6 % Final     Comment:     ADA Screening Guidelines:  5.7-6.4%  Consistent with prediabetes  >or=6.5%  Consistent with diabetes  High levels of fetal hemoglobin interfere with the HbA1C  assay. Heterozygous hemoglobin variants (HbS, HgC, etc)do  not significantly interfere with this assay.   However, presence of multiple variants may affect accuracy.           ASSESSMENT/PLAN:    There are no diagnoses linked to this encounter.    Today we discussed at length all of the different treatment options including anti-inflammatories, acetaminophen, rest, ice, heat, physical therapy including strengthening and stretching exercises, home exercises, ROM, aerobic conditioning, aqua therapy, other modalities including ultrasound, massage, and dry needling, epidural steroid injections and finally surgical intervention.       Will book for L4-5 discectomy                       This service was not originating from a related E/M service provided within the previous 7 days nor will  to an E/M service or procedure within the next 24 hours or my soonest available appointment.  Prevailing standard of care was able to be met in this audio-only visit.

## 2024-08-26 ENCOUNTER — OFFICE VISIT (OUTPATIENT)
Dept: ORTHOPEDICS | Facility: CLINIC | Age: 34
End: 2024-08-26
Payer: COMMERCIAL

## 2024-08-26 ENCOUNTER — PATIENT MESSAGE (OUTPATIENT)
Dept: ORTHOPEDICS | Facility: CLINIC | Age: 34
End: 2024-08-26

## 2024-08-26 DIAGNOSIS — M54.16 LUMBAR RADICULOPATHY, CHRONIC: Primary | ICD-10-CM

## 2024-08-26 PROCEDURE — 99213 OFFICE O/P EST LOW 20 MIN: CPT | Mod: 95,,, | Performed by: ORTHOPAEDIC SURGERY

## 2024-08-26 PROCEDURE — 4010F ACE/ARB THERAPY RXD/TAKEN: CPT | Mod: CPTII,95,, | Performed by: ORTHOPAEDIC SURGERY

## 2024-08-27 ENCOUNTER — PATIENT MESSAGE (OUTPATIENT)
Dept: PSYCHIATRY | Facility: CLINIC | Age: 34
End: 2024-08-27
Payer: COMMERCIAL

## 2024-08-28 ENCOUNTER — TELEPHONE (OUTPATIENT)
Dept: ENDOSCOPY | Facility: HOSPITAL | Age: 34
End: 2024-08-28
Payer: COMMERCIAL

## 2024-08-29 ENCOUNTER — TELEPHONE (OUTPATIENT)
Dept: PREADMISSION TESTING | Facility: HOSPITAL | Age: 34
End: 2024-08-29
Payer: COMMERCIAL

## 2024-08-29 ENCOUNTER — PATIENT MESSAGE (OUTPATIENT)
Dept: INTERNAL MEDICINE | Facility: CLINIC | Age: 34
End: 2024-08-29
Payer: COMMERCIAL

## 2024-08-29 DIAGNOSIS — Z01.818 PREOPERATIVE TESTING: Primary | ICD-10-CM

## 2024-08-29 RX ORDER — PROPRANOLOL HYDROCHLORIDE 10 MG/1
10 TABLET ORAL 3 TIMES DAILY PRN
COMMUNITY

## 2024-08-29 NOTE — PRE-PROCEDURE INSTRUCTIONS
Patient stated has not had any problem with anesthesia in the past. Will need medical clearance from your PCP, Dr. Stalin Gordillo. He will make an appt. I will ask Dr. Gordillo for ASA 81 instructions.. Will need  labs, ua, and EKG. Our  will call to set up these appts.         Preop instructions given. Hold other aspirin containing products, nsaids( Aleve, Advil, Motrin, Ibuprofen, Naprosyn, Naproxen, Voltaren, Diclofenac, Mobic, Meloxicam, Celebrex, Celecoxib), vitamins and supplements one week prior to surgery.   May take Tylenol. Await instructions form Dr. Gordillo for Aspirin 81 mg.  Medication instructions given:    Disp Refills Start End   ALPRAZolam (XANAX) 0.5 MG tablet 10 tablet 0 6/5/2024 --   Sig: Take 1 tablet (0.5 mg total) by mouth daily as needed for Anxiety. 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   Class: Historical Med   Route: Oral   gabapentin (NEURONTIN) 300 MG capsule 180 capsule 0 6/10/2024 7/10/2024   Sig: Take 1-2 capsules (300-600 mg total) by mouth 3 (three) times daily.   Route: Margot Darden RN 8/29/2024  9:39 AM  TAKE IN AM OF SURGERY.              olmesartan-amLODIPin-hcthiazid 40-10-25 mg Tab 90 tablet 2 5/16/2024 5/16/2025   Sig: Take 1 tablet by mouth once daily.   Route: Margot Darden RN 8/29/2024  9:40 AM  HOLD IN AM OF SURGERY.            propranoloL (INDERAL) 10 MG tablet -- --  --   Sig: Take 10 mg by mouth 3 (three) times daily as needed.   Class: Historical Med   Route: Margot Darden RN 8/29/2024  9:42 AM  TAKE IF NEEDED            topiramate (TOPAMAX) 50 MG tablet 90 tablet 1 3/4/2024 3/4/2025   Sig: Take 1 tablet (50 mg total) by mouth once daily.   Route: Oral       Margot Canela RN 8/29/2024  9:38 AM              Your surgery has been scheduled for:_Monday  9/30/2024_________________________________________  [facility] (Margot) [phone]  You should report to:  __X__[facility]  ____[facility], located on the [place] side of the first floor of the           [facility] ([phone])  ____The Second Floor [facility] Center, located on the [facility] side of the            Second floor of the [facility] ([phone])  ____3rd Floor [facility] located on the [facility] side of the [facility] [phone]  Please Note   Tell your doctor if you take Aspirin, products containing Aspirin, herbal medications  or blood thinners, such as Coumadin, Ticlid, or Plavix.  (Consult your provider regarding holding or stopping before surgery).  Arrange for someone to drive you home following surgery.  You will not be allowed to leave the surgical facility alone or drive yourself home following sedation and anesthesia.  Before Surgery  Stop taking all vitamins/ herbal medications 14days prior to surgery  No Motrin/Advil (Ibuprofen) 7 days before surgery  No Aleve (Naproxen) 7 days before surgery  Stop Taking Asprin, products containing Asprin _____days before surgery  Stop taking blood thinners_______days before surgery  No Goody's/BC  Powder 7 days before surgery  Refrain from drinking alcoholic beverages for 24hours before and after surgery  Stop or limit smoking _________days before surgery( nonsmoker)  You may take Tylenol for pain  Night before Surgery  Nothing to eat or drink after midnight.  Take a shower or bath (shower is recommended).  Bathe with Hibiclens soap or an antibacterial soap from the neck down.  If not supplied by your surgeon, hibiclens soap will need to be purchased over the counter in pharmacy.  Rinse soap off thoroughly.  Shampoo your hair with your regular shampoo  The Day of Surgery  NOTHING TO  DRINK 2 hours before arrival time. If you are told to  take medication on the morning of surgery, it may be taken with a sip of water.   Take another bath or shower with hibiclens or any antibacterial soap, to reduce the chance of infection.  Take heart and blood pressure medications with a small sip of water, as advised by the perioperative team.  Do not take fluid pills  You may brush your teeth and rinse your mouth, but do not swall any additional water.   Do not apply perfumes, powder, body lotions or deodorant on the day of surgery.  Nail polish should be removed.  Do not wear makeup or moisturizer  Wear comfortable clothes, such as a button front shirt and loose fitting pants.  Leave all jewelry, including body piercings, and valuables at home.    Bring any devices you will neeed after surgery such as crutches or canes.  If you have sleep apnea, please bring your CPAP machine  In the event that your physical condition changes including the onset of a cold or respiratory illness, or if you have to delay or cancel your surgery, please notify your surgeon.   Directions given to the surgery center. Also sent preop and med instructions to [facility] portal.Verbalizes understanding.

## 2024-08-29 NOTE — TELEPHONE ENCOUNTER
----- Message from Margot Canela RN sent at 8/29/2024 10:05 AM CDT -----  Surgery 9/30  Please schedule labs, ua, and EKG.  Thanks!

## 2024-08-29 NOTE — ANESTHESIA PAT ROS NOTE
08/29/2024  Scott Gallagher is a 34 y.o., male.      Pre-op Assessment          Review of Systems  Anesthesia Hx:  No problems with previous Anesthesia             Denies Family Hx of Anesthesia complications.    Denies Personal Hx of Anesthesia complications.                    Social:  Non-Smoker, Alcohol Use 1-2 DRINKS 2-3 TIMES PER WEEK      Hematology/Oncology:  Hematology Normal   Oncology Normal                                   EENT/Dental:  EENT/Dental Normal           Cardiovascular:            Denies Angina.         H/O L CAROTID ARTERY DISSECTION PER EPIC Functional Capacity 4 METS, ABLE TO CLIMB 2 FLIGHTS OF STAIRS                   Hypertension  , Recent typical clinic B/P of 120/84       Pulmonary:       Denies Shortness of breath.  Denies Recent URI.  H/O CHILDHOOD ASTHMA PER PATIENT               Musculoskeletal:   Musculoskeletal General/Symptoms:  Functional capacity is ambulatory without assistance.          Lumbar Spine Disorders, Lumbar Disc Disease, Radiculopathy   Neurological:           PATIENT STATED HAD STROKE IN 5/2021 Neuro Symptoms of pain OF LOWER BACK AND LLEDx of Headaches, Migraine Headache                           Endocrine:        Metabolic Disorders, Obesity / BMI > 30  Psych:   Anxiety Disorder.                    Anesthesia Assessment: Preoperative EQUATION    Planned Procedure: Procedure(s) (LRB):  LAMINECTOMY, SPINE, LUMBAR, WITH DISCECTOMY L4-5 (L) TREVOR RETRACTORS SNS: SSEP/EMG (N/A)  Requested Anesthesia Type:General  Surgeon: Raleigh Bond MD  Service: Orthopedics  Known or anticipated Date of Surgery:9/30/2024    Surgeon notes: reviewed    Electronic QUestionnaire Assessment completed via nurse interview with patient.        Triage considerations:     The patient has no apparent active cardiac condition (No unstable coronary Syndrome such as severe unstable  angina or recent [<1 month] myocardial infarction, decompensated CHF, severe valvular   disease or significant arrhythmia)    Previous anesthesia records:No problems and Not available    Last PCP note: 6-12 months ago , within [facility]   Subspecialty notes: ENT, Gastroenterology, Neurology, Ortho, Psychiatry, CRS, OPTOMETRY, PODIATRY    Other important co-morbidities: HTN, Obesity, and LUMBAR RADICULOPATHY       Tests already available:  Available tests,  within 3 months , 3-6 months ago , within [facility] . 8/19/2024 MRI LUMBAR SPINE W/O CONTRAST, 5/23/2024 XRAY LUMBAR SPINE AP& LAT            Instructions given. (See in Nurse's note)    Optimization:  Anesthesia Preop Clinic Assessment -not Indicated for this surgery    Medical Opinion Indicated           Plan:    Testing:  BMP, EKG, Hematology Profile, PT/INR, PTT, T&S, and UA      Consultation:Patient's PCP for re-evaluation     Patient  has previously scheduled Medical Appointment:9/5 EGD    Navigation: Tests Scheduled. TBD             Consults scheduled.TBD             Results will be tracked by Preop Clinic.  8/29 ----- Message from Stalin Gordillo MD sent at 8/29/2024  2:00 PM CDT -----  Hold aspirin for 1 week prior to surgery  9/20 Labs and UA resulted and noted by Dr.Melissa Rosario,. EKG resulted and noted by Dr. Barbara Nvaa.  9/25 Medical clearance by Dr. Stalin Gordillo on 9/24: He is medically cleared and optimized for general anesthesia and spinal surgery at minimal risk of perioperative complications.   Margot Canela RN BSN

## 2024-08-29 NOTE — TELEPHONE ENCOUNTER
----- Message from Stalin Gordillo MD sent at 8/29/2024  2:00 PM CDT -----  Hold aspirin for 1 week prior to surgery  ----- Message -----  From: Margot Canela RN  Sent: 8/29/2024  10:08 AM CDT  To: Margot Canela RN; Stalin Gordillo MD; #    Patient is scheduled for L4-5 Laminectomy with discectomy on  9/30 with Dr. Bond .( Approximately  150 minutes of general anesthesia) He  will need medical clearance. Please schedule a preop clearance appt.    I will need ASA 81 mg instructions.   Please provide stop time instructions, by entering a note in EPIC.     Thanks!

## 2024-08-30 ENCOUNTER — TELEPHONE (OUTPATIENT)
Dept: PREADMISSION TESTING | Facility: HOSPITAL | Age: 34
End: 2024-08-30

## 2024-09-03 ENCOUNTER — TELEPHONE (OUTPATIENT)
Dept: PREADMISSION TESTING | Facility: HOSPITAL | Age: 34
End: 2024-09-03

## 2024-09-03 NOTE — PRE-PROCEDURE INSTRUCTIONS
Instructed to : Hold  ASA 81 mg one week prior to surgery per Dr.Christopher Gordillo. (last dose 9/22)Verbalizes understanding.

## 2024-09-04 ENCOUNTER — TELEPHONE (OUTPATIENT)
Dept: ENDOSCOPY | Facility: HOSPITAL | Age: 34
End: 2024-09-04
Payer: COMMERCIAL

## 2024-09-04 ENCOUNTER — OFFICE VISIT (OUTPATIENT)
Dept: INFECTIOUS DISEASES | Facility: CLINIC | Age: 34
End: 2024-09-04
Payer: COMMERCIAL

## 2024-09-04 DIAGNOSIS — Z29.81 ENCOUNTER FOR HIV PRE-EXPOSURE PROPHYLAXIS: Primary | ICD-10-CM

## 2024-09-04 DIAGNOSIS — Z79.899 ENCOUNTER FOR LONG-TERM (CURRENT) USE OF HIGH-RISK MEDICATION: ICD-10-CM

## 2024-09-04 PROCEDURE — 99203 OFFICE O/P NEW LOW 30 MIN: CPT | Mod: 95,,, | Performed by: STUDENT IN AN ORGANIZED HEALTH CARE EDUCATION/TRAINING PROGRAM

## 2024-09-04 PROCEDURE — 4010F ACE/ARB THERAPY RXD/TAKEN: CPT | Mod: CPTII,95,, | Performed by: STUDENT IN AN ORGANIZED HEALTH CARE EDUCATION/TRAINING PROGRAM

## 2024-09-04 PROCEDURE — G2211 COMPLEX E/M VISIT ADD ON: HCPCS | Mod: 95,,, | Performed by: STUDENT IN AN ORGANIZED HEALTH CARE EDUCATION/TRAINING PROGRAM

## 2024-09-04 NOTE — TELEPHONE ENCOUNTER
Spoke to patient for pre-call to confirm scheduled Colonoscopy/EGD and patient verbalized understanding of the following:       Date of Procedure (s)  verified 9/5/24  Arrival Time 12:15 PM verified.  Location of Procedure(s) [address] verified.  NPO status reinforced. Ok to continue clear liquids up until 4 hours prior to the Endoscopy procedure.     Pt confirmed receipt of prep instructions and Rx prep (if applicable).  Instructions provided to patient via [facility]  Pt confirmed ride home after procedure if procedure requires anesthesia.   Pre-call screening questionnaire reviewed and completed with patient.   Appointment details are tentative, including check-in time.  If the patient begins taking any blood thinning medications, injectable weight loss/diabetes medications (other than insulin), or Adipex (phentermine) patient was instructed to contact the endoscopy scheduling department as soon as possible.  Patient was advised to call the endoscopy scheduling department if any questions or concerns arise.       [facility] Scheduling Department

## 2024-09-04 NOTE — PROGRESS NOTES
The patient location is: home  The chief complaint leading to consultation is: start PrEP    Visit type: audiovisual    Face to Face time with patient: 15 minutes  36 minutes of total time spent on the encounter, which includes face to face time and non-face to face time preparing to see the patient (eg, review of tests), Obtaining and/or reviewing separately obtained history, Documenting clinical information in the electronic or other health record, Independently interpreting results (not separately reported) and communicating results to the patient/family/caregiver, or Care coordination (not separately reported).         Each patient to whom he or she provides medical services by telemedicine is:  (1) informed of the relationship between the physician and patient and the respective role of any other health care provider with respect to management of the patient; and (2) notified that he or she may decline to receive medical services by telemedicine and may withdraw from such care at any time.    Notes:       Infectious Diseases Progress Note  Subjective:     Chief Complaint: establish care for PrEP    HPI: Scott Gallagher is a 34 y.o. male here to start PrEP. Previously took descovy, stopped about 5 years, took for 18 months. Tolerated without issue. He is sexually active with his long term male partner and planning to have different partners as well going forward. Has a history of pharyngeal gonorrhea about 4 years ago and unclear etiology of genital papules that resolved.  Currently lives in [place], and works as a musician. Previously lived in [place] and [place]. No allergies.     No current symptoms of STIs.     Immunization History   Administered Date(s) Administered    COVID-19, MRNA, LN-S, PF (Pfizer) (Purple Cap) 03/15/2021, 04/09/2021    COVID-19, mRNA, LNP-S, bivalent booster, PF (PFIZER OMICRON) 05/05/2023    Hepatitis A, Adult 02/07/2019    Influenza - Quadrivalent - PF *Preferred* (6 months  and older) 12/05/2019, 10/16/2023    Pneumococcal Polysaccharide - 23 Valent 12/05/2019    Tdap 12/05/2019        Review of Systems   All other systems reviewed and are negative.       Past Medical History:   Diagnosis Date    Hypertension      Past Surgical History:   Procedure Laterality Date    APPENDECTOMY  2004    EPIDURAL STEROID INJECTION N/A 7/21/2023    Procedure: L4-5 LESI (toward the left);  Surgeon: Dom Jha DO;  Location: [facility];  Service: Pain Management;  Laterality: N/A;  oral sedation    EPIDURAL STEROID INJECTION INTO LUMBAR SPINE N/A 4/23/2024    Procedure: L4-5 Lumbar SUSANNA (toward the left - long needle);  Surgeon: Dom Jha DO;  Location: [facility];  Service: Pain Management;  Laterality: N/A;  15mins-Oral Sed    WISDOM TOOTH EXTRACTION  2011     Family History   Problem Relation Name Age of Onset    Hypertension Mother      Diabetes type II Mother      Hypertension Father      Diabetes type II Father      Hypertension Sister  28    Hypertension Brother      Hypertension Brother      Hypertension Brother      Hypertension Sister       Social History     Tobacco Use    Smoking status: Never    Smokeless tobacco: Never   Substance Use Topics    Alcohol use: Yes    Drug use: Yes     Types: Marijuana     Comment: very rarely used, edible       Objective:     Physical Exam  Constitutional:       Appearance: He is not ill-appearing.   Pulmonary:      Effort: No respiratory distress.   Neurological:      General: No focal deficit present.      Mental Status: He is alert.   Psychiatric:         Mood and Affect: Mood normal.         Data:    All data, including recent labs, radiology, and pathology, has been independently reviewed.    Labs:  HIV: last negative 2/2020  Hepatitis B: immune 4/2024  Hepatitis C: negative 4/2024  RPR: none   Gonorrhea: last negative 2/2020  Chlamydia: last negative 2/2020    Radiology:    No results found in the last 24  hours.     Assessment:  HIV pre-exposure prophylaxis  - BMP, CBC, HIV, 3 site testing for gonorrhea and chlamydia, RPR and trep pal ab today  - will prescribe emtricitabine/tenofovir when HIV screen returns   - STI testing q3 months  - BMP q12 months unless >50 years old or CrCl<90  - Lipid panel and Hepatitis C screen q12 months     Recommended PrEP Lab Schedule    Test  Baseline Q3m Q6m Q12m When stopping   HIV Ab/Ag test + RNA quant X X   X   Hep B serology X       Hep C serology  X   MSM, TGW, PWID    Gonorrhea  X X   MSM/TGW   Chlamydia X X   MSM/TGW   Syphilis  X X   MSM/TGW   Lipid panel (if on F/TAF) X   X      eCrCl  X  If age >50 or CrCl <90 X      Vaccines  Hepatitis A: ordered  HPV: ordered  Mpox: counseled to reach out to [facility] for possible vaccination     Follow up in 6 months    I spent a total of 36 minutes on the day of the visit.  This includes face to face time and non-face to face time preparing to see the patient (eg, review of tests), obtaining and/or reviewing separately obtained history, documenting clinical information in the electronic or other health record, independently interpreting results and communicating results to the patient/family/caregiver, or care coordinator.    Visit today included increased complexity associated with the care of the episodic problem exposure to HIV addressed and managing the longitudinal care of the patient due to the serious and/or complex managed problem(s).      Rosa Murphy MD  Infectious Disease

## 2024-09-05 ENCOUNTER — ANESTHESIA EVENT (OUTPATIENT)
Dept: ENDOSCOPY | Facility: HOSPITAL | Age: 34
End: 2024-09-05
Payer: COMMERCIAL

## 2024-09-05 ENCOUNTER — HOSPITAL ENCOUNTER (OUTPATIENT)
Facility: HOSPITAL | Age: 34
Discharge: HOME | End: 2024-09-05
Attending: INTERNAL MEDICINE | Admitting: INTERNAL MEDICINE
Payer: COMMERCIAL

## 2024-09-05 ENCOUNTER — ANESTHESIA (OUTPATIENT)
Dept: ENDOSCOPY | Facility: HOSPITAL | Age: 34
End: 2024-09-05
Payer: COMMERCIAL

## 2024-09-05 VITALS
BODY MASS INDEX: 38.36 KG/M2 | SYSTOLIC BLOOD PRESSURE: 103 MMHG | HEART RATE: 63 BPM | WEIGHT: 315 LBS | OXYGEN SATURATION: 94 % | RESPIRATION RATE: 14 BRPM | HEIGHT: 76 IN | TEMPERATURE: 98 F | DIASTOLIC BLOOD PRESSURE: 57 MMHG

## 2024-09-05 DIAGNOSIS — R10.9 ABDOMINAL PAIN: ICD-10-CM

## 2024-09-05 PROCEDURE — 88305 TISSUE EXAM BY PATHOLOGIST: CPT | Mod: 59 | Performed by: PATHOLOGY

## 2024-09-05 PROCEDURE — D9220A PRA ANESTHESIA: Mod: ANES,,, | Performed by: ANESTHESIOLOGY

## 2024-09-05 PROCEDURE — 43239 EGD BIOPSY SINGLE/MULTIPLE: CPT | Performed by: INTERNAL MEDICINE

## 2024-09-05 PROCEDURE — 88305 TISSUE EXAM BY PATHOLOGIST: CPT | Mod: 26,,, | Performed by: PATHOLOGY

## 2024-09-05 PROCEDURE — 25000003 PHARM REV CODE 250: Performed by: NURSE ANESTHETIST, CERTIFIED REGISTERED

## 2024-09-05 PROCEDURE — 37000009 HC ANESTHESIA EA ADD 15 MINS: Performed by: INTERNAL MEDICINE

## 2024-09-05 PROCEDURE — 37000008 HC ANESTHESIA 1ST 15 MINUTES: Performed by: INTERNAL MEDICINE

## 2024-09-05 PROCEDURE — 45380 COLONOSCOPY AND BIOPSY: CPT | Mod: ,,, | Performed by: INTERNAL MEDICINE

## 2024-09-05 PROCEDURE — D9220A PRA ANESTHESIA: Mod: CRNA,,, | Performed by: NURSE ANESTHETIST, CERTIFIED REGISTERED

## 2024-09-05 PROCEDURE — 88342 IMHCHEM/IMCYTCHM 1ST ANTB: CPT | Performed by: PATHOLOGY

## 2024-09-05 PROCEDURE — 63600175 PHARM REV CODE 636 W HCPCS: Performed by: NURSE ANESTHETIST, CERTIFIED REGISTERED

## 2024-09-05 PROCEDURE — 88342 IMHCHEM/IMCYTCHM 1ST ANTB: CPT | Mod: 26,,, | Performed by: PATHOLOGY

## 2024-09-05 PROCEDURE — 43239 EGD BIOPSY SINGLE/MULTIPLE: CPT | Mod: 51,,, | Performed by: INTERNAL MEDICINE

## 2024-09-05 PROCEDURE — 27201012 HC FORCEPS, HOT/COLD, DISP: Performed by: INTERNAL MEDICINE

## 2024-09-05 PROCEDURE — 45380 COLONOSCOPY AND BIOPSY: CPT | Performed by: INTERNAL MEDICINE

## 2024-09-05 RX ORDER — GLUCAGON 1 MG
1 KIT INJECTION
Status: DISCONTINUED | OUTPATIENT
Start: 2024-09-05 | End: 2024-09-05 | Stop reason: HOSPADM

## 2024-09-05 RX ORDER — PHENYLEPHRINE HYDROCHLORIDE 10 MG/ML
INJECTION INTRAVENOUS
Status: DISCONTINUED | OUTPATIENT
Start: 2024-09-05 | End: 2024-09-05

## 2024-09-05 RX ORDER — EPHEDRINE SULFATE 50 MG/ML
INJECTION, SOLUTION INTRAVENOUS
Status: DISCONTINUED | OUTPATIENT
Start: 2024-09-05 | End: 2024-09-05

## 2024-09-05 RX ORDER — PROPOFOL 10 MG/ML
VIAL (ML) INTRAVENOUS CONTINUOUS PRN
Status: DISCONTINUED | OUTPATIENT
Start: 2024-09-05 | End: 2024-09-05

## 2024-09-05 RX ORDER — FENTANYL CITRATE 50 UG/ML
INJECTION, SOLUTION INTRAMUSCULAR; INTRAVENOUS
Status: DISCONTINUED | OUTPATIENT
Start: 2024-09-05 | End: 2024-09-05

## 2024-09-05 RX ORDER — PROPOFOL 10 MG/ML
VIAL (ML) INTRAVENOUS
Status: DISCONTINUED | OUTPATIENT
Start: 2024-09-05 | End: 2024-09-05

## 2024-09-05 RX ORDER — LIDOCAINE HYDROCHLORIDE 20 MG/ML
INJECTION INTRAVENOUS
Status: DISCONTINUED | OUTPATIENT
Start: 2024-09-05 | End: 2024-09-05

## 2024-09-05 RX ORDER — ONDANSETRON HYDROCHLORIDE 2 MG/ML
4 INJECTION, SOLUTION INTRAVENOUS ONCE AS NEEDED
Status: DISCONTINUED | OUTPATIENT
Start: 2024-09-05 | End: 2024-09-05 | Stop reason: HOSPADM

## 2024-09-05 RX ADMIN — EPHEDRINE SULFATE 10 MG: 50 INJECTION INTRAVENOUS at 01:09

## 2024-09-05 RX ADMIN — PHENYLEPHRINE HYDROCHLORIDE 100 MCG: 10 INJECTION INTRAVENOUS at 01:09

## 2024-09-05 RX ADMIN — EPHEDRINE SULFATE 15 MG: 50 INJECTION INTRAVENOUS at 01:09

## 2024-09-05 RX ADMIN — GLYCOPYRROLATE 0.1 MG: 0.2 INJECTION, SOLUTION INTRAMUSCULAR; INTRAVENOUS at 01:09

## 2024-09-05 RX ADMIN — PHENYLEPHRINE HYDROCHLORIDE 100 MCG: 10 INJECTION INTRAVENOUS at 02:09

## 2024-09-05 RX ADMIN — PROPOFOL 60 MG: 10 INJECTION, EMULSION INTRAVENOUS at 01:09

## 2024-09-05 RX ADMIN — SODIUM CHLORIDE: 0.9 INJECTION, SOLUTION INTRAVENOUS at 01:09

## 2024-09-05 RX ADMIN — PROPOFOL 40 MG: 10 INJECTION, EMULSION INTRAVENOUS at 01:09

## 2024-09-05 RX ADMIN — PROPOFOL 50 MG: 10 INJECTION, EMULSION INTRAVENOUS at 01:09

## 2024-09-05 RX ADMIN — FENTANYL CITRATE 50 MCG: 50 INJECTION, SOLUTION INTRAMUSCULAR; INTRAVENOUS at 01:09

## 2024-09-05 RX ADMIN — PROPOFOL 200 MCG/KG/MIN: 10 INJECTION, EMULSION INTRAVENOUS at 01:09

## 2024-09-05 RX ADMIN — SODIUM CHLORIDE, SODIUM GLUCONATE, SODIUM ACETATE, POTASSIUM CHLORIDE, MAGNESIUM CHLORIDE, SODIUM PHOSPHATE, DIBASIC, AND POTASSIUM PHOSPHATE: .53; .5; .37; .037; .03; .012; .00082 INJECTION, SOLUTION INTRAVENOUS at 02:09

## 2024-09-05 RX ADMIN — LIDOCAINE HYDROCHLORIDE 100 MG: 20 INJECTION INTRAVENOUS at 01:09

## 2024-09-05 NOTE — PROVATION PATIENT INSTRUCTIONS
Discharge Summary/Instructions after an Endoscopic Procedure  Patient Name: Scott Gallagher  Patient MRN: [id number]  Patient YOB: 1990 Thursday, September 5, 2024  Rahul Valentine MD  Dear patient,  As a result of recent federal legislation (The Federal Cures Act), you may   receive lab or pathology results from your procedure in your [facility]   account before your physician is able to contact you. Your physician or   their representative will relay the results to you with their   recommendations at their soonest availability.  Thank you,  RESTRICTIONS:  During your procedure today, you received medications for sedation.  These   medications may affect your judgment, balance and coordination.  Therefore,   for 24 hours, you have the following restrictions:   - DO NOT drive a car, operate machinery, make legal/financial decisions,   sign important papers or drink alcohol.    ACTIVITY:  Today: no heavy lifting, straining or running due to procedural   sedation/anesthesia.  The following day: return to full activity including work.  DIET:  Eat and drink normally unless instructed otherwise.     TREATMENT FOR COMMON SIDE EFFECTS:  - Mild abdominal pain, nausea, belching, bloating or excessive gas:  rest,   eat lightly and use a heating pad.  - Sore Throat: treat with throat lozenges and/or gargle with warm salt   water.  - Because air was used during the procedure, expelling large amounts of air   from your rectum or belching is normal.  - If a bowel prep was taken, you may not have a bowel movement for 1-3 days.    This is normal.  SYMPTOMS TO WATCH FOR AND REPORT TO YOUR PHYSICIAN:  1. Abdominal pain or bloating, other than gas cramps.  2. Chest pain.  3. Back pain.  4. Signs of infection such as: chills or fever occurring within 24 hours   after the procedure.  5. Rectal bleeding, which would show as bright red, maroon, or black stools.   (A tablespoon of blood from the rectum is not serious, especially  if   hemorrhoids are present.)  6. Vomiting.  7. Weakness or dizziness.  GO DIRECTLY TO THE NEAREST EMERGENCY ROOM IF YOU HAVE ANY OF THE FOLLOWING:      Difficulty breathing              Chills and/or fever over 101 F   Persistent vomiting and/or vomiting blood   Severe abdominal pain   Severe chest pain   Black, tarry stools   Bleeding- more than one tablespoon   Any other symptom or condition that you feel may need urgent attention  Your doctor recommends these additional instructions:  If any biopsies were taken, your doctors clinic will contact you in 1 to 2   weeks with any results.  - Await pathology results.   - Perform a colonoscopy now.   - See colonoscopy report for further recommendations.  For questions, problems or results please call your physician - Rahul Valentine MD at Work:  [phone].  [facility], EMERGENCY ROOM PHONE NUMBER: [phone]  IF A COMPLICATION OR EMERGENCY SITUATION ARISES AND YOU ARE UNABLE TO REACH   YOUR PHYSICIAN - GO DIRECTLY TO THE EMERGENCY ROOM.  Rahul Valentine MD  9/5/2024 2:24:40 PM  This report has been verified and signed electronically.  Dear patient,  As a result of recent federal legislation (The Federal Cures Act), you may   receive lab or pathology results from your procedure in your [facility]   account before your physician is able to contact you. Your physician or   their representative will relay the results to you with their   recommendations at their soonest availability.  Thank you,  PROVATION

## 2024-09-05 NOTE — H&P
Short Stay Endoscopy History and Physical    PCP - Stalin Gordillo MD    Procedure - EGD/Colonoscopy  ASA - per anesthesia  Mallampati - per anesthesia  History of Anesthesia problems - no  Family history Anesthesia problems -  no   Plan of anesthesia - MAC    HPI:  This is a 34 y.o. male here for evaluation of abdominal pain, alternating constipation and diarrhea, and nausea.       ROS:  Constitutional: No fevers, chills  CV: No chest pain  Pulm: No cough, No shortness of breath  Ophtho: No vision changes  GI: see HPI    Medical History:  has a past medical history of Hypertension.    Surgical History:  has a past surgical history that includes Appendectomy (2004); [place] tooth extraction (2011); Epidural steroid injection (N/A, 7/21/2023); and Epidural steroid injection into lumbar spine (N/A, 4/23/2024).    Family History: family history includes Diabetes type II in his father and mother; Hypertension in his brother, brother, brother, father, mother, and sister; Hypertension (age of onset: 28) in his sister.. Otherwise no colon cancer, inflammatory bowel disease, or GI malignancies.    Social History:  reports that he has never smoked. He has never used smokeless tobacco. He reports current alcohol use. He reports current drug use. Drug: Marijuana.    Review of patient's allergies indicates:   Allergen Reactions    Anti-nausea [phosphorated carbohydrate] Hives     Pt unsure of name of Rx; states 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       Medications:   Facility-Administered Medications Prior to Admission   Medication Dose Route Frequency Provider Last Rate Last Admin    Hep A (Havrix) IM vaccine (>/= 20 yo)  1,440 Units Intramuscular Q6 Months         VFC-hpv vaccine,9-robi (GARDASIL 9) vaccine 0.5 mL  0.5 mL Intramuscular vaccine x 1 dose          Medications Prior to Admission   Medication Sig Dispense Refill Last Dose    ALPRAZolam  "(XANAX) 0.5 MG tablet Take 1 tablet (0.5 mg total) by mouth daily as needed for Anxiety. 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 as needed for anxiety) 10 tablet 0 Past Week    aspirin 81 MG Chew Take 81 mg by mouth once daily.   9/5/2024    gabapentin (NEURONTIN) 300 MG capsule Take 1-2 capsules (300-600 mg total) by mouth 3 (three) times daily. 180 capsule 0 9/5/2024    olmesartan-amLODIPin-hcthiazid 40-10-25 mg Tab Take 1 tablet by mouth once daily. 90 tablet 2 9/5/2024    propranoloL (INDERAL) 10 MG tablet Take 10 mg by mouth 3 (three) times daily as needed.   Past Week    topiramate (TOPAMAX) 50 MG tablet Take 1 tablet (50 mg total) by mouth once daily. 90 tablet 1 9/5/2024       Physical Exam:    Vital Signs:   Vitals:    09/05/24 1252   BP: 115/62   Pulse: 76   Resp: 16   Temp: 97.9 °F (36.6 °C)       General Appearance: Well appearing in no acute distress  Eyes:    No scleral icterus  Lungs: CTA anteriorly  Heart:  Regular rate and rhythm  Abdomen: Soft, non tender, non distended with normal bowel sounds.    Labs:  Lab Results   Component Value Date    WBC 6.77 04/26/2024    HGB 15.8 04/26/2024    HCT 46.0 04/26/2024    MCV 90 04/26/2024     04/26/2024        BMP  Lab Results   Component Value Date     04/26/2024    K 3.8 04/26/2024     04/26/2024    CO2 24 04/26/2024    BUN 16 04/26/2024    CREATININE 1.0 04/26/2024    CALCIUM 9.3 04/26/2024    ANIONGAP 10 04/26/2024    ESTGFRAFRICA >60 12/05/2019    EGFRNONAA >60 12/05/2019     No results found for: "INR", "PROTIME"       Assessment:  34 y.o. male with abdominal pain, alternating constipation and diarrhea, and nausea.     Plan:  Proceed with EGD and colonoscopy today.  I have explained the risks and benefits of endoscopy procedures to the patient including but not limited to bleeding, perforation, infection, " and death.  All questions answered.        Rahul Valentine MD

## 2024-09-05 NOTE — PROVATION PATIENT INSTRUCTIONS
Discharge Summary/Instructions after an Endoscopic Procedure  Patient Name: Scott Gallagher  Patient MRN: [id number]  Patient YOB: 1990 Thursday, September 5, 2024  Rahul Valentine MD  Dear patient,  As a result of recent federal legislation (The Federal Cures Act), you may   receive lab or pathology results from your procedure in your [facility]   account before your physician is able to contact you. Your physician or   their representative will relay the results to you with their   recommendations at their soonest availability.  Thank you,  RESTRICTIONS:  During your procedure today, you received medications for sedation.  These   medications may affect your judgment, balance and coordination.  Therefore,   for 24 hours, you have the following restrictions:   - DO NOT drive a car, operate machinery, make legal/financial decisions,   sign important papers or drink alcohol.    ACTIVITY:  Today: no heavy lifting, straining or running due to procedural   sedation/anesthesia.  The following day: return to full activity including work.  DIET:  Eat and drink normally unless instructed otherwise.     TREATMENT FOR COMMON SIDE EFFECTS:  - Mild abdominal pain, nausea, belching, bloating or excessive gas:  rest,   eat lightly and use a heating pad.  - Sore Throat: treat with throat lozenges and/or gargle with warm salt   water.  - Because air was used during the procedure, expelling large amounts of air   from your rectum or belching is normal.  - If a bowel prep was taken, you may not have a bowel movement for 1-3 days.    This is normal.  SYMPTOMS TO WATCH FOR AND REPORT TO YOUR PHYSICIAN:  1. Abdominal pain or bloating, other than gas cramps.  2. Chest pain.  3. Back pain.  4. Signs of infection such as: chills or fever occurring within 24 hours   after the procedure.  5. Rectal bleeding, which would show as bright red, maroon, or black stools.   (A tablespoon of blood from the rectum is not serious, especially  if   hemorrhoids are present.)  6. Vomiting.  7. Weakness or dizziness.  GO DIRECTLY TO THE NEAREST EMERGENCY ROOM IF YOU HAVE ANY OF THE FOLLOWING:      Difficulty breathing              Chills and/or fever over 101 F   Persistent vomiting and/or vomiting blood   Severe abdominal pain   Severe chest pain   Black, tarry stools   Bleeding- more than one tablespoon   Any other symptom or condition that you feel may need urgent attention  Your doctor recommends these additional instructions:  If any biopsies were taken, your doctors clinic will contact you in 1 to 2   weeks with any results.  - Discharge patient to home.   - Patient has a contact number available for emergencies.  The signs and   symptoms of potential delayed complications were discussed with the   patient.  Return to normal activities tomorrow.  Written discharge   instructions were provided to the patient.   - Resume previous diet.   - Continue present medications.   - Await pathology results.   - Repeat colonoscopy at age 43 for screening purposes.  For questions, problems or results please call your physician - Rahul Valentine MD at Work:  [phone].  [facility], EMERGENCY ROOM PHONE NUMBER: [phone]  IF A COMPLICATION OR EMERGENCY SITUATION ARISES AND YOU ARE UNABLE TO REACH   YOUR PHYSICIAN - GO DIRECTLY TO THE EMERGENCY ROOM.  Rahul Valentine MD  9/5/2024 2:30:41 PM  This report has been verified and signed electronically.  Dear patient,  As a result of recent federal legislation (The Federal Cures Act), you may   receive lab or pathology results from your procedure in your [facility]   account before your physician is able to contact you. Your physician or   their representative will relay the results to you with their   recommendations at their soonest availability.  Thank you,  PROVATION

## 2024-09-05 NOTE — ANESTHESIA PREPROCEDURE EVALUATION
[facility]  Anesthesia Pre-Operative Evaluation     Patient Name: Scott Gallagher  YOB: 1990  MRN: [id number]  [facility]: [id number]       Admit Date: 9/5/2024   Admit Team: Networked reference to record PCT   Hospital Day: 1  Date of Procedure: 9/5/2024  Anesthesia: Choice Procedure: Procedure(s) (LRB):  EGD (ESOPHAGOGASTRODUODENOSCOPY) (N/A)  COLONOSCOPY (N/A)  Pre-Operative Diagnosis: Generalized abdominal pain [R10.84]  Irregular bowel habits [R19.8]  Blood in stool [K92.1]  Proceduralist:Surgeons and Role:     * Rahul Valentine MD - Primary  Code Status: No Order   Advanced Directive: <no information>  Isolation Precautions: No active isolations  Capacity: Full capacity     SUBJECTIVE:   Scott Gallagher is a 34 y.o. male who  has a past medical history of Hypertension.  No notes on file    Hospital LOS: 0 days  ICU LOS: Patient does not have an ICU stay during this admission.    he has a current medication list which includes the following long-term medication(s): alprazolam, aspirin, gabapentin, olmesartan-amlodipin-hcthiazid, and topiramate.   Current Outpatient Medications   Medication Instructions    ALPRAZolam (XANAX) 0.5 mg, Oral, Daily PRN, 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 as needed for anxiety    aspirin 81 mg, Oral, Daily    gabapentin (NEURONTIN) 300-600 mg, Oral, 3 times daily    olmesartan-amLODIPin-hcthiazid 40-10-25 mg Tab 1 tablet, Oral, Daily    propranoloL (INDERAL) 10 mg, Oral, 3 times daily PRN    topiramate (TOPAMAX) 50 mg, Oral, Daily     ALLERGIES:     Review of patient's allergies indicates:   Allergen Reactions    Anti-nausea [phosphorated carbohydrate] Hives     Pt unsure of name of Rx; states 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     LDA:   AIRWAY:         * No LDAs found *      Lines/Drains/Airways       Peripheral Intravenous Line  Duration                  Peripheral IV - Single Lumen  09/05/24 1302 20 G Right Hand <1 day                   Anesthesia Evaluation      Airway   Mallampati: III  TM distance: Normal  Neck ROM: Normal ROM  Dental    (+) Intact    Pulmonary    Cardiovascular   Exercise tolerance: good  (+) hypertension    Neuro/Psych    (+) neuromuscular disease, headaches    GI/Hepatic/Renal      Endo/Other    Abdominal                    MEDICATIONS:     Current Outpatient Medications on File Prior to Encounter   Medication Sig Dispense Refill Last Dose    ALPRAZolam (XANAX) 0.5 MG tablet Take 1 tablet (0.5 mg total) by mouth daily as needed for Anxiety. 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 as needed for anxiety) 10 tablet 0 Past Week    aspirin 81 MG Chew Take 81 mg by mouth once daily.   9/5/2024    gabapentin (NEURONTIN) 300 MG capsule Take 1-2 capsules (300-600 mg total) by mouth 3 (three) times daily. 180 capsule 0 9/5/2024    olmesartan-amLODIPin-hcthiazid 40-10-25 mg Tab Take 1 tablet by mouth once daily. 90 tablet 2 9/5/2024    topiramate (TOPAMAX) 50 MG tablet Take 1 tablet (50 mg total) by mouth once daily. 90 tablet 1 9/5/2024      Inpatient Medications:  Antibiotics (From admission, onward)      None          VTE Risk Mitigation (From admission, onward)      None              No current facility-administered medications for this encounter.          History:   There are no hospital problems to display for this patient.    Surgical History:    has a past surgical history that includes Appendectomy (2004); [place] tooth extraction (2011); Epidural steroid injection (N/A, 7/21/2023); and Epidural steroid injection into lumbar spine (N/A, 4/23/2024).   Social History:    reports being sexually active and has had partner(s) who are male.  reports that he has never smoked. He has never used smokeless tobacco. He reports current alcohol use. He  "reports current drug use. Drug: Marijuana.    Vitals:    09/05/24 1252   BP: 115/62   Patient Position: Lying   Pulse: 76   Resp: 16   Temp: 36.6 °C (97.9 °F)   TempSrc: Temporal   SpO2: 97%   Weight: (!) 142.9 kg (315 lb)   Height: 6' 4" (1.93 m)     Vital Signs Range (Last 24H):  Temp:  [36.6 °C (97.9 °F)]   Pulse:  [76]   Resp:  [16]   BP: (115)/(62)   SpO2:  [97 %]     Body mass index is 38.34 kg/m².  Wt Readings from Last 4 Encounters:   09/05/24 (!) 142.9 kg (315 lb)   08/15/24 (!) 142.9 kg (315 lb)   08/12/24 (!) 142.7 kg (314 lb 9.5 oz)   07/26/24 (!) 143.1 kg (315 lb 7.7 oz)        Intake/Output - Last 3 Shifts       None          Lab Results   Component Value Date    WBC 6.77 04/26/2024    HGB 15.8 04/26/2024    HCT 46.0 04/26/2024     04/26/2024     04/26/2024    K 3.8 04/26/2024     04/26/2024    CREATININE 1.0 04/26/2024    BUN 16 04/26/2024    CO2 24 04/26/2024     (H) 04/26/2024    CALCIUM 9.3 04/26/2024    ALKPHOS 73 04/26/2024    ALT 43 04/26/2024    AST 18 04/26/2024    ALBUMIN 4.0 04/26/2024    HGBA1C 5.5 02/17/2023     No results found for this or any previous visit (from the past 12 hour(s)).  No results for input(s): "WBC", "HGB", "HCT", "PLT", "NA", "K", "CREATININE", "GLU", "INR", "LACTATE", "5HIAAPLASMA", "5HIAAURINT", "5HIAA", "9MAFO86IQ" in the last 168 hours.  No LMP for male patient.    EKG:   No results found for this or any previous visit.  TTE:  No results found for this or any previous visit.    YINA:  No results found for this or any previous visit.    Stress Test:  No results found for this or any previous visit.    No results found for this or any previous visit.    LHC:  No results found for this or any previous visit.    Cardiac Device Check   No results found for this or any previous visit.    No results found for this or any previous visit.        Pre-op Assessment    I have reviewed the Patient Summary Reports.     I have reviewed the Nursing Notes.  "   I have reviewed the Medications.     Review of Systems  Anesthesia Hx:  No problems with previous Anesthesia                Hematology/Oncology:  Hematology Normal   Oncology Normal                                   EENT/Dental:  EENT/Dental Normal           Cardiovascular:  Exercise tolerance: good   Hypertension                                  Hypertension         Pulmonary:  Pulmonary Normal                       Renal/:  Renal/ Normal                 Hepatic/GI:  Hepatic/GI Normal                 Musculoskeletal:  Musculoskeletal Normal                Neurological:    Neuromuscular Disease,  Headaches      Dx of Headaches                         Neuromuscular Disease   Endocrine:  Endocrine Normal            Dermatological:  Skin Normal    Psych:  Psychiatric Normal                    Physical Exam  General: Well nourished    Airway:  Mallampati: III / II  Mouth Opening: Normal  TM Distance: Normal  Tongue: Normal  Neck ROM: Normal ROM    Dental:  Intact        Anesthesia Plan  Type of Anesthesia, risks & benefits discussed:    Anesthesia Type: Gen ETT, Gen Natural Airway, MAC  Intra-op Monitoring Plan: Standard ASA Monitors  Post Op Pain Control Plan: multimodal analgesia and IV/PO Opioids PRN  Induction:  IV  Airway Plan: Direct and Video, Post-Induction  Informed Consent: Informed consent signed with the Patient and all parties understand the risks and agree with anesthesia plan.  All questions answered.   ASA Score: 3  Day of Surgery Review of History & Physical: H&P completed by Anesthesiologist.  Anesthesia Plan Notes: Chart reviewed, patient interviewed and examined.  The anesthetic plan was explained.  Risks, benefits, and alternatives were discussed. Questions were answered and the consent was signed.        HEATHER Garcia M.D.         Ready For Surgery From Anesthesia Perspective.     .

## 2024-09-05 NOTE — TRANSFER OF CARE
"Anesthesia Transfer of Care Note    Patient: Scott Gallagher    Procedure(s) Performed: Procedure(s) (LRB):  EGD (ESOPHAGOGASTRODUODENOSCOPY) (N/A)  COLONOSCOPY (N/A)    Patient location: [facility]    Anesthesia Type: general    Transport from OR: Transported from OR on room air with adequate spontaneous ventilation    Post pain: adequate analgesia    Post assessment: no apparent anesthetic complications and tolerated procedure well    Post vital signs: stable    Level of consciousness: awake, alert and oriented    Nausea/Vomiting: no nausea/vomiting    Complications: none    Transfer of care protocol was followed    Last vitals: Visit Vitals  BP (!) 103/55   Pulse 82   Temp 36.6 °C (97.9 °F) (Temporal)   Resp 14   Ht 6' 4" (1.93 m)   Wt (!) 142.9 kg (315 lb)   SpO2 100%   BMI 38.34 kg/m²     "

## 2024-09-10 LAB
FINAL PATHOLOGIC DIAGNOSIS: NORMAL
GROSS: NORMAL
Lab: NORMAL
MICROSCOPIC EXAM: NORMAL

## 2024-09-12 NOTE — ANESTHESIA POSTPROCEDURE EVALUATION
Anesthesia Post Evaluation    Patient: Scott Gallagher    Procedure(s) Performed: Procedure(s) (LRB):  EGD (ESOPHAGOGASTRODUODENOSCOPY) (N/A)  COLONOSCOPY (N/A)    Final Anesthesia Type: general      Patient location during evaluation: PACU  Patient participation: Yes- Able to Participate  Level of consciousness: awake and alert  Post-procedure vital signs: reviewed and stable  Pain management: adequate  Airway patency: patent    PONV status at discharge: No PONV  Anesthetic complications: no      Cardiovascular status: blood pressure returned to baseline  Respiratory status: unassisted  Hydration status: euvolemic  Follow-up not needed.              Vitals Value Taken Time   /57 09/05/24 1500   Temp 37 09/12/24 1729   Pulse 63 09/05/24 1515   Resp 14 09/05/24 1515   SpO2 94 % 09/05/24 1515         No case tracking events are documented in the log.      Pain/Jasmeet Score: No data recorded

## 2024-09-18 ENCOUNTER — OFFICE VISIT (OUTPATIENT)
Dept: ORTHOPEDICS | Facility: CLINIC | Age: 34
End: 2024-09-18
Payer: COMMERCIAL

## 2024-09-18 ENCOUNTER — HOSPITAL ENCOUNTER (OUTPATIENT)
Dept: CARDIOLOGY | Facility: CLINIC | Age: 34
Discharge: HOME | End: 2024-09-18
Payer: COMMERCIAL

## 2024-09-18 ENCOUNTER — TELEPHONE (OUTPATIENT)
Dept: ADMINISTRATIVE | Facility: HOSPITAL | Age: 34
End: 2024-09-18
Payer: COMMERCIAL

## 2024-09-18 ENCOUNTER — PATIENT MESSAGE (OUTPATIENT)
Dept: NEUROLOGY | Facility: CLINIC | Age: 34
End: 2024-09-18
Payer: COMMERCIAL

## 2024-09-18 ENCOUNTER — LAB VISIT (OUTPATIENT)
Dept: LAB | Facility: HOSPITAL | Age: 34
End: 2024-09-18
Attending: ANESTHESIOLOGY
Payer: COMMERCIAL

## 2024-09-18 VITALS — BODY MASS INDEX: 37.32 KG/M2 | WEIGHT: 306.44 LBS | HEIGHT: 76 IN

## 2024-09-18 VITALS — DIASTOLIC BLOOD PRESSURE: 85 MMHG | SYSTOLIC BLOOD PRESSURE: 125 MMHG

## 2024-09-18 DIAGNOSIS — M51.16 LUMBAR DISC HERNIATION WITH RADICULOPATHY: Primary | ICD-10-CM

## 2024-09-18 DIAGNOSIS — Z01.818 PREOPERATIVE TESTING: ICD-10-CM

## 2024-09-18 DIAGNOSIS — Z29.81 ENCOUNTER FOR HIV PRE-EXPOSURE PROPHYLAXIS: ICD-10-CM

## 2024-09-18 DIAGNOSIS — Z79.899 ENCOUNTER FOR LONG-TERM (CURRENT) USE OF HIGH-RISK MEDICATION: ICD-10-CM

## 2024-09-18 DIAGNOSIS — M51.36 DDD (DEGENERATIVE DISC DISEASE), LUMBAR: ICD-10-CM

## 2024-09-18 LAB
ABO + RH BLD: NORMAL
ANION GAP SERPL CALC-SCNC: 11 MMOL/L (ref 8–16)
APTT PPP: 29 SEC (ref 21–32)
BLD GP AB SCN CELLS X3 SERPL QL: NORMAL
BUN SERPL-MCNC: 10 MG/DL (ref 6–20)
CALCIUM SERPL-MCNC: 9.7 MG/DL (ref 8.7–10.5)
CHLORIDE SERPL-SCNC: 105 MMOL/L (ref 95–110)
CHOLEST SERPL-MCNC: 225 MG/DL (ref 120–199)
CHOLEST/HDLC SERPL: 8 {RATIO} (ref 2–5)
CO2 SERPL-SCNC: 22 MMOL/L (ref 23–29)
CREAT SERPL-MCNC: 0.9 MG/DL (ref 0.5–1.4)
ERYTHROCYTE [DISTWIDTH] IN BLOOD BY AUTOMATED COUNT: 13 % (ref 11.5–14.5)
EST. GFR  (NO RACE VARIABLE): >60 ML/MIN/1.73 M^2
GLUCOSE SERPL-MCNC: 90 MG/DL (ref 70–110)
HCT VFR BLD AUTO: 44.9 % (ref 40–54)
HDLC SERPL-MCNC: 28 MG/DL (ref 40–75)
HDLC SERPL: 12.4 % (ref 20–50)
HGB BLD-MCNC: 16.2 G/DL (ref 14–18)
HIV 1+2 AB+HIV1 P24 AG SERPL QL IA: NORMAL
INR PPP: 0.9 (ref 0.8–1.2)
LDLC SERPL CALC-MCNC: 124.4 MG/DL (ref 63–159)
MCH RBC QN AUTO: 30.5 PG (ref 27–31)
MCHC RBC AUTO-ENTMCNC: 36.1 G/DL (ref 32–36)
MCV RBC AUTO: 84 FL (ref 82–98)
NONHDLC SERPL-MCNC: 197 MG/DL
OHS QRS DURATION: 86 MS
OHS QTC CALCULATION: 415 MS
PLATELET # BLD AUTO: 298 K/UL (ref 150–450)
PMV BLD AUTO: 11.1 FL (ref 9.2–12.9)
POTASSIUM SERPL-SCNC: 3.3 MMOL/L (ref 3.5–5.1)
PROTHROMBIN TIME: 10.2 SEC (ref 9–12.5)
RBC # BLD AUTO: 5.32 M/UL (ref 4.6–6.2)
SODIUM SERPL-SCNC: 138 MMOL/L (ref 136–145)
SPECIMEN OUTDATE: NORMAL
TREPONEMA PALLIDUM IGG+IGM AB [PRESENCE] IN SERUM OR PLASMA BY IMMUNOASSAY: NONREACTIVE
TRIGL SERPL-MCNC: 363 MG/DL (ref 30–150)
WBC # BLD AUTO: 7.29 K/UL (ref 3.9–12.7)

## 2024-09-18 PROCEDURE — 93010 ELECTROCARDIOGRAM REPORT: CPT | Mod: S$GLB,,, | Performed by: INTERNAL MEDICINE

## 2024-09-18 PROCEDURE — 80048 BASIC METABOLIC PNL TOTAL CA: CPT | Performed by: ANESTHESIOLOGY

## 2024-09-18 PROCEDURE — 85610 PROTHROMBIN TIME: CPT | Performed by: ANESTHESIOLOGY

## 2024-09-18 PROCEDURE — 86900 BLOOD TYPING SEROLOGIC ABO: CPT | Performed by: ANESTHESIOLOGY

## 2024-09-18 PROCEDURE — 85730 THROMBOPLASTIN TIME PARTIAL: CPT | Performed by: ANESTHESIOLOGY

## 2024-09-18 PROCEDURE — 87536 HIV-1 QUANT&REVRSE TRNSCRPJ: CPT | Performed by: STUDENT IN AN ORGANIZED HEALTH CARE EDUCATION/TRAINING PROGRAM

## 2024-09-18 PROCEDURE — 80061 LIPID PANEL: CPT | Performed by: STUDENT IN AN ORGANIZED HEALTH CARE EDUCATION/TRAINING PROGRAM

## 2024-09-18 PROCEDURE — 86593 SYPHILIS TEST NON-TREP QUANT: CPT | Performed by: STUDENT IN AN ORGANIZED HEALTH CARE EDUCATION/TRAINING PROGRAM

## 2024-09-18 PROCEDURE — 86901 BLOOD TYPING SEROLOGIC RH(D): CPT | Performed by: ANESTHESIOLOGY

## 2024-09-18 PROCEDURE — 86850 RBC ANTIBODY SCREEN: CPT | Performed by: ANESTHESIOLOGY

## 2024-09-18 PROCEDURE — 99999 PR PBB SHADOW E&M-EST. PATIENT-LVL III: CPT | Mod: PBBFAC,,, | Performed by: ORTHOPAEDIC SURGERY

## 2024-09-18 PROCEDURE — 85027 COMPLETE CBC AUTOMATED: CPT | Performed by: ANESTHESIOLOGY

## 2024-09-18 PROCEDURE — 87389 HIV-1 AG W/HIV-1&-2 AB AG IA: CPT | Performed by: STUDENT IN AN ORGANIZED HEALTH CARE EDUCATION/TRAINING PROGRAM

## 2024-09-18 PROCEDURE — 93005 ELECTROCARDIOGRAM TRACING: CPT | Mod: S$GLB,,, | Performed by: ANESTHESIOLOGY

## 2024-09-18 PROCEDURE — 36415 COLL VENOUS BLD VENIPUNCTURE: CPT | Performed by: ANESTHESIOLOGY

## 2024-09-18 NOTE — H&P (VIEW-ONLY)
"DATE: 9/19/2024  PATIENT: Scott Gallagher    Attending Physician: Raleigh Bond M.D.    CHIEF COMPLAINT: LBP and LLE pain    HISTORY:  Scott Gallagher is a 34 y.o. male w/ a hx of hemorrhagic stroke (May 2021) with 4 episodes of focal dystonia (left hand and speech when it occurs) presents for initial evaluation of low back and left leg pain (Back - 4, Leg - 4). The pain has been present for 3 years. The patient describes the pain as dull and it radiates posterolaterally down LLE to the ankle.  The pain is worse with activity and improved by rest. There is no associated numbness and tingling. There is no subjective weakness. He reports that he has some flare ups from his back pain where he cannot work, cannot wipe, and has difficulty functioning overall. He has tired PT, injections, and oral medications (still taking gabapentin 300 BID) with no relief of his pain, but reports that the gabapentin can "keep the pain at bay." Patient is miserable and wants surgical intervention.    The Patient denies myelopathic symptoms such as handwriting changes or difficulty with buttons/coins/keys. Denies perineal paresthesias, bowel/bladder dysfunction.    The patient does not smoke, have DM or endorse IVDU. The patient is not on any blood thinners and does not take chronic narcotics. 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 He lives at home with his fiance for support post-operatively.     PAST MEDICAL/SURGICAL HISTORY:  Past Medical History:   Diagnosis Date    Hypertension      Past Surgical History:   Procedure Laterality Date    APPENDECTOMY  2004    COLONOSCOPY N/A 9/5/2024    Procedure: COLONOSCOPY;  Surgeon: Rahul Valentine MD;  Location: [facility] ENDO ([address] [place] [address]);  Service: Endoscopy;  Laterality: N/A;    EPIDURAL STEROID INJECTION N/A 7/21/2023    Procedure: L4-5 LESI (toward the left);  Surgeon: Dom Jha DO;  Location: [facility] PAIN MANAGEMENT;  Service: Pain Management;  " Laterality: N/A;  oral sedation    EPIDURAL STEROID INJECTION INTO LUMBAR SPINE N/A 4/23/2024    Procedure: L4-5 Lumbar SUSANNA (toward the left - long needle);  Surgeon: Dom Jha DO;  Location: [facility] PAIN MANAGEMENT;  Service: Pain Management;  Laterality: N/A;  15mins-Oral Sed    ESOPHAGOGASTRODUODENOSCOPY N/A 9/5/2024    Procedure: EGD (ESOPHAGOGASTRODUODENOSCOPY);  Surgeon: Rahul Valentine MD;  [facility]  Service: Endoscopy;  Laterality: N/A;  Referred by: Dr. 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, golytely, instructions to portal-[facility]  8/28 pre call attempted, no answer-[facility]  9/4-pt cannot come in earlier than 12:15pm, precall complete-[facility]    WISDOM TOOTH EXTRACTION  2011       Current Medications:   Current Outpatient Medications:     ALPRAZolam (XANAX) 0.5 MG tablet, Take 1 tablet (0.5 mg total) by mouth daily as needed for Anxiety. 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 as needed for anxiety), Disp: 10 tablet, Rfl: 0    aspirin 81 MG Chew, Take 81 mg by mouth once daily., Disp: , Rfl:     olmesartan-amLODIPin-hcthiazid 40-10-25 mg Tab, Take 1 tablet by mouth once daily., Disp: 90 tablet, Rfl: 2    propranoloL (INDERAL) 10 MG tablet, Take 10 mg by mouth 3 (three) times daily as needed., Disp: , Rfl:     gabapentin (NEURONTIN) 300 MG capsule, Take 1-2 capsules (300-600 mg total) by mouth 3 (three) times daily., Disp: 180 capsule, Rfl: 0    topiramate (TOPAMAX) 50 MG tablet, Take 1 tablet (50 mg total) by mouth once daily., Disp: 90 tablet, Rfl: 1    Current Facility-Administered Medications:     Hep A (Havrix) IM vaccine (>/= 20 yo), 1,440 Units, Intramuscular, Q6 Months,     VFC-hpv vaccine,9-robi (GARDASIL 9) vaccine 0.5 mL, 0.5 mL, Intramuscular, vaccine x 1 dose,     Social History:   Social History     Socioeconomic History     Marital status: Single   Occupational History    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   Tobacco Use    Smoking status: Never    Smokeless tobacco: Never   Substance and Sexual Activity    Alcohol use: Yes    Drug use: Yes     Types: Marijuana     Comment: very rarely used, edible    Sexual activity: Yes     Partners: Male     Comment: boyfriend     Social Determinants of Health     Financial Resource Strain: Medium Risk (3/26/2024)    Overall Financial Resource Strain (CARDIA)     Difficulty of Paying Living Expenses: Somewhat hard   Food Insecurity: Food Insecurity Present (3/26/2024)    Hunger Vital Sign     Worried About Running Out of Food in the Last Year: Sometimes true     Ran Out of Food in the Last Year: Never true   Transportation Needs: No Transportation Needs (3/26/2024)    PRAPARE - Transportation     Lack of Transportation (Medical): No     Lack of Transportation (Non-Medical): No   Physical Activity: Unknown (3/26/2024)    Exercise Vital Sign     Days of Exercise per Week: Patient declined   Stress: Stress Concern Present (3/26/2024)    [employer] [place] [employer] - Occupational Stress Questionnaire     Feeling of Stress : To some extent   Housing Stability: Low Risk  (3/26/2024)    Housing Stability Vital Sign     Unable to Pay for Housing in the Last Year: No     Number of Places Lived in the Last Year: 1     Unstable Housing in the Last Year: No       REVIEW OF SYSTEMS:  Constitution: Negative. Negative for chills, fever and night sweats.   Cardiovascular: Negative for chest pain and syncope.   Respiratory: Negative for cough and shortness of breath.   Gastrointestinal: See HPI. Negative for nausea/vomiting. Negative for abdominal pain.  Genitourinary: See HPI. Negative for discoloration or dysuria.  Hematologic/Lymphatic: negative for bleeding/clotting disorders.   Musculoskeletal: Negative for falls and muscle weakness.   Neurological: See HPI. no history of seizures. no history  "of cranial surgery or shunts.  Neurological: See HPI. No seizures.   Endocrine: Negative for polydipsia, polyphagia and polyuria.   Allergic/Immunologic: Negative for hives and persistent infections.     EXAM:  Ht 6' 4" (1.93 m)   Wt (!) 139 kg (306 lb 7 oz)   BMI 37.30 kg/m²     PHYSICAL EXAMINATION:    General: The patient is a 34 y.o. male in no apparent distress, the patient is orientatied to person, place and time.  Psych: Normal mood and affect  HEENT: Vision grossly intact, hearing intact to the spoken word.  Lungs: Respirations unlabored.  Gait: Normal station and gait, no difficulty with toe or heel walk.   Skin: Dorsal lumbar skin negative for rashes, lesions, hairy patches and surgical scars. There is lower lumbar tenderness to palpation.  Range of motion: Lumbar range of motion is acceptable.  Spinal Balance: Global saggital and coronal spinal balance acceptable, no significant for scoliosis and kyphosis.  Musculoskeletal: No pain with the range of motion of the bilateral hips. No trochanteric tenderness to palpation.  Vascular: Bilateral lower extremities warm and well perfused, Dorsalis pedis pulses 2+ bilaterally.  Neurological: Normal strength and tone in all major motor groups in the bilateral lower extremities. Normal sensation to light touch in the L2-S1 dermatomes bilaterally.  Deep tendon reflexes symmetric 2+ in the bilateral lower extremities.  Negative Babinski bilaterally. Straight leg raise negative bilaterally.    IMAGING:   Today I independently reviewed the following images and my interpretations are as follows:    AP, Lat and Flex/Ex  upright L-spine demonstrate mild DDD.    Lumbar MRI showed L4-5 left paracentral HNP (progressed compared to 2023 MRI). L5-S1 disc bulge.      Body mass index is 37.3 kg/m².  Hemoglobin A1C   Date Value Ref Range Status   02/17/2023 5.5 4.0 - 5.6 % Final     Comment:     ADA Screening Guidelines:  5.7-6.4%  Consistent with prediabetes  >or=6.5%  " Consistent with diabetes    High levels of fetal hemoglobin interfere with the HbA1C  assay. Heterozygous hemoglobin variants (HbS, HgC, etc)do  not significantly interfere with this assay.   However, presence of multiple variants may affect accuracy.     12/05/2019 4.6 4.0 - 5.6 % Final     Comment:     ADA Screening Guidelines:  5.7-6.4%  Consistent with prediabetes  >or=6.5%  Consistent with diabetes  High levels of fetal hemoglobin interfere with the HbA1C  assay. Heterozygous hemoglobin variants (HbS, HgC, etc)do  not significantly interfere with this assay.   However, presence of multiple variants may affect accuracy.         ASSESSMENT/PLAN:    Scott was seen today for back pain.    Diagnoses and all orders for this visit:    Lumbar disc herniation with radiculopathy    DDD (degenerative disc disease), lumbar      No follow-ups on file.    Patient has L4-5 L paracentral HNP with LLE radiculopathy. I discussed the natural history of their diagnoses as well as surgical and nonsurgical treatment options. I educated the patient on the importance of core/back strengthening, correct posture, bending/lifting ergonomics, and low-impact aerobic exercises (walking, elliptical, and aquatherapy). Continue medications. Patient has failed conservative management and is a candidate for L4-5 microdisc.    I had a sit down discussion with the patient regarding the above surgery. We specifically discussed the risks, benefits, and alternatives to surgery. We discussed the surgical procedure including the skin incision, discectomy and nerve decompression: they understand the risks include but are not limited to bleeding, infection, damage to arteries, veins and nerves, re-herniation, death, paralysis, blindness, spinal fluid leak, continued or worsening pain, the possible need for more surgery in the future as well as the possibility other unforseen and unknown complications. We talked about expected hospital stay and recovery  period. All questions were answered; they understand and wish to proceed.     I have personally examined the patient and agree with the above plan.    Raleigh Bond MD  Orthopaedic Spine Surgeon  Department of Orthopaedic Surgery  [phone]

## 2024-09-18 NOTE — TELEPHONE ENCOUNTER
Pt report bp reading taken on 09/18/24.    Enriqueta Meza  Panel Care Coordinator  [facility]  P: [phone]  F: [phone]

## 2024-09-18 NOTE — PROGRESS NOTES
"DATE: 9/19/2024  PATIENT: Scott Gallagher    Attending Physician: Raleigh Bond M.D.    CHIEF COMPLAINT: LBP and LLE pain    HISTORY:  Scott Gallagher is a 34 y.o. male w/ a hx of hemorrhagic stroke (May 2021) with 4 episodes of focal dystonia (left hand and speech when it occurs) presents for initial evaluation of low back and left leg pain (Back - 4, Leg - 4). The pain has been present for 3 years. The patient describes the pain as dull and it radiates posterolaterally down LLE to the ankle.  The pain is worse with activity and improved by rest. There is no associated numbness and tingling. There is no subjective weakness. He reports that he has some flare ups from his back pain where he cannot work, cannot wipe, and has difficulty functioning overall. He has tired PT, injections, and oral medications (still taking gabapentin 300 BID) with no relief of his pain, but reports that the gabapentin can "keep the pain at bay." Patient is miserable and wants surgical intervention.    The Patient denies myelopathic symptoms such as handwriting changes or difficulty with buttons/coins/keys. Denies perineal paresthesias, bowel/bladder dysfunction.    The patient does not smoke, have DM or endorse IVDU. The patient is not on any blood thinners and does not take chronic narcotics. 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 He lives at home with his fiance for support post-operatively.     PAST MEDICAL/SURGICAL HISTORY:  Past Medical History:   Diagnosis Date    Hypertension      Past Surgical History:   Procedure Laterality Date    APPENDECTOMY  2004    COLONOSCOPY N/A 9/5/2024    Procedure: COLONOSCOPY;  Surgeon: Rahul Valentine MD;  Location: [facility] ENDO ([address] [place] [address]);  Service: Endoscopy;  Laterality: N/A;    EPIDURAL STEROID INJECTION N/A 7/21/2023    Procedure: L4-5 LESI (toward the left);  Surgeon: Dom Jha DO;  Location: [facility] PAIN MANAGEMENT;  Service: Pain Management;  " Laterality: N/A;  oral sedation    EPIDURAL STEROID INJECTION INTO LUMBAR SPINE N/A 4/23/2024    Procedure: L4-5 Lumbar SUSANNA (toward the left - long needle);  Surgeon: Dom Jha DO;  Location: [facility] PAIN MANAGEMENT;  Service: Pain Management;  Laterality: N/A;  15mins-Oral Sed    ESOPHAGOGASTRODUODENOSCOPY N/A 9/5/2024    Procedure: EGD (ESOPHAGOGASTRODUODENOSCOPY);  Surgeon: Rahul Valentine MD;  [facility]  Service: Endoscopy;  Laterality: N/A;  Referred by: Dr. 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, golytely, instructions to portal-[facility]  8/28 pre call attempted, no answer-[facility]  9/4-pt cannot come in earlier than 12:15pm, precall complete-[facility]    WISDOM TOOTH EXTRACTION  2011       Current Medications:   Current Outpatient Medications:     ALPRAZolam (XANAX) 0.5 MG tablet, Take 1 tablet (0.5 mg total) by mouth daily as needed for Anxiety. 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 as needed for anxiety), Disp: 10 tablet, Rfl: 0    aspirin 81 MG Chew, Take 81 mg by mouth once daily., Disp: , Rfl:     olmesartan-amLODIPin-hcthiazid 40-10-25 mg Tab, Take 1 tablet by mouth once daily., Disp: 90 tablet, Rfl: 2    propranoloL (INDERAL) 10 MG tablet, Take 10 mg by mouth 3 (three) times daily as needed., Disp: , Rfl:     gabapentin (NEURONTIN) 300 MG capsule, Take 1-2 capsules (300-600 mg total) by mouth 3 (three) times daily., Disp: 180 capsule, Rfl: 0    topiramate (TOPAMAX) 50 MG tablet, Take 1 tablet (50 mg total) by mouth once daily., Disp: 90 tablet, Rfl: 1    Current Facility-Administered Medications:     Hep A (Havrix) IM vaccine (>/= 18 yo), 1,440 Units, Intramuscular, Q6 Months,     VFC-hpv vaccine,9-robi (GARDASIL 9) vaccine 0.5 mL, 0.5 mL, Intramuscular, vaccine x 1 dose,     Social History:   Social History     Socioeconomic History     Marital status: Single   Occupational History    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   Tobacco Use    Smoking status: Never    Smokeless tobacco: Never   Substance and Sexual Activity    Alcohol use: Yes    Drug use: Yes     Types: Marijuana     Comment: very rarely used, edible    Sexual activity: Yes     Partners: Male     Comment: boyfriend     Social Determinants of Health     Financial Resource Strain: Medium Risk (3/26/2024)    Overall Financial Resource Strain (CARDIA)     Difficulty of Paying Living Expenses: Somewhat hard   Food Insecurity: Food Insecurity Present (3/26/2024)    Hunger Vital Sign     Worried About Running Out of Food in the Last Year: Sometimes true     Ran Out of Food in the Last Year: Never true   Transportation Needs: No Transportation Needs (3/26/2024)    PRAPARE - Transportation     Lack of Transportation (Medical): No     Lack of Transportation (Non-Medical): No   Physical Activity: Unknown (3/26/2024)    Exercise Vital Sign     Days of Exercise per Week: Patient declined   Stress: Stress Concern Present (3/26/2024)    [employer] - Occupational Stress Questionnaire     Feeling of Stress : To some extent   Housing Stability: Low Risk  (3/26/2024)    Housing Stability Vital Sign     Unable to Pay for Housing in the Last Year: No     Number of Places Lived in the Last Year: 1     Unstable Housing in the Last Year: No       REVIEW OF SYSTEMS:  Constitution: Negative. Negative for chills, fever and night sweats.   Cardiovascular: Negative for chest pain and syncope.   Respiratory: Negative for cough and shortness of breath.   Gastrointestinal: See HPI. Negative for nausea/vomiting. Negative for abdominal pain.  Genitourinary: See HPI. Negative for discoloration or dysuria.  Hematologic/Lymphatic: negative for bleeding/clotting disorders.   Musculoskeletal: Negative for falls and muscle weakness.   Neurological: See HPI. no history of seizures. no history  "of cranial surgery or shunts.  Neurological: See HPI. No seizures.   Endocrine: Negative for polydipsia, polyphagia and polyuria.   Allergic/Immunologic: Negative for hives and persistent infections.     EXAM:  Ht 6' 4" (1.93 m)   Wt (!) 139 kg (306 lb 7 oz)   BMI 37.30 kg/m²     PHYSICAL EXAMINATION:    General: The patient is a 34 y.o. male in no apparent distress, the patient is orientatied to person, place and time.  Psych: Normal mood and affect  HEENT: Vision grossly intact, hearing intact to the spoken word.  Lungs: Respirations unlabored.  Gait: Normal station and gait, no difficulty with toe or heel walk.   Skin: Dorsal lumbar skin negative for rashes, lesions, hairy patches and surgical scars. There is lower lumbar tenderness to palpation.  Range of motion: Lumbar range of motion is acceptable.  Spinal Balance: Global saggital and coronal spinal balance acceptable, no significant for scoliosis and kyphosis.  Musculoskeletal: No pain with the range of motion of the bilateral hips. No trochanteric tenderness to palpation.  Vascular: Bilateral lower extremities warm and well perfused, Dorsalis pedis pulses 2+ bilaterally.  Neurological: Normal strength and tone in all major motor groups in the bilateral lower extremities. Normal sensation to light touch in the L2-S1 dermatomes bilaterally.  Deep tendon reflexes symmetric 2+ in the bilateral lower extremities.  Negative Babinski bilaterally. Straight leg raise negative bilaterally.    IMAGING:   Today I independently reviewed the following images and my interpretations are as follows:    AP, Lat and Flex/Ex  upright L-spine demonstrate mild DDD.    Lumbar MRI showed L4-5 left paracentral HNP (progressed compared to 2023 MRI). L5-S1 disc bulge.      Body mass index is 37.3 kg/m².  Hemoglobin A1C   Date Value Ref Range Status   02/17/2023 5.5 4.0 - 5.6 % Final     Comment:     ADA Screening Guidelines:  5.7-6.4%  Consistent with prediabetes  >or=6.5%  " Consistent with diabetes    High levels of fetal hemoglobin interfere with the HbA1C  assay. Heterozygous hemoglobin variants (HbS, HgC, etc)do  not significantly interfere with this assay.   However, presence of multiple variants may affect accuracy.     12/05/2019 4.6 4.0 - 5.6 % Final     Comment:     ADA Screening Guidelines:  5.7-6.4%  Consistent with prediabetes  >or=6.5%  Consistent with diabetes  High levels of fetal hemoglobin interfere with the HbA1C  assay. Heterozygous hemoglobin variants (HbS, HgC, etc)do  not significantly interfere with this assay.   However, presence of multiple variants may affect accuracy.         ASSESSMENT/PLAN:    Scott was seen today for back pain.    Diagnoses and all orders for this visit:    Lumbar disc herniation with radiculopathy    DDD (degenerative disc disease), lumbar      No follow-ups on file.    Patient has L4-5 L paracentral HNP with LLE radiculopathy. I discussed the natural history of their diagnoses as well as surgical and nonsurgical treatment options. I educated the patient on the importance of core/back strengthening, correct posture, bending/lifting ergonomics, and low-impact aerobic exercises (walking, elliptical, and aquatherapy). Continue medications. Patient has failed conservative management and is a candidate for L4-5 microdisc.    I had a sit down discussion with the patient regarding the above surgery. We specifically discussed the risks, benefits, and alternatives to surgery. We discussed the surgical procedure including the skin incision, discectomy and nerve decompression: they understand the risks include but are not limited to bleeding, infection, damage to arteries, veins and nerves, re-herniation, death, paralysis, blindness, spinal fluid leak, continued or worsening pain, the possible need for more surgery in the future as well as the possibility other unforseen and unknown complications. We talked about expected hospital stay and recovery  period. All questions were answered; they understand and wish to proceed.     I have personally examined the patient and agree with the above plan.    Raleigh Bond MD  Orthopaedic Spine Surgeon  Department of Orthopaedic Surgery  [phone]

## 2024-09-19 ENCOUNTER — TELEPHONE (OUTPATIENT)
Dept: GASTROENTEROLOGY | Facility: CLINIC | Age: 34
End: 2024-09-19
Payer: COMMERCIAL

## 2024-09-19 ENCOUNTER — PATIENT MESSAGE (OUTPATIENT)
Dept: GASTROENTEROLOGY | Facility: CLINIC | Age: 34
End: 2024-09-19
Payer: COMMERCIAL

## 2024-09-19 LAB
HIV1 RNA # SERPL NAA+PROBE: NOT DETECTED COPIES/ML
HIV1 RNA SERPL QL NAA+PROBE: NOT DETECTED

## 2024-09-19 NOTE — TELEPHONE ENCOUNTER
Attempted to contact the patient several times to schedule follow appt with KAM Randolph per Dr. Valentine request. Patient didn't answer, I left a detailed message and sent patient portal message.     ----- Message from Rahul Valentine MD sent at 9/19/2024  8:06 AM CDT -----  Appointment with Mandy for follow-up please.

## 2024-09-23 DIAGNOSIS — Z29.81 ENCOUNTER FOR HIV PRE-EXPOSURE PROPHYLAXIS: Primary | ICD-10-CM

## 2024-09-23 RX ORDER — EMTRICITABINE AND TENOFOVIR DISOPROXIL FUMARATE 200; 300 MG/1; MG/1
1 TABLET, FILM COATED ORAL DAILY
Qty: 30 TABLET | Refills: 2 | Status: SHIPPED | OUTPATIENT
Start: 2024-09-23 | End: 2024-12-22

## 2024-09-24 ENCOUNTER — OFFICE VISIT (OUTPATIENT)
Dept: INTERNAL MEDICINE | Facility: CLINIC | Age: 34
End: 2024-09-24
Attending: FAMILY MEDICINE
Payer: COMMERCIAL

## 2024-09-24 ENCOUNTER — CLINICAL SUPPORT (OUTPATIENT)
Dept: INFECTIOUS DISEASES | Facility: CLINIC | Age: 34
End: 2024-09-24
Payer: COMMERCIAL

## 2024-09-24 VITALS
BODY MASS INDEX: 37.18 KG/M2 | OXYGEN SATURATION: 95 % | WEIGHT: 305.31 LBS | HEIGHT: 76 IN | DIASTOLIC BLOOD PRESSURE: 68 MMHG | HEART RATE: 84 BPM | SYSTOLIC BLOOD PRESSURE: 118 MMHG

## 2024-09-24 DIAGNOSIS — Z00.00 HEALTHCARE MAINTENANCE: Primary | ICD-10-CM

## 2024-09-24 DIAGNOSIS — M51.16 LUMBAR DISC HERNIATION WITH RADICULOPATHY: ICD-10-CM

## 2024-09-24 DIAGNOSIS — I10 HYPERTENSION, ESSENTIAL: Primary | ICD-10-CM

## 2024-09-24 PROCEDURE — 90651 9VHPV VACCINE 2/3 DOSE IM: CPT | Mod: S$GLB,,, | Performed by: STUDENT IN AN ORGANIZED HEALTH CARE EDUCATION/TRAINING PROGRAM

## 2024-09-24 PROCEDURE — 1159F MED LIST DOCD IN RCRD: CPT | Mod: CPTII,S$GLB,, | Performed by: FAMILY MEDICINE

## 2024-09-24 PROCEDURE — 4010F ACE/ARB THERAPY RXD/TAKEN: CPT | Mod: CPTII,S$GLB,, | Performed by: FAMILY MEDICINE

## 2024-09-24 PROCEDURE — 3078F DIAST BP <80 MM HG: CPT | Mod: CPTII,S$GLB,, | Performed by: FAMILY MEDICINE

## 2024-09-24 PROCEDURE — G2211 COMPLEX E/M VISIT ADD ON: HCPCS | Mod: S$GLB,,, | Performed by: FAMILY MEDICINE

## 2024-09-24 PROCEDURE — 90472 IMMUNIZATION ADMIN EACH ADD: CPT | Mod: S$GLB,,, | Performed by: STUDENT IN AN ORGANIZED HEALTH CARE EDUCATION/TRAINING PROGRAM

## 2024-09-24 PROCEDURE — 3008F BODY MASS INDEX DOCD: CPT | Mod: CPTII,S$GLB,, | Performed by: FAMILY MEDICINE

## 2024-09-24 PROCEDURE — 99999 PR PBB SHADOW E&M-EST. PATIENT-LVL I: CPT | Mod: PBBFAC,,,

## 2024-09-24 PROCEDURE — 90471 IMMUNIZATION ADMIN: CPT | Mod: S$GLB,,, | Performed by: STUDENT IN AN ORGANIZED HEALTH CARE EDUCATION/TRAINING PROGRAM

## 2024-09-24 PROCEDURE — 3074F SYST BP LT 130 MM HG: CPT | Mod: CPTII,S$GLB,, | Performed by: FAMILY MEDICINE

## 2024-09-24 PROCEDURE — 1160F RVW MEDS BY RX/DR IN RCRD: CPT | Mod: CPTII,S$GLB,, | Performed by: FAMILY MEDICINE

## 2024-09-24 PROCEDURE — 90632 HEPA VACCINE ADULT IM: CPT | Mod: S$GLB,,, | Performed by: STUDENT IN AN ORGANIZED HEALTH CARE EDUCATION/TRAINING PROGRAM

## 2024-09-24 PROCEDURE — 99999 PR PBB SHADOW E&M-EST. PATIENT-LVL III: CPT | Mod: PBBFAC,,, | Performed by: FAMILY MEDICINE

## 2024-09-24 PROCEDURE — 99214 OFFICE O/P EST MOD 30 MIN: CPT | Mod: S$GLB,,, | Performed by: FAMILY MEDICINE

## 2024-09-24 RX ORDER — ACETAMINOPHEN 500 MG
500 TABLET ORAL 3 TIMES DAILY
Qty: 90 TABLET | Refills: 0 | Status: SHIPPED | OUTPATIENT
Start: 2024-09-24

## 2024-09-24 RX ORDER — METHOCARBAMOL 500 MG/1
1000 TABLET, FILM COATED ORAL 3 TIMES DAILY
Qty: 90 TABLET | Refills: 0 | Status: SHIPPED | OUTPATIENT
Start: 2024-09-24

## 2024-09-24 RX ORDER — OXYCODONE HYDROCHLORIDE 5 MG/1
5 TABLET ORAL EVERY 6 HOURS PRN
Qty: 21 TABLET | Refills: 0 | Status: SHIPPED | OUTPATIENT
Start: 2024-09-24

## 2024-09-24 NOTE — PROGRESS NOTES
"CHIEF COMPLAINT:  Preop    HISTORY OF PRESENT ILLNESS: The patient is a 34 year-old WM.  Needs clearance for lami and discectomy.    The patient is Healthy except ruptured aneurysm 2021 d/t HTN.    He also has Left sciatica for several months.    He sees CRS for significant enterofistula.    ED responds nicely to viagra prn    It has been a while since the patient has seen a primary care physician.  The patient wishes to establish a primary care physician.  The patient would also like to get some basic blood work done.    REVIEW OF SYSTEMS:  GENERAL: No fever, chills, fatigability or weight loss.  SKIN: No rashes, itching or changes in color or texture of skin.  HEAD: No headaches or recent head trauma.  EYES: Visual acuity fine. No photophobia, ocular pain or diplopia.  EARS: Denies ear pain, discharge or vertigo.  NOSE: No loss of smell, no epistaxis or postnasal drip.  MOUTH & THROAT: No hoarseness or change in voice. No excessive gum bleeding.  NODES: Denies swollen glands.  CHEST: Denies CUMMINGS, cyanosis, wheezing, cough and sputum production.  CARDIOVASCULAR: Denies chest pain, PND, orthopnea or reduced exercise tolerance.  ABDOMEN: Appetite fine. No weight loss. Denies diarrhea, abdominal pain, hematemesis.  URINARY: No flank pain, dysuria or hematuria.  PERIPHERAL VASCULAR: No claudication or cyanosis.  MUSCULOSKELETAL: No joint stiffness or swelling.   NEUROLOGIC: No history of seizures, paralysis, alteration of gait or coordination.    SOCIAL HISTORY: The patient does not smoke.  The patient consumes alcohol socially.  The patient is single.    PHYSICAL EXAMINATION:   Blood pressure 118/68, pulse 84, height 6' 4" (1.93 m), weight (!) 138.5 kg (305 lb 5.4 oz), SpO2 95%.  APPEARANCE: Well nourished, well developed, in no acute distress.    HEAD: Normocephalic, atraumatic.  EYES: PERRL. EOMI.  Conjunctivae without injection and  anicteric  NOSE: Mucosa pink. Airway clear.  MOUTH & THROAT: No tonsillar " enlargement. No pharyngeal erythema or exudate. No stridor.  NECK: Supple.   NODES: No cervical, axillary or inguinal lymph node enlargement.  CHEST: Lungs clear to auscultation.  No retractions are noted.  No rales or rhonchi are present.  CARDIOVASCULAR: Normal S1, S2. No rubs, murmurs or gallops.  ABDOMEN: Bowel sounds normal. Not distended. Soft. No tenderness or masses.  No ascites is noted.  MUSCULOSKELETAL:  There is no clubbing, cyanosis, or edema of the extremities x4.  There is full range of motion of the lumbar spine.  There is full range of motion of the extremities x4.  There is no deformity noted.    NEUROLOGIC:       Normal speech development.      Hearing normal.      Normal gait.      Motor and sensory exams grossly normal.  PSYCHIATRIC: Patient is alert and oriented x3.  Thought processes are all normal.  There is no homicidality.  There is no suicidality.  There is no evidence of psychosis.    LABORATORY/RADIOLOGY:   Chart reviewed.      ASSESSMENT:   L4/5 discectomy next week  Left sciatica for several months    ruptured aneurysm 2021 d/t HTN  CRS   ED     PLAN:  He is medically cleared and optimized for general anesthesia and spinal surgery at minimal risk of perioperative complications.  viagra prn  Return to clinic in one year.

## 2024-09-24 NOTE — PROGRESS NOTES
Patient received 2 vaccines IM to the right deltoid, Hep A anterior, and HPV posterior.  Tolerated well and left in NAD

## 2024-09-27 ENCOUNTER — TELEPHONE (OUTPATIENT)
Dept: ORTHOPEDICS | Facility: CLINIC | Age: 34
End: 2024-09-27
Payer: COMMERCIAL

## 2024-09-27 ENCOUNTER — ANESTHESIA EVENT (OUTPATIENT)
Dept: SURGERY | Facility: HOSPITAL | Age: 34
End: 2024-09-27
Payer: COMMERCIAL

## 2024-09-27 NOTE — TELEPHONE ENCOUNTER
Spoke to pt, informed his arrival time for surgery Monday 9/30/204 is 5:00am at the [place] location, [address]. No food or drink after midnight on Sunday. Reminded pt to shower with Hibiclens antibacterial soap Sunday night and morning of surgery. Pt pleased and verbalized understanding.

## 2024-09-30 ENCOUNTER — ANESTHESIA (OUTPATIENT)
Dept: SURGERY | Facility: HOSPITAL | Age: 34
End: 2024-09-30
Payer: COMMERCIAL

## 2024-09-30 ENCOUNTER — HOSPITAL ENCOUNTER (OUTPATIENT)
Facility: HOSPITAL | Age: 34
Discharge: HOME | End: 2024-09-30
Attending: ORTHOPAEDIC SURGERY | Admitting: ORTHOPAEDIC SURGERY
Payer: COMMERCIAL

## 2024-09-30 ENCOUNTER — PATIENT MESSAGE (OUTPATIENT)
Dept: ORTHOPEDICS | Facility: CLINIC | Age: 34
End: 2024-09-30

## 2024-09-30 VITALS
BODY MASS INDEX: 37.14 KG/M2 | WEIGHT: 305 LBS | HEART RATE: 61 BPM | TEMPERATURE: 98 F | DIASTOLIC BLOOD PRESSURE: 63 MMHG | RESPIRATION RATE: 11 BRPM | SYSTOLIC BLOOD PRESSURE: 112 MMHG | HEIGHT: 76 IN | OXYGEN SATURATION: 90 %

## 2024-09-30 DIAGNOSIS — M51.369 DDD (DEGENERATIVE DISC DISEASE), LUMBAR: Primary | ICD-10-CM

## 2024-09-30 DIAGNOSIS — M51.26 LUMBAR DISC HERNIATION: Primary | ICD-10-CM

## 2024-09-30 LAB
ABO + RH BLD: NORMAL
BLD GP AB SCN CELLS X3 SERPL QL: NORMAL
SPECIMEN OUTDATE: NORMAL

## 2024-09-30 PROCEDURE — 99900035 HC TECH TIME PER 15 MIN (STAT)

## 2024-09-30 PROCEDURE — 36415 COLL VENOUS BLD VENIPUNCTURE: CPT | Performed by: ORTHOPAEDIC SURGERY

## 2024-09-30 PROCEDURE — 94761 N-INVAS EAR/PLS OXIMETRY MLT: CPT

## 2024-09-30 PROCEDURE — 36000711: Performed by: ORTHOPAEDIC SURGERY

## 2024-09-30 PROCEDURE — 37000009 HC ANESTHESIA EA ADD 15 MINS: Performed by: ORTHOPAEDIC SURGERY

## 2024-09-30 PROCEDURE — 25000003 PHARM REV CODE 250: Performed by: ORTHOPAEDIC SURGERY

## 2024-09-30 PROCEDURE — 36000710: Performed by: ORTHOPAEDIC SURGERY

## 2024-09-30 PROCEDURE — 63600175 PHARM REV CODE 636 W HCPCS: Performed by: ORTHOPAEDIC SURGERY

## 2024-09-30 PROCEDURE — 71000033 HC RECOVERY, INTIAL HOUR: Performed by: ORTHOPAEDIC SURGERY

## 2024-09-30 PROCEDURE — 86901 BLOOD TYPING SEROLOGIC RH(D): CPT | Performed by: ORTHOPAEDIC SURGERY

## 2024-09-30 PROCEDURE — 63600175 PHARM REV CODE 636 W HCPCS: Performed by: NURSE ANESTHETIST, CERTIFIED REGISTERED

## 2024-09-30 PROCEDURE — 25000003 PHARM REV CODE 250: Performed by: NURSE ANESTHETIST, CERTIFIED REGISTERED

## 2024-09-30 PROCEDURE — 71000015 HC POSTOP RECOV 1ST HR: Performed by: ORTHOPAEDIC SURGERY

## 2024-09-30 PROCEDURE — D9220A PRA ANESTHESIA: Mod: CRNA,,, | Performed by: NURSE ANESTHETIST, CERTIFIED REGISTERED

## 2024-09-30 PROCEDURE — 63030 LAMOT DCMPRN NRV RT 1 LMBR: CPT | Mod: LT,,, | Performed by: ORTHOPAEDIC SURGERY

## 2024-09-30 PROCEDURE — 63600175 PHARM REV CODE 636 W HCPCS: Performed by: STUDENT IN AN ORGANIZED HEALTH CARE EDUCATION/TRAINING PROGRAM

## 2024-09-30 PROCEDURE — 37000008 HC ANESTHESIA 1ST 15 MINUTES: Performed by: ORTHOPAEDIC SURGERY

## 2024-09-30 PROCEDURE — 27201423 OPTIME MED/SURG SUP & DEVICES STERILE SUPPLY: Performed by: ORTHOPAEDIC SURGERY

## 2024-09-30 PROCEDURE — 86900 BLOOD TYPING SEROLOGIC ABO: CPT | Performed by: ORTHOPAEDIC SURGERY

## 2024-09-30 PROCEDURE — 25000003 PHARM REV CODE 250: Performed by: STUDENT IN AN ORGANIZED HEALTH CARE EDUCATION/TRAINING PROGRAM

## 2024-09-30 PROCEDURE — D9220A PRA ANESTHESIA: Mod: ANES,,, | Performed by: STUDENT IN AN ORGANIZED HEALTH CARE EDUCATION/TRAINING PROGRAM

## 2024-09-30 PROCEDURE — 86850 RBC ANTIBODY SCREEN: CPT | Performed by: ORTHOPAEDIC SURGERY

## 2024-09-30 RX ORDER — MIDAZOLAM HYDROCHLORIDE 1 MG/ML
INJECTION INTRAMUSCULAR; INTRAVENOUS
Status: DISCONTINUED | OUTPATIENT
Start: 2024-09-30 | End: 2024-09-30

## 2024-09-30 RX ORDER — METHYLPREDNISOLONE ACETATE 40 MG/ML
INJECTION, SUSPENSION INTRA-ARTICULAR; INTRALESIONAL; INTRAMUSCULAR; SOFT TISSUE
Status: DISCONTINUED | OUTPATIENT
Start: 2024-09-30 | End: 2024-09-30 | Stop reason: HOSPADM

## 2024-09-30 RX ORDER — ACETAMINOPHEN 325 MG/1
650 TABLET ORAL EVERY 6 HOURS PRN
Status: DISCONTINUED | OUTPATIENT
Start: 2024-09-30 | End: 2024-09-30 | Stop reason: HOSPADM

## 2024-09-30 RX ORDER — LIDOCAINE HYDROCHLORIDE 20 MG/ML
INJECTION INTRAVENOUS
Status: DISCONTINUED | OUTPATIENT
Start: 2024-09-30 | End: 2024-09-30

## 2024-09-30 RX ORDER — ONDANSETRON HYDROCHLORIDE 2 MG/ML
INJECTION, SOLUTION INTRAMUSCULAR; INTRAVENOUS
Status: DISCONTINUED | OUTPATIENT
Start: 2024-09-30 | End: 2024-09-30

## 2024-09-30 RX ORDER — LIDOCAINE HYDROCHLORIDE AND EPINEPHRINE 10; 10 MG/ML; UG/ML
INJECTION, SOLUTION INFILTRATION; PERINEURAL
Status: DISCONTINUED | OUTPATIENT
Start: 2024-09-30 | End: 2024-09-30 | Stop reason: HOSPADM

## 2024-09-30 RX ORDER — HYDROMORPHONE HYDROCHLORIDE 1 MG/ML
0.2 INJECTION, SOLUTION INTRAMUSCULAR; INTRAVENOUS; SUBCUTANEOUS EVERY 5 MIN PRN
Status: COMPLETED | OUTPATIENT
Start: 2024-09-30 | End: 2024-09-30

## 2024-09-30 RX ORDER — HALOPERIDOL 5 MG/ML
0.5 INJECTION INTRAMUSCULAR EVERY 10 MIN PRN
Status: DISCONTINUED | OUTPATIENT
Start: 2024-09-30 | End: 2024-09-30 | Stop reason: HOSPADM

## 2024-09-30 RX ORDER — FAMOTIDINE 10 MG/ML
INJECTION INTRAVENOUS
Status: DISCONTINUED | OUTPATIENT
Start: 2024-09-30 | End: 2024-09-30

## 2024-09-30 RX ORDER — OXYCODONE HYDROCHLORIDE 5 MG/1
5 TABLET ORAL EVERY 6 HOURS PRN
Status: DISCONTINUED | OUTPATIENT
Start: 2024-09-30 | End: 2024-09-30 | Stop reason: HOSPADM

## 2024-09-30 RX ORDER — EPHEDRINE SULFATE 50 MG/ML
INJECTION, SOLUTION INTRAVENOUS
Status: DISCONTINUED | OUTPATIENT
Start: 2024-09-30 | End: 2024-09-30

## 2024-09-30 RX ORDER — OXYCODONE HYDROCHLORIDE 5 MG/1
10 TABLET ORAL EVERY 4 HOURS PRN
Status: DISCONTINUED | OUTPATIENT
Start: 2024-09-30 | End: 2024-09-30 | Stop reason: HOSPADM

## 2024-09-30 RX ORDER — OXYCODONE HYDROCHLORIDE 5 MG/1
5 TABLET ORAL
Status: DISCONTINUED | OUTPATIENT
Start: 2024-09-30 | End: 2024-09-30 | Stop reason: HOSPADM

## 2024-09-30 RX ORDER — GLUCAGON 1 MG
1 KIT INJECTION
Status: DISCONTINUED | OUTPATIENT
Start: 2024-09-30 | End: 2024-09-30 | Stop reason: HOSPADM

## 2024-09-30 RX ORDER — VANCOMYCIN HYDROCHLORIDE 1 G/20ML
INJECTION, POWDER, LYOPHILIZED, FOR SOLUTION INTRAVENOUS
Status: DISCONTINUED | OUTPATIENT
Start: 2024-09-30 | End: 2024-09-30 | Stop reason: HOSPADM

## 2024-09-30 RX ORDER — PREGABALIN 75 MG/1
150 CAPSULE ORAL ONCE
Status: COMPLETED | OUTPATIENT
Start: 2024-09-30 | End: 2024-09-30

## 2024-09-30 RX ORDER — PROPOFOL 10 MG/ML
VIAL (ML) INTRAVENOUS
Status: DISCONTINUED | OUTPATIENT
Start: 2024-09-30 | End: 2024-09-30

## 2024-09-30 RX ORDER — CELECOXIB 200 MG/1
400 CAPSULE ORAL ONCE
Status: COMPLETED | OUTPATIENT
Start: 2024-09-30 | End: 2024-09-30

## 2024-09-30 RX ORDER — ACETAMINOPHEN 500 MG
1000 TABLET ORAL ONCE
Status: COMPLETED | OUTPATIENT
Start: 2024-09-30 | End: 2024-09-30

## 2024-09-30 RX ORDER — DEXAMETHASONE SODIUM PHOSPHATE 4 MG/ML
INJECTION, SOLUTION INTRA-ARTICULAR; INTRALESIONAL; INTRAMUSCULAR; INTRAVENOUS; SOFT TISSUE
Status: DISCONTINUED | OUTPATIENT
Start: 2024-09-30 | End: 2024-09-30

## 2024-09-30 RX ORDER — METHOCARBAMOL 500 MG/1
1000 TABLET, FILM COATED ORAL 3 TIMES DAILY PRN
Status: DISCONTINUED | OUTPATIENT
Start: 2024-09-30 | End: 2024-09-30 | Stop reason: HOSPADM

## 2024-09-30 RX ORDER — NEOSTIGMINE METHYLSULFATE 0.5 MG/ML
INJECTION INTRAVENOUS
Status: DISCONTINUED | OUTPATIENT
Start: 2024-09-30 | End: 2024-09-30

## 2024-09-30 RX ORDER — SODIUM CHLORIDE 0.9 % (FLUSH) 0.9 %
10 SYRINGE (ML) INJECTION
Status: DISCONTINUED | OUTPATIENT
Start: 2024-09-30 | End: 2024-09-30 | Stop reason: HOSPADM

## 2024-09-30 RX ORDER — PHENYLEPHRINE HYDROCHLORIDE 10 MG/ML
INJECTION INTRAVENOUS
Status: DISCONTINUED | OUTPATIENT
Start: 2024-09-30 | End: 2024-09-30

## 2024-09-30 RX ORDER — METHOCARBAMOL 500 MG/1
1000 TABLET, FILM COATED ORAL ONCE
Status: COMPLETED | OUTPATIENT
Start: 2024-09-30 | End: 2024-09-30

## 2024-09-30 RX ORDER — FENTANYL CITRATE 50 UG/ML
INJECTION, SOLUTION INTRAMUSCULAR; INTRAVENOUS
Status: DISCONTINUED | OUTPATIENT
Start: 2024-09-30 | End: 2024-09-30

## 2024-09-30 RX ORDER — ROCURONIUM BROMIDE 10 MG/ML
INJECTION, SOLUTION INTRAVENOUS
Status: DISCONTINUED | OUTPATIENT
Start: 2024-09-30 | End: 2024-09-30

## 2024-09-30 RX ORDER — GABAPENTIN 300 MG/1
300 CAPSULE ORAL 3 TIMES DAILY
Status: DISCONTINUED | OUTPATIENT
Start: 2024-09-30 | End: 2024-09-30 | Stop reason: HOSPADM

## 2024-09-30 RX ORDER — KETAMINE HCL IN 0.9 % NACL 50 MG/5 ML
SYRINGE (ML) INTRAVENOUS
Status: DISCONTINUED | OUTPATIENT
Start: 2024-09-30 | End: 2024-09-30

## 2024-09-30 RX ADMIN — ACETAMINOPHEN 1000 MG: 500 TABLET ORAL at 06:09

## 2024-09-30 RX ADMIN — ROCURONIUM BROMIDE 50 MG: 10 INJECTION, SOLUTION INTRAVENOUS at 07:09

## 2024-09-30 RX ADMIN — EPHEDRINE SULFATE 10 MG: 50 INJECTION INTRAVENOUS at 08:09

## 2024-09-30 RX ADMIN — PHENYLEPHRINE HYDROCHLORIDE 100 MCG: 10 INJECTION INTRAVENOUS at 08:09

## 2024-09-30 RX ADMIN — DEXAMETHASONE SODIUM PHOSPHATE 8 MG: 4 INJECTION, SOLUTION INTRAMUSCULAR; INTRAVENOUS at 07:09

## 2024-09-30 RX ADMIN — GLYCOPYRROLATE 0.4 MG: 0.2 INJECTION, SOLUTION INTRAMUSCULAR; INTRAVENOUS at 09:09

## 2024-09-30 RX ADMIN — PROPOFOL 200 MG: 10 INJECTION, EMULSION INTRAVENOUS at 07:09

## 2024-09-30 RX ADMIN — HYDROMORPHONE HYDROCHLORIDE 0.2 MG: 1 INJECTION, SOLUTION INTRAMUSCULAR; INTRAVENOUS; SUBCUTANEOUS at 10:09

## 2024-09-30 RX ADMIN — EPHEDRINE SULFATE 5 MG: 50 INJECTION INTRAVENOUS at 09:09

## 2024-09-30 RX ADMIN — Medication 30 MG: at 07:09

## 2024-09-30 RX ADMIN — SODIUM CHLORIDE, SODIUM GLUCONATE, SODIUM ACETATE, POTASSIUM CHLORIDE, MAGNESIUM CHLORIDE, SODIUM PHOSPHATE, DIBASIC, AND POTASSIUM PHOSPHATE: .53; .5; .37; .037; .03; .012; .00082 INJECTION, SOLUTION INTRAVENOUS at 08:09

## 2024-09-30 RX ADMIN — EPHEDRINE SULFATE 5 MG: 50 INJECTION INTRAVENOUS at 07:09

## 2024-09-30 RX ADMIN — FENTANYL CITRATE 100 MCG: 0.05 INJECTION, SOLUTION INTRAMUSCULAR; INTRAVENOUS at 07:09

## 2024-09-30 RX ADMIN — PHENYLEPHRINE HYDROCHLORIDE 200 MCG: 10 INJECTION INTRAVENOUS at 07:09

## 2024-09-30 RX ADMIN — FAMOTIDINE 20 MG: 10 INJECTION, SOLUTION INTRAVENOUS at 07:09

## 2024-09-30 RX ADMIN — LIDOCAINE HYDROCHLORIDE 100 MG: 20 INJECTION INTRAVENOUS at 07:09

## 2024-09-30 RX ADMIN — ONDANSETRON 4 MG: 2 INJECTION INTRAMUSCULAR; INTRAVENOUS at 09:09

## 2024-09-30 RX ADMIN — METHOCARBAMOL 1000 MG: 100 INJECTION, SOLUTION INTRAMUSCULAR; INTRAVENOUS at 09:09

## 2024-09-30 RX ADMIN — METHOCARBAMOL 1000 MG: 500 TABLET ORAL at 06:09

## 2024-09-30 RX ADMIN — MIDAZOLAM HYDROCHLORIDE 2 MG: 1 INJECTION, SOLUTION INTRAMUSCULAR; INTRAVENOUS at 06:09

## 2024-09-30 RX ADMIN — OXYCODONE HYDROCHLORIDE 10 MG: 5 TABLET ORAL at 10:09

## 2024-09-30 RX ADMIN — PHENYLEPHRINE HYDROCHLORIDE 100 MCG: 10 INJECTION INTRAVENOUS at 07:09

## 2024-09-30 RX ADMIN — PREGABALIN 150 MG: 75 CAPSULE ORAL at 06:09

## 2024-09-30 RX ADMIN — EPHEDRINE SULFATE 15 MG: 50 INJECTION INTRAVENOUS at 07:09

## 2024-09-30 RX ADMIN — CEFAZOLIN 3 G: 2 INJECTION, POWDER, FOR SOLUTION INTRAMUSCULAR; INTRAVENOUS at 07:09

## 2024-09-30 RX ADMIN — NEOSTIGMINE METHYLSULFATE 4 MG: 0.5 INJECTION INTRAVENOUS at 09:09

## 2024-09-30 RX ADMIN — CELECOXIB 400 MG: 200 CAPSULE ORAL at 06:09

## 2024-09-30 RX ADMIN — GABAPENTIN 300 MG: 300 CAPSULE ORAL at 10:09

## 2024-09-30 RX ADMIN — SODIUM CHLORIDE: 0.9 INJECTION, SOLUTION INTRAVENOUS at 06:09

## 2024-09-30 NOTE — PLAN OF CARE
Pre op complete. Waiting on anesthesia orders, anesth, surgery and blood consent, site gen and update. Pt's fiance at bedside; he will hold pt belongings during procedure. Pt resting comfortably with all questions addressed at this time and call light in reach.

## 2024-09-30 NOTE — BRIEF OP NOTE
[place] - Surgery ([facility])  Brief Operative Note    Surgery Date: 9/30/2024     Surgeons and Role:     * Raleigh Bond MD - Primary    Assisting Surgeon: None    Pre-op Diagnosis:  Lumbar radiculopathy, chronic [M54.16]    Post-op Diagnosis:  Post-Op Diagnosis Codes:     * Lumbar radiculopathy, chronic [M54.16]    Procedure(s) (LRB):  LAMINECTOMY, SPINE, LUMBAR, WITH DISCECTOMY L4-5 (L) TREVOR RETRACTORS SNS: SSEP/EMG (N/A)    Anesthesia: General    Operative Findings: See full op note    Estimated Blood Loss: 25 mL         Specimens:   Specimen (24h ago, onward)      None              Discharge Note    OUTCOME: Patient tolerated treatment/procedure well without complication and is now ready for discharge.    DISPOSITION: Home or Self Care    FINAL DIAGNOSIS:  Lumbar disc herniation    FOLLOWUP: In clinic    DISCHARGE INSTRUCTIONS:    Discharge Procedure Orders   Notify your health care provider if you experience any of the following:  temperature >100.4     Notify your health care provider if you experience any of the following:  persistent nausea and vomiting or diarrhea     Notify your health care provider if you experience any of the following:  severe uncontrolled pain     Notify your health care provider if you experience any of the following:  redness, tenderness, or signs of infection (pain, swelling, redness, odor or green/yellow discharge around incision site)     Notify your health care provider if you experience any of the following:  difficulty breathing or increased cough     Notify your health care provider if you experience any of the following:  severe persistent headache     Notify your health care provider if you experience any of the following:  worsening rash     Notify your health care provider if you experience any of the following:  persistent dizziness, light-headedness, or visual disturbances     Notify your health care provider if you experience any of the following:  increased  confusion or weakness     Weight bearing restrictions (specify):   Order Comments: WBAT with spine precautions

## 2024-09-30 NOTE — ANESTHESIA PREPROCEDURE EVALUATION
[facility]  Anesthesia Pre-Operative Evaluation         Patient Name/: Scott Gallagher, 1990  MRN: [id number]    SUBJECTIVE:     Pre-operative evaluation for Procedure(s) (LRB):  LAMINECTOMY, SPINE, LUMBAR, WITH DISCECTOMY L4-5 (L) TREVOR RETRACTORS SNS: SSEP/EMG (N/A)     2024    Scott Gallagher is a 34 y.o. male w/ a significant PMHx of HTN, migraines, anxiety and h/o hemorrhagic CVA who presents due to Lumbar sciatica.     Patient now presents for the above procedure(s).    ________________________________________  No results found for this or any previous visit.    ________________________________________    LDA:        Peripheral IV - Single Lumen 24 20 G No Left;Posterior Hand (Active)   Site Assessment Clean;Dry;Intact;No redness;No swelling;No drainage;No warmth 24   Extremity Assessment Distal to IV No abnormal discoloration;No redness;No swelling;No warmth 24   Line Status Flushed 24   Dressing Status Clean;Dry;Intact 24   Dressing Intervention Integrity maintained 24   Reason Not Rotated Not due 24   Number of days: 0       Drips:       Patient Active Problem List   Diagnosis    Pelvic floor dysfunction    Dissection of left carotid artery    Anxiety    Basal ganglia hemorrhage    Migraine 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       Current Inpatient Medications:    ceFAZolin (Ancef) IV (PEDS and ADULTS)  2 g Intravenous Once       No current facility-administered medications on file prior to encounter.     Current Outpatient Medications on File Prior to Encounter   Medication Sig Dispense Refill    ALPRAZolam (XANAX) 0.5 MG tablet Take 1  tablet (0.5 mg total) by mouth daily as needed for Anxiety. 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 180 capsule 0    olmesartan-amLODIPin-hcthiazid 40-10-25 mg Tab Take 1 tablet by mouth once daily. 90 tablet 2    propranoloL (INDERAL) 10 MG tablet Take 10 mg by mouth 3 (three) times daily as needed.      aspirin 81 MG Chew Take 81 mg by mouth once daily.      topiramate (TOPAMAX) 50 MG tablet Take 1 tablet (50 mg total) by mouth once daily. (Patient not taking: Reported on 9/24/2024) 90 tablet 1       Past Surgical History:   Procedure Laterality Date    APPENDECTOMY  2004    COLONOSCOPY N/A 9/5/2024    Procedure: COLONOSCOPY;  Surgeon: Rahul Valentine MD;  Location: [facility] ([address] [place] [address]);  Service: Endoscopy;  Laterality: N/A;    EPIDURAL STEROID INJECTION N/A 7/21/2023    Procedure: L4-5 LESI (toward the left);  Surgeon: Dom Jha DO;  Location: [facility];  Service: Pain Management;  Laterality: N/A;  oral sedation    EPIDURAL STEROID INJECTION INTO LUMBAR SPINE N/A 4/23/2024    Procedure: L4-5 Lumbar SUSANNA (toward the left - long needle);  Surgeon: Dom Jha DO;  Location: [facility];  Service: Pain Management;  Laterality: N/A;  15mins-Oral Sed    ESOPHAGOGASTRODUODENOSCOPY N/A 9/5/2024    Procedure: EGD (ESOPHAGOGASTRODUODENOSCOPY);  Surgeon: Rahul Valentine MD;  [facility]  Service: Endoscopy;  Laterality: N/A;  Referred by: Dr. 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, golytely, instructions to portal-Kpvt  8/28 pre call attempted, no answer-LVM  9/4-pt cannot come in earlier than 12:15pm, precall complete-Kpvt    WISDOM TOOTH EXTRACTION  2011       Social History:  Tobacco Use: Low Risk  (9/30/2024)    Patient History     Smoking Tobacco Use: Never     Smokeless Tobacco Use: Never     Passive  Exposure: Not on file       Alcohol Use: Not At Risk (3/26/2024)    AUDIT-C     Frequency of Alcohol Consumption: 2-3 times a week     Average Number of Drinks: 1 or 2     Frequency of Binge Drinking: Never       OBJECTIVE:     Vital Signs Range:  BMI Readings from Last 1 Encounters:   09/30/24 37.13 kg/m²       Temp:  [37.1 °C (98.8 °F)]   Pulse:  [77]   Resp:  [16]   BP: (123)/(67)   SpO2:  [97 %]        Significant Labs:        Component Value Date/Time    WBC 7.29 09/18/2024 1435    HGB 16.2 09/18/2024 1435    HCT 44.9 09/18/2024 1435     09/18/2024 1435     09/18/2024 1435    K 3.3 (L) 09/18/2024 1435     09/18/2024 1435    CO2 22 (L) 09/18/2024 1435    GLU 90 09/18/2024 1435    BUN 10 09/18/2024 1435    CREATININE 0.9 09/18/2024 1435    CALCIUM 9.7 09/18/2024 1435    ALBUMIN 4.0 04/26/2024 1025    PROT 7.0 04/26/2024 1025    ALKPHOS 73 04/26/2024 1025    BILITOT 0.4 04/26/2024 1025    AST 18 04/26/2024 1025    ALT 43 04/26/2024 1025    INR 0.9 09/18/2024 1435    HGBA1C 5.5 02/17/2023 0945        Please see Results Review for additional labs.     Diagnostic Studies: No relevant studies.    EKG:   Results for orders placed or performed during the hospital encounter of 09/18/24   EKG 12-lead    Collection Time: 09/18/24  2:55 PM   Result Value Ref Range    QRS Duration 86 ms    [facility] QTC Calculation 415 ms    Narrative    Test Reason : Z01.818,    Vent. Rate : 090 BPM     Atrial Rate : 090 BPM     P-R Int : 152 ms          QRS Dur : 086 ms      QT Int : 340 ms       P-R-T Axes : 008 028 048 degrees     QTc Int : 415 ms    Normal sinus rhythm  Normal ECG  No previous ECGs available  Confirmed by SANTOSH ASHTON MD ([id number]) on 9/18/2024 3:07:51 PM    Referred By: LOAN ESPINOZA           Confirmed By:SANTOSH ASHTON MD       ECHO:  See subjective, if available.      ASSESSMENT/PLAN:                                                                                                                   09/30/2024  Scott Gallagher is a 34 y.o., male.      Pre-op Assessment    I have reviewed the Patient Summary Reports.     I have reviewed the Nursing Notes.    I have reviewed the Medications.     Review of Systems  Anesthesia Hx:  No problems with previous Anesthesia   History of prior surgery of interest to airway management or planning:          Denies Family Hx of Anesthesia complications.    Denies Personal Hx of Anesthesia complications.                    Hematology/Oncology:    Oncology Normal                                   Cardiovascular:     Hypertension       Denies Angina.        ECG has been reviewed.                          Pulmonary:       Denies Shortness of breath.  Denies Recent URI.                 Renal/:  Renal/ Normal                 Hepatic/GI:      Denies GERD. Denies Liver Disease.            Musculoskeletal:         Spine Disorders: lumbar            Neurological:   CVA   Headaches Denies Seizures.                                Endocrine:  Endocrine Normal Denies Diabetes.               Physical Exam  General: Well nourished, Cooperative and Alert    Airway:  Mallampati: I   Mouth Opening: Normal  TM Distance: Normal  Tongue: Normal  Neck ROM: Normal ROM    Dental:  Intact        Anesthesia Plan  Type of Anesthesia, risks & benefits discussed:    Anesthesia Type: Gen ETT  Intra-op Monitoring Plan: Standard ASA Monitors  Post Op Pain Control Plan: multimodal analgesia and IV/PO Opioids PRN  Induction:  IV  Airway Plan: Direct and Video, Post-Induction  Informed Consent: Informed consent signed with the Patient and all parties understand the risks and agree with anesthesia plan.  All questions answered.   ASA Score: 3  Day of Surgery Review of History & Physical: H&P Update referred to the surgeon/provider.  Anesthesia Plan Notes: Hemorrhagic stroke in 2021 likely 2/2 HTN. No residual deficits. Took home BP meds today.    Ready For Surgery From Anesthesia Perspective.     .

## 2024-09-30 NOTE — OP NOTE
DATE OF PROCEDURE: 9/30/2024.     SURGEON: Raleigh Bond M.D.     FIRST ASSISTANT: Christ Chopra MD, PGY-4 Orthopedics     PREOPERATIVE DIAGNOSIS: L4-5 Lumbar Disc Herniation     POSTOPERATIVE DIAGNOSIS: Same     PROCEDURES PERFORMED:  1. Lumbar laminotomy, foraminotomy and disectomy L4-5     ANESTHESIA: General endotracheal anesthesia.     ESTIMATED BLOOD LOSS: 25 mL.     SPECIMENS: None.     FINDINGS: L paracentral L4-5 HNP.     DRAINS: None.     COMPLICATIONS: None.     SPONGE AND NEEDLE COUNT: Correct x2.     NEUROLOGICAL MONITORING: Somatosensory evoked  potential, free-running EMG.  There were no changes to SSEP baselines.  There no significant EMG activity.     REASON FOR OPERATION AND BRIEF HISTORY AND PHYSICAL: Scott Gallagher is a 34 y.o. male with refractory lumbar L4-5 disc herniation and LLE radiculopathy. The patient has failed conservative management and is here for surgery today.     DESCRIPTION OF INFORMED CONSENT: Please see my last clinic note for a full description of the informed consent I had with the patient.     DESCRIPTION OF PROCEDURE: The patient was met in the preoperative area where her low back was marked in the midline by me personally. Subsequently, he was brought to the Operating Room where sequential compression devices were placed on the patient's bilateral calves and run throughout the case. The patient was then intubated via general endotracheal anesthesia. They were then flipped prone onto a Moises table with Jesus frame. The head was secured on a facial pillow. The eyes were personally checked by macd found to be acceptable. The arms were held in a 90-90 position. All bony prominences were padded paying special attention to the axilla, elbows, hands, knees, and feet. Being satisfied with positioning and confirming this to be adequate with Anesthesia, the patient's lower back was prepped and draped in normal sterile fashion.     A full timeout was then called identifying the  patient, the procedural site and level, the availability of all instruments and no specific nursing, surgical, anesthetic or neurological monitoring concerns. Finding that it was safe to proceed with surgery, the patient was given a weight-appropriate dose of antibiotics by the Anesthesia Service.     I made a midline lumbar incision and carried dissection down through the skin and subcutaneous tissues using combination of sharp dissection and electrocautery where necessary. The dorsal fascia of the lumbar spine was identified and incised in the midline and I performed a preliminary subperiosteal exposure of what I took to be the L4 lamina as well as what I took to be the L4-5 facet joint. At the conclusion of my preliminary dissection, I placed a Kocher clamp on the L4 spinous process and a Penfield 4 elevator under what I took to be the trailing edge of the L4 lamina, took lateral radiograph and thus confirmed my level.     At this point, I finalized my exposure. I then used a high-speed drill to thin the trailing one-third of the bilateral lamina to reveal the origin of the ligamentum flavum. I then released the ligamentum flavum from the trailing edge of the bilateral lamina using a forward-angle curette. I gently worked the ligamentum flavum distally. This allowed me to visualize the underlying epidural fat and subsequently blue-white dura. I used the Penfield 4 elevator to move the thecal sac towards the midline and brought in a nerve root retractor. An obvious disk bulge was then seen on the left side and I entered it with a disc knife via a vertical annulotomy. Multiple soft fragments were removed with a pituitary. At the conclusion of my diskectomy, I could easily pass a Sabine elevator along the L5 nerve root ventrally as well as across the thecal sac. The wound was then thoroughly irrigated, including irrigation of the disk space, which revealed no residual free fragments. I then finalized hemostasis  with FloSeal and patties. Valsalva maneuver to 40 mmHg demonstrated no cerebrospinal fluid leak. I put 1mL of DepoMedrol on top of the dura and nerve. Next, 500 mg of vancomycin powder was placed in the deep space and deep fascia was closed with #1 Vicryl in a figure-of-eight fashion. The superficial layer was then irrigated and closed with 2-0 Vicryl, 3-0 Stratafix, Dermabond and Steri-Strips. A soft dressing was placed. The patient was then flipped supine, extubated and transferred to the Recovery Room in stable condition.    Justification of -22 Modifier: This was a more complex case that usual given the patient's morbid obesity and deep tissue. This made all aspects of the surgery more difficult, from exposure to discectomy and instrumentation.    Raleigh Bond MD  Orthopaedic Spine Surgeon  Department of Orthopaedic Surgery  [phone]

## 2024-09-30 NOTE — BRIEF OP NOTE
[place] - Surgery ([facility])  Brief Operative Note    Surgery Date: 9/30/2024     Surgeons and Role:     * Raleigh Bond MD - Primary    Assisting Surgeon: None    Pre-op Diagnosis:  Lumbar radiculopathy, chronic [M54.16]    Post-op Diagnosis:  Post-Op Diagnosis Codes:     * Lumbar radiculopathy, chronic [M54.16]    Procedure(s) (LRB):  LAMINECTOMY, SPINE, LUMBAR, WITH DISCECTOMY L4-5 (L) TREVOR RETRACTORS SNS: SSEP/EMG (N/A)    Anesthesia: General        Estimated Blood Loss: * No values recorded between 9/30/2024  7:35 AM and 9/30/2024  8:16 AM *         Specimens:   Specimen (24h ago, onward)      None              Discharge Note    OUTCOME: Patient tolerated treatment/procedure well without complication and is now ready for discharge.    DISPOSITION: Home or Self Care    FINAL DIAGNOSIS:  <principal problem not specified>    FOLLOWUP: In clinic    DISCHARGE INSTRUCTIONS:  No discharge procedures on file.

## 2024-09-30 NOTE — ANESTHESIA PROCEDURE NOTES
Intubation    Date/Time: 9/30/2024 7:09 AM    Performed by: Aida Myers CRNA  Authorized by: Martina Quintanilla MD    Intubation:     Induction:  Intravenous    Intubated:  Postinduction    Mask Ventilation:  Easy with oral airway    Attempts:  1    Attempted By:  CRNA    Method of Intubation:  Video laryngoscopy    Blade:  Rajan 3    Laryngeal View Grade: Grade I - full view of cords      Difficult Airway Encountered?: No      Complications:  None    Airway Device:  Oral endotracheal tube    Airway Device Size:  7.5    Style/Cuff Inflation:  Cuffed (inflated to minimal occlusive pressure)    Inflation Amount (mL):  5    Tube secured:  23    Secured at:  The lips    Placement Verified By:  Capnometry    Complicating Factors:  None    Findings Post-Intubation:  BS equal bilateral and atraumatic/condition of teeth unchanged

## 2024-09-30 NOTE — TRANSFER OF CARE
"Anesthesia Transfer of Care Note    Patient: Scott Gallagher    Procedure(s) Performed: Procedure(s) (LRB):  LAMINECTOMY, SPINE, LUMBAR, WITH DISCECTOMY L4-5 (L) TREVOR RETRACTORS SNS: SSEP/EMG (N/A)    Patient location: PACU    Anesthesia Type: general    Transport from OR: Transported from OR on 6-10 L/min O2 by face mask with adequate spontaneous ventilation    Post pain: adequate analgesia    Post assessment: no apparent anesthetic complications    Post vital signs: stable    Level of consciousness: awake    Nausea/Vomiting: no nausea/vomiting    Complications: none    Transfer of care protocol was followed      Last vitals: Visit Vitals  /67 (BP Location: Right arm, Patient Position: Lying)   Pulse 62   Temp 37.1 °C (98.8 °F) (Temporal)   Resp 16   Ht 6' 4" (1.93 m)   Wt (!) 138.3 kg (305 lb)   SpO2 97%   BMI 37.13 kg/m²     "
not examined

## 2024-09-30 NOTE — DISCHARGE INSTRUCTIONS
DR. IOANA SWANSON'S POSTOPERATIVE INSTRUCTIONS -   LUMBAR DISKECTOMY       Antibiotics: You do not need additional antibiotics at home.    Wound Care: You may remove your dressing and shower 5 days after surgery. Until then please keep your wound clean and dry. Sponge baths are acceptable. Do not go in a pool or hot tub until seen in clinic. Please leave the small steri-strips covering your wound in place until they fall off naturally (2 weeks). You may notice clear suture ends hanging from the sides of your incision after the steri-strips are removed, it is ok to clip these with scissors.    Brace: You do not need a brace.    Pain: We will use a multimodal approach for pain management after your surgery.  You will be given a prescription for pain medicine when you are discharged from the hospital.  You will also be given prescriptions for Robaxin (a muscle relaxer), Gabapentin, Celebrex and Tylenol.  Please note: you will only be given ONE prescription for narcotics when you are discharged from the hospital.  This medication is for breakthrough pain only. This medication will not be refilled.  The other medications given to you may be refilled if needed.      Infection: Signs of infection include increasing wound drainage and redness around the wound, as well as a temperature over 101.5 degrees. It is unnecessary to take your temperature on a routine basis. Please call the above number if you are concerned about an infection.    Driving and Work: It is ok to return to driving and work as long as you are not taking narcotic pain medications. Please do not lift over 10 pounds or participate in exercise or sports until cleared by Dr. Swanson.    Deep Venous Thrombosis (Blood Clots): Symptoms include swelling in the legs and shortness of breath. Please call the office or proceed to the nearest emergency room if you have any of these symptoms.    Physical Therapy: The best physical therapy after surgery is walking. Please try  to walk as much as possible.    Follow-up: You will be scheduled for a follow-up appointment in 4 weeks with either Dr. Bond or his physician assistant, Lupe Rodriguez PA-C.    Questions: During business hours please call [phone] for routine questions. For after hours questions please call [phone] and ask to speak with the Orthopaedic resident on call.    Disability: If you submitted short term disability paperwork for us to complete and would like to check the status, please call the Disability Department at [phone].  You may fax any necessary paperwork to [phone].

## 2024-09-30 NOTE — PLAN OF CARE
VSS.  Patient tolerating oral liquids without difficulty.   No apparent s&s of distress noted at this time, no complaints voiced at this time.   Discharge instructions reviewed with patient and significant other with good verbal feedback received.   Post op medications delivered to bedside.  Patient ready for discharge.

## 2024-09-30 NOTE — ANESTHESIA POSTPROCEDURE EVALUATION
Anesthesia Post Evaluation    Patient: Scott Gallagher    Procedure(s) Performed: Procedure(s) (LRB):  LAMINECTOMY, SPINE, LUMBAR, WITH DISCECTOMY L4-5 (L) TREVOR RETRACTORS SNS: SSEP/EMG (N/A)    Final Anesthesia Type: general      Patient location during evaluation: PACU  Patient participation: Yes- Able to Participate  Level of consciousness: awake and alert  Post-procedure vital signs: reviewed and stable  Pain management: adequate  Airway patency: patent  BARBARA mitigation strategies: Multimodal analgesia  PONV status at discharge: No PONV  Anesthetic complications: no      Cardiovascular status: blood pressure returned to baseline, hemodynamically stable and stable  Respiratory status: unassisted, room air and spontaneous ventilation  Hydration status: euvolemic  Follow-up not needed.              Vitals Value Taken Time   /55 09/30/24 1002   Temp 36.6 °C (97.9 °F) 09/30/24 0943   Pulse 64 09/30/24 1014   Resp 21 09/30/24 1014   SpO2 92 % 09/30/24 1014   Vitals shown include unfiled device data.      No case tracking events are documented in the log.      Pain/Jasmeet Score: Pain Rating Prior to Med Admin: 10 (9/30/2024 10:10 AM)  Jasmeet Score: 10 (9/30/2024 10:02 AM)

## 2024-10-29 ENCOUNTER — OFFICE VISIT (OUTPATIENT)
Dept: ORTHOPEDICS | Facility: CLINIC | Age: 34
End: 2024-10-29
Payer: COMMERCIAL

## 2024-10-29 VITALS — BODY MASS INDEX: 37.13 KG/M2 | HEIGHT: 76 IN | WEIGHT: 304.88 LBS

## 2024-10-29 DIAGNOSIS — Z98.890 S/P LUMBAR DISCECTOMY: Primary | ICD-10-CM

## 2024-10-29 PROCEDURE — 99999 PR PBB SHADOW E&M-EST. PATIENT-LVL III: CPT | Mod: PBBFAC,,, | Performed by: ORTHOPAEDIC SURGERY

## 2024-10-29 RX ORDER — METHYLPREDNISOLONE 4 MG/1
TABLET ORAL
Qty: 1 EACH | Refills: 0 | Status: SHIPPED | OUTPATIENT
Start: 2024-10-29 | End: 2024-11-19

## 2024-11-13 ENCOUNTER — PATIENT MESSAGE (OUTPATIENT)
Dept: PSYCHIATRY | Facility: CLINIC | Age: 34
End: 2024-11-13
Payer: COMMERCIAL

## 2024-11-13 DIAGNOSIS — F41.1 GENERALIZED ANXIETY DISORDER WITH PANIC ATTACKS: ICD-10-CM

## 2024-11-13 DIAGNOSIS — F41.0 GENERALIZED ANXIETY DISORDER WITH PANIC ATTACKS: ICD-10-CM

## 2024-11-13 RX ORDER — ALPRAZOLAM 0.5 MG/1
0.5 TABLET ORAL DAILY PRN
Qty: 10 TABLET | Refills: 0 | Status: SHIPPED | OUTPATIENT
Start: 2024-11-13

## 2024-11-15 ENCOUNTER — TELEPHONE (OUTPATIENT)
Dept: ORTHOPEDICS | Facility: CLINIC | Age: 34
End: 2024-11-15
Payer: COMMERCIAL

## 2024-11-19 ENCOUNTER — OFFICE VISIT (OUTPATIENT)
Dept: ORTHOPEDICS | Facility: CLINIC | Age: 34
End: 2024-11-19
Payer: COMMERCIAL

## 2024-11-19 DIAGNOSIS — Z98.890 S/P LUMBAR DISCECTOMY: Primary | ICD-10-CM

## 2024-11-29 ENCOUNTER — PATIENT MESSAGE (OUTPATIENT)
Dept: INFECTIOUS DISEASES | Facility: CLINIC | Age: 34
End: 2024-11-29
Payer: COMMERCIAL

## 2024-12-02 ENCOUNTER — CLINICAL SUPPORT (OUTPATIENT)
Dept: INFECTIOUS DISEASES | Facility: CLINIC | Age: 34
End: 2024-12-02
Payer: COMMERCIAL

## 2024-12-02 ENCOUNTER — LAB VISIT (OUTPATIENT)
Dept: LAB | Facility: HOSPITAL | Age: 34
End: 2024-12-02
Attending: STUDENT IN AN ORGANIZED HEALTH CARE EDUCATION/TRAINING PROGRAM
Payer: COMMERCIAL

## 2024-12-02 DIAGNOSIS — Z29.81 ENCOUNTER FOR HIV PRE-EXPOSURE PROPHYLAXIS: ICD-10-CM

## 2024-12-02 DIAGNOSIS — Z23 NEED FOR HPV VACCINE: Primary | ICD-10-CM

## 2024-12-02 DIAGNOSIS — Z79.899 ENCOUNTER FOR LONG-TERM (CURRENT) USE OF HIGH-RISK MEDICATION: ICD-10-CM

## 2024-12-02 PROCEDURE — 87491 CHLMYD TRACH DNA AMP PROBE: CPT | Mod: 59 | Performed by: STUDENT IN AN ORGANIZED HEALTH CARE EDUCATION/TRAINING PROGRAM

## 2024-12-02 PROCEDURE — 90471 IMMUNIZATION ADMIN: CPT | Mod: S$GLB,,, | Performed by: STUDENT IN AN ORGANIZED HEALTH CARE EDUCATION/TRAINING PROGRAM

## 2024-12-02 PROCEDURE — 99999 PR PBB SHADOW E&M-EST. PATIENT-LVL I: CPT | Mod: PBBFAC,,,

## 2024-12-02 PROCEDURE — 90651 9VHPV VACCINE 2/3 DOSE IM: CPT | Mod: S$GLB,,, | Performed by: STUDENT IN AN ORGANIZED HEALTH CARE EDUCATION/TRAINING PROGRAM

## 2024-12-03 DIAGNOSIS — Z29.81 ENCOUNTER FOR HIV PRE-EXPOSURE PROPHYLAXIS: ICD-10-CM

## 2024-12-03 RX ORDER — EMTRICITABINE AND TENOFOVIR DISOPROXIL FUMARATE 200; 300 MG/1; MG/1
1 TABLET, FILM COATED ORAL DAILY
Qty: 30 TABLET | Refills: 2 | Status: SHIPPED | OUTPATIENT
Start: 2024-12-03 | End: 2025-03-03

## 2024-12-05 LAB
C TRACH DNA SPEC QL NAA+PROBE: NOT DETECTED
N GONORRHOEA DNA SPEC QL NAA+PROBE: NOT DETECTED

## 2024-12-06 ENCOUNTER — TELEPHONE (OUTPATIENT)
Dept: ORTHOPEDICS | Facility: CLINIC | Age: 34
End: 2024-12-06
Payer: COMMERCIAL

## 2024-12-08 ENCOUNTER — PATIENT MESSAGE (OUTPATIENT)
Dept: ORTHOPEDICS | Facility: CLINIC | Age: 34
End: 2024-12-08
Payer: COMMERCIAL

## 2024-12-09 RX ORDER — METHYLPREDNISOLONE 4 MG/1
TABLET ORAL
Qty: 1 EACH | Refills: 0 | Status: SHIPPED | OUTPATIENT
Start: 2024-12-09 | End: 2024-12-30

## 2024-12-18 ENCOUNTER — OFFICE VISIT (OUTPATIENT)
Dept: PSYCHIATRY | Facility: CLINIC | Age: 34
End: 2024-12-18
Payer: COMMERCIAL

## 2024-12-18 ENCOUNTER — PATIENT MESSAGE (OUTPATIENT)
Dept: PSYCHIATRY | Facility: CLINIC | Age: 34
End: 2024-12-18
Payer: COMMERCIAL

## 2024-12-18 DIAGNOSIS — F41.1 GENERALIZED ANXIETY DISORDER WITH PANIC ATTACKS: Primary | ICD-10-CM

## 2024-12-18 DIAGNOSIS — F41.0 GENERALIZED ANXIETY DISORDER WITH PANIC ATTACKS: Primary | ICD-10-CM

## 2024-12-18 PROCEDURE — 99214 OFFICE O/P EST MOD 30 MIN: CPT | Mod: 95,,,

## 2024-12-18 PROCEDURE — 4010F ACE/ARB THERAPY RXD/TAKEN: CPT | Mod: CPTII,95,,

## 2024-12-18 RX ORDER — HYDROXYZINE PAMOATE 25 MG/1
25 CAPSULE ORAL 3 TIMES DAILY PRN
Qty: 90 CAPSULE | Refills: 2 | Status: SHIPPED | OUTPATIENT
Start: 2024-12-18

## 2024-12-18 RX ORDER — ALPRAZOLAM 0.5 MG/1
0.5 TABLET ORAL DAILY PRN
Qty: 10 TABLET | Refills: 0 | Status: SHIPPED | OUTPATIENT
Start: 2024-12-18

## 2024-12-18 RX ORDER — LORAZEPAM 0.5 MG/1
0.5 TABLET ORAL EVERY 6 HOURS PRN
Qty: 5 TABLET | Refills: 0 | Status: SHIPPED | OUTPATIENT
Start: 2024-12-18 | End: 2025-01-17

## 2024-12-18 NOTE — PROGRESS NOTES
OUTPATIENT PSYCHIATRY FOLLOW UP VISIT    ENCOUNTER DATE:  12/18/2024  SITE:  [facility]  LENGTH OF SESSION:  30 minutes      CHIEF COMPLAINT:  anxiety    HISTORY OF PRESENTING ILLNESS:  Scott Gallagher is a 34 y.o. male with history of Generalized Anxiety Disorder who presents for follow up appointment.        Interval history as told by Patient - & or family/friend/spouse/caregiver with pts permission    Reports doing okay during interim. States that he is dealing with significant social stressors including financial strain. States that the water was turned off on his home yesterday which was a major source of stress. Reports intermittent use of PRN Xanax has been helpful regarding his panic attacks/CHEMO. Discussed transitioning from Xanax to longer acting benzodiazepine and or hydroxyzine for which patient expressed understanding.     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 Denies other questions or concerns at the moment. Denies thoughts of self harm or wanting to die.       PSYCHIATRIC REVIEW OF SYSTEMS:(none/ yes- better/worse/stable/& what symptoms)    Symptoms of Depression: stable    Symptoms of Anxiety/ panic attacks: improved    Symptoms of Judit/Hypomania: denies    Symptoms of psychosis: denies    Sleep: stable    Appetite: stable    Psychosocial stressors: work    Risk Parameters:  Patient reports no suicidal ideation  Patient reports no homicidal ideation  Patient reports no self-injurious behavior  Patient reports no violent behavior    PSYCHIATRIC MED REVIEW    Current psych meds  Medication side effects:  anorgasmia on Lexapro  Medication compliance:  n/a    Previous psych meds trials  Xanax, Gabapentin (currently for pain), Lamictal (for ocular aura), Lexapro    PAST PSYCHIATRIC, MEDICAL, AND SOCIAL HISTORY REVIEWED  The patient's past medical, family and social history have been reviewed and updated as  appropriate within the electronic medical record - see encounter notes.    MEDICAL REVIEW OF SYSTEMS:  Complete review of systems performed covering Constitutional, Musculoskeletal, Neurologic.  All systems negative.    ALL MEDICATIONS:    Current Outpatient Medications:     acetaminophen (TYLENOL) 500 MG tablet, Take 1 tablet (500 mg total) by mouth 3 (three) times daily., Disp: 90 tablet, Rfl: 0    ALPRAZolam (XANAX) 0.5 MG tablet, Take 1 tablet (0.5 mg total) by mouth daily as needed for Anxiety. 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 as needed for anxiety, Disp: 10 tablet, Rfl: 0    aspirin 81 MG Chew, Take 81 mg by mouth once daily., Disp: , Rfl:     emtricitabine-tenofovir 200-300 mg (TRUVADA) 200-300 mg Tab, Take 1 tablet by mouth once daily., Disp: 30 tablet, Rfl: 2    gabapentin (NEURONTIN) 300 MG capsule, Take 1-2 capsules (300-600 mg total) by mouth 3 (three) times daily., Disp: 180 capsule, Rfl: 0    methocarbamoL (ROBAXIN) 500 MG Tab, Take 2 tablets (1,000 mg total) by mouth 3 (three) times daily., Disp: 90 tablet, Rfl: 0    methylPREDNISolone (MEDROL DOSEPACK) 4 mg tablet, use as directed, Disp: 1 each, Rfl: 0    olmesartan-amLODIPin-hcthiazid 40-10-25 mg Tab, Take 1 tablet by mouth once daily., Disp: 90 tablet, Rfl: 2    oxyCODONE (ROXICODONE) 5 MG immediate release tablet, Take 1 tablet (5 mg total) by mouth every 6 (six) hours as needed for Pain (for breakthrough pain)., Disp: 21 tablet, Rfl: 0    propranoloL (INDERAL) 10 MG tablet, Take 10 mg by mouth 3 (three) times daily as needed., Disp: , Rfl:     topiramate (TOPAMAX) 50 MG tablet, Take 1 tablet (50 mg total) by mouth once daily. (Patient not taking: Reported on 9/24/2024), Disp: 90 tablet, Rfl: 1    Current Facility-Administered Medications:     Hep A (Havrix) IM vaccine (>/= 20 yo), 1,440 Units, Intramuscular, Q6 Months, , 1,440 Units at 09/24/24 1425    VFC-hpv vaccine,9-robi (GARDASIL 9) vaccine 0.5 mL, 0.5 mL, Intramuscular, vaccine x 1  dose, , 0.5 mL at 12/02/24 1324    ALLERGIES:  Review of patient's allergies indicates:   Allergen Reactions    Anti-nausea [phosphorated carbohydrate] Hives     Pt unsure of name of Rx; states 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       RELEVANT LABS/STUDIES:    Lab Results   Component Value Date    WBC 7.29 09/18/2024    HGB 16.2 09/18/2024    HCT 44.9 09/18/2024    MCV 84 09/18/2024     09/18/2024     BMP  Lab Results   Component Value Date     09/18/2024    K 3.3 (L) 09/18/2024     09/18/2024    CO2 22 (L) 09/18/2024    BUN 10 09/18/2024    CREATININE 0.9 09/18/2024    CALCIUM 9.7 09/18/2024    ANIONGAP 11 09/18/2024    ESTGFRAFRICA >60 12/05/2019    EGFRNONAA >60 12/05/2019     Lab Results   Component Value Date    ALT 43 04/26/2024    AST 18 04/26/2024    ALKPHOS 73 04/26/2024    BILITOT 0.4 04/26/2024     Lab Results   Component Value Date    TSH 2.583 04/26/2024     Lab Results   Component Value Date    HGBA1C 5.5 02/17/2023       VITALS  There were no vitals filed for this visit.      PHYSICAL EXAM  General: well developed, well nourished  Neurologic:   Gait: Normal   Psychomotor signs:  No involuntary movements or tremor  AIMS: none    PSYCHIATRIC EXAM:     Mental Status Exam:  Appearance: unremarkable, age appropriate  Behavior/Cooperation: normal, cooperative  Speech: normal tone, normal rate, normal pitch, normal volume  Language: uses words appropriately; NO aphasia or dysarthria  Mood: steady  Affect: mood congruent  Thought Process: normal and logical  Thought Content: normal, no suicidality, no homicidality, delusions, or paranoia   Level of Consciousness: Alert and Oriented x3  Memory:  Intact  Attention/concentration: appropriate for age/education.   Fund of Knowledge: appears adequate  Insight: Intact  Judgment: Intact       IMPRESSION:    Scott Gallagher is a 34 y.o. male with history of Generalized Anxiety  Disorder who presents for follow up appointment. Denies significant worsening of anxiety during interim, but does report continued moderate anxiety. States that his symptoms are intermittent and are relieved by use of Xanax. Denies previous trial of other benzodiazepine or vistaril for PRN anxiety medication. Reports being amenable of trial of vistaril and low quantity of Ativan. Explained risks and side effects of medications and patient was counseled on risks/recommendation to not combine medications or consume with alcohol for which patient expresses understanding.     Status/Progress:  Based on the examination today, the patient's problem(s) is/are resolving.  New problems have not been presented today.     DIAGNOSES:    ICD-10-CM ICD-9-CM   1. Generalized anxiety disorder with panic attacks  F41.1 300.02    F41.0 300.01           PLAN:  Psych Med:  Continue PRN Alprazolam 0.5 mg PO Daily for breakthrough anxiety  Continue therapy sessions, currently biweekly  Reports tingling sensation described as focal dystonia, if this continues to persist recommend following up outpatient with neurology.   Discussed with patient informed consent, risks vs. benefits, alternative treatments, side effect profile and the inherent unpredictability of individual responses to these treatments. Answered any questions patient may have had. The patient expresses understanding of the above and displays the capacity to agree with this current plan     Other: none    RETURN TO CLINIC:  1 month    Andrey Mckeon MD  [facility], PGY-3    [place]

## 2024-12-20 NOTE — PROGRESS NOTES
I have reviewed the notes, assessments, and/or procedures performed by Dr Andrey Mckeon, I concur with her/his documentation of Scott Gallagher.  Date of Service: 12/18/2024

## 2024-12-27 ENCOUNTER — PATIENT MESSAGE (OUTPATIENT)
Dept: SURGERY | Facility: CLINIC | Age: 34
End: 2024-12-27
Payer: COMMERCIAL

## 2025-01-10 ENCOUNTER — TELEPHONE (OUTPATIENT)
Dept: PHARMACY | Facility: CLINIC | Age: 35
End: 2025-01-10
Payer: COMMERCIAL

## 2025-01-10 NOTE — TELEPHONE ENCOUNTER
[facility] Chart Review & Patient Outreach Details For Medication Adherence Project    Reason for Outreach Encounter: 3rd Party payor non-compliance report ([employer], [employer], C, etc)  2.  Patient Outreach Method: Reviewed patient chart   3.   Medication in question:    Hypertension Medications               olmesartan-amLODIPin-hcthiazid 40-10-25 mg Tab Take 1 tablet by mouth once daily.    propranoloL (INDERAL) 10 MG tablet Take 10 mg by mouth 3 (three) times daily as needed.                 Olmesartan/amlodipine.hctz  last filled  1/2/25 for 90 day supply    4.  Reviewed and or Updates Made To: Patient Chart  5. Outreach Outcomes and/or actions taken: Patient filled medication and is on track to be adherent  Additional Notes:

## 2025-01-31 ENCOUNTER — TELEPHONE (OUTPATIENT)
Facility: CLINIC | Age: 35
End: 2025-01-31
Payer: COMMERCIAL

## 2025-02-03 ENCOUNTER — OFFICE VISIT (OUTPATIENT)
Dept: SURGERY | Facility: CLINIC | Age: 35
End: 2025-02-03
Payer: COMMERCIAL

## 2025-02-03 VITALS
SYSTOLIC BLOOD PRESSURE: 104 MMHG | HEIGHT: 76 IN | WEIGHT: 315 LBS | BODY MASS INDEX: 38.36 KG/M2 | HEART RATE: 74 BPM | DIASTOLIC BLOOD PRESSURE: 67 MMHG

## 2025-02-03 DIAGNOSIS — K60.30 FISTULA-IN-ANO: Primary | ICD-10-CM

## 2025-02-03 PROCEDURE — 1159F MED LIST DOCD IN RCRD: CPT | Mod: CPTII,S$GLB,, | Performed by: SURGERY

## 2025-02-03 PROCEDURE — 99999 PR PBB SHADOW E&M-EST. PATIENT-LVL IV: CPT | Mod: PBBFAC,,, | Performed by: SURGERY

## 2025-02-03 PROCEDURE — 99214 OFFICE O/P EST MOD 30 MIN: CPT | Mod: S$GLB,,, | Performed by: SURGERY

## 2025-02-03 PROCEDURE — 3074F SYST BP LT 130 MM HG: CPT | Mod: CPTII,S$GLB,, | Performed by: SURGERY

## 2025-02-03 PROCEDURE — 3078F DIAST BP <80 MM HG: CPT | Mod: CPTII,S$GLB,, | Performed by: SURGERY

## 2025-02-03 PROCEDURE — 3008F BODY MASS INDEX DOCD: CPT | Mod: CPTII,S$GLB,, | Performed by: SURGERY

## 2025-02-03 NOTE — PROGRESS NOTES
Colon & Rectal Surgery Clinic Follow Up    HPI:   Scott Gallagher is a 34 y.o. 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  Also had a colonoscopy and EGD which showed mild gastritis, no significant findings in the colon.       Objective:   Vitals:    02/03/25 0757   BP: 104/67   Pulse: 74        Physical Exam   Gen: well developed male, NAD  HEENT: normocephalic, atraumatic, PERRL, EOMI   CV: RRR, no murmurs  Resp: nonlabored, CTAB   Abd: soft, NTND   MSK: no gross deformities, no cyanosis or edema   Anorectal: scar in the left anterolateral position without fluctuance or erythema, scar in the anterior midline, SHELBY with intact tone, mild tenderness to palpation of anterior midline, no masses or blood       Assessment and Plan:   Scott Gallagher  is a 34 y.o. 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 We reviewed that ~20% of patients who have had an abscess will develop a fistula.     We reviewed treatment options:  (1) Clinical observation- this would avoid the possible need for multiple procedures, but will a low likelihood that the fistula wound resolve.  (2) Exam under anesthesia- at this time, we reviewed that if <30% of the sphincter were involved I would perform a fistulotomy, which is associated with >90% healing.  If >30% of the sphincter were involved , I would place a seton.  This would remain in place for at least 3 months, and would then require a second procedure for repair (advancement flap or ligation of intersphincteric fistula [LIFT]).  We reviewed that success rates for these procedures are ~60-70%.     We reviewed expectations following the procedure, including pain, bleeding, possible changes in continence to gas or  liquid stool.  Asked if any additional questions, none at this time.    Patient with extensive travel due to work.  Will plan for EUA with possible seton placement vs. Fistulotomy 3/21 at [facility], [place], operated by [facility].  He will consider if he would like to proceed with definitive repair as [place] of [place].  Will re-assess at time of surgery.       Sun Finley MD, FACS   Staff Surgeon   Colon & Rectal Surgery

## 2025-02-12 ENCOUNTER — TELEPHONE (OUTPATIENT)
Dept: ORTHOPEDICS | Facility: CLINIC | Age: 35
End: 2025-02-12
Payer: COMMERCIAL

## 2025-02-14 ENCOUNTER — TELEPHONE (OUTPATIENT)
Dept: ORTHOPEDICS | Facility: CLINIC | Age: 35
End: 2025-02-14
Payer: COMMERCIAL

## 2025-02-14 NOTE — TELEPHONE ENCOUNTER
Spoke with patient and informed that text message for a Patient IQ questionnaire will be sent to phone now and for patient to fill out form, patient verbalized understanding and confirmed.

## 2025-02-18 ENCOUNTER — ANESTHESIA EVENT (OUTPATIENT)
Dept: SURGERY | Facility: OTHER | Age: 35
End: 2025-02-18
Payer: COMMERCIAL

## 2025-02-24 DIAGNOSIS — I10 HYPERTENSION, ESSENTIAL: Primary | ICD-10-CM

## 2025-02-24 NOTE — TELEPHONE ENCOUNTER
No care due was identified.  [facility] Embedded Care Due Messages. Reference number: [id number].   2/24/2025 11:16:37 AM CST

## 2025-02-25 RX ORDER — OLMESARTAN MEDOXOMIL, AMLODIPINE AND HYDROCHLOROTHIAZIDE TABLET 40/10/25 MG 40; 10; 25 MG/1; MG/1; MG/1
1 TABLET ORAL DAILY
Qty: 90 TABLET | Refills: 3 | Status: SHIPPED | OUTPATIENT
Start: 2025-02-25 | End: 2025-02-27

## 2025-02-27 DIAGNOSIS — I10 HYPERTENSION, ESSENTIAL: ICD-10-CM

## 2025-02-27 RX ORDER — OLMESARTAN MEDOXOMIL, AMLODIPINE AND HYDROCHLOROTHIAZIDE TABLET 40/10/25 MG 40; 10; 25 MG/1; MG/1; MG/1
1 TABLET ORAL DAILY
Qty: 90 TABLET | Refills: 1 | Status: SHIPPED | OUTPATIENT
Start: 2025-02-27

## 2025-02-27 NOTE — TELEPHONE ENCOUNTER
"Refill Routing Note   Medication(s) are not appropriate for processing by [facility] for the following reason(s):        Required labs abnormal    [facility] action(s):  Defer        Medication Therapy Plan: Previously set to "print." Reset to "normal"      Appointments  past 12m or future 3m with PCP    Date Provider   Last Visit   9/24/2024 Stalin Gordillo MD   Next Visit   Visit date not found Stalin Gordillo MD   ED visits in past 90 days: 0        Note composed:2:01 PM 02/27/2025          "

## 2025-02-27 NOTE — TELEPHONE ENCOUNTER
No care due was identified.  [facility] Embedded Care Due Messages. Reference number: [id number].   2/27/2025 1:35:19 PM CST

## 2025-03-04 ENCOUNTER — PATIENT MESSAGE (OUTPATIENT)
Dept: ORTHOPEDICS | Facility: CLINIC | Age: 35
End: 2025-03-04
Payer: COMMERCIAL

## 2025-03-11 ENCOUNTER — PATIENT MESSAGE (OUTPATIENT)
Dept: ORTHOPEDICS | Facility: CLINIC | Age: 35
End: 2025-03-11
Payer: COMMERCIAL

## 2025-03-13 NOTE — PRE-PROCEDURE INSTRUCTIONS
Pre admit phone call completed.    Instructions given to patient about NPO status as follows:     The evening before surgery do not eat anything after 9 p.m. ( this includes hard candy, chewing gum and mints).  You may only have GATORADE, POWERADE AND WATER from 9 p.m. until you leave your home. DO NOT  DRINK ANY LIQUIDS ON THE WAY TO THE HOSPITAL.      Patient was also instructed on the below information:    Park in the Parking lot behind the hospital or in the [employer] [facility] across the street from the parking lot.  Parking is complimentary.  If you will be discharged the same day as your procedure, please arrange for a responsible adult to drive you home or  to accompany you if traveling by taxi.  YOU WILL NOT BE PERMITTED TO DRIVE OR TO LEAVE THE HOSPITAL ALONE AFTER SURGERY.  It is strongly recommended that you arrange for someone to remain with you for the first 24 hrs following your surgery.    Patient verbalized understanding of above instructions.

## 2025-03-20 ENCOUNTER — OFFICE VISIT (OUTPATIENT)
Dept: ORTHOPEDICS | Facility: CLINIC | Age: 35
End: 2025-03-20
Payer: COMMERCIAL

## 2025-03-20 ENCOUNTER — TELEPHONE (OUTPATIENT)
Dept: SURGERY | Facility: CLINIC | Age: 35
End: 2025-03-20
Payer: COMMERCIAL

## 2025-03-20 VITALS — BODY MASS INDEX: 39.17 KG/M2 | WEIGHT: 315 LBS | HEIGHT: 75 IN

## 2025-03-20 DIAGNOSIS — M54.9 DORSALGIA, UNSPECIFIED: Primary | ICD-10-CM

## 2025-03-20 PROCEDURE — 1159F MED LIST DOCD IN RCRD: CPT | Mod: CPTII,S$GLB,, | Performed by: ORTHOPAEDIC SURGERY

## 2025-03-20 PROCEDURE — 99213 OFFICE O/P EST LOW 20 MIN: CPT | Mod: S$GLB,,, | Performed by: ORTHOPAEDIC SURGERY

## 2025-03-20 PROCEDURE — 99999 PR PBB SHADOW E&M-EST. PATIENT-LVL IV: CPT | Mod: PBBFAC,,, | Performed by: ORTHOPAEDIC SURGERY

## 2025-03-20 PROCEDURE — 3008F BODY MASS INDEX DOCD: CPT | Mod: CPTII,S$GLB,, | Performed by: ORTHOPAEDIC SURGERY

## 2025-03-20 NOTE — TELEPHONE ENCOUNTER
Spoke with pt regarding 1130 arrival time and location for surgery. Reviewed bowel prep. Advised to complete Fleet enema in the morning before coming in, but to plan for enough time for shower and driving. Advised for this surgery, there is no need for new linen like for his previous spine surgery. Pt verbalized understanding to all. Denies further questions at this time.

## 2025-03-20 NOTE — PROGRESS NOTES
Date: 03/20/2025    Supervising Physician: Raleigh Bond M.D.    Date of Surgery: 9/30/24     Procedure: L4-5 discectomy    History: Scott Gallagher is seen today for follow-up following the above listed procedure. Overall the patient is doing well but today notes he was doing ok until a few weeks ago when he bent down and started having pain in his low back and left leg similar to before surgery.   He is taking 900mg gabapentin TID and robaxin with no relief. He also tried a medrol dose pack    Exam: 4/5 strength in left leg.  Incision is healing well, clean, dry and intact.   There is no sign of infection. Neuro exam is stable. No signs of DVT.    Assessment/Plan: 6 months post op. Concern for re herniation. Will obtain STAT MRI to further evaluate    Thank you for the opportunity to participate in this patient's care. Please give me a call if there are any concerns or questions.

## 2025-03-21 ENCOUNTER — HOSPITAL ENCOUNTER (OUTPATIENT)
Facility: OTHER | Age: 35
Discharge: HOME | End: 2025-03-21
Attending: SURGERY | Admitting: SURGERY
Payer: COMMERCIAL

## 2025-03-21 ENCOUNTER — PATIENT MESSAGE (OUTPATIENT)
Dept: SURGERY | Facility: CLINIC | Age: 35
End: 2025-03-21

## 2025-03-21 ENCOUNTER — HOSPITAL ENCOUNTER (OUTPATIENT)
Dept: RADIOLOGY | Facility: HOSPITAL | Age: 35
Discharge: HOME | End: 2025-03-21
Attending: ORTHOPAEDIC SURGERY | Admitting: SURGERY
Payer: COMMERCIAL

## 2025-03-21 ENCOUNTER — PATIENT MESSAGE (OUTPATIENT)
Dept: ORTHOPEDICS | Facility: CLINIC | Age: 35
End: 2025-03-21
Payer: COMMERCIAL

## 2025-03-21 ENCOUNTER — ANESTHESIA (OUTPATIENT)
Dept: SURGERY | Facility: OTHER | Age: 35
End: 2025-03-21
Payer: COMMERCIAL

## 2025-03-21 DIAGNOSIS — M54.9 DORSALGIA, UNSPECIFIED: ICD-10-CM

## 2025-03-21 DIAGNOSIS — K60.30 ANAL FISTULA: ICD-10-CM

## 2025-03-21 DIAGNOSIS — Z01.818 PRE-OP TESTING: Primary | ICD-10-CM

## 2025-03-21 DIAGNOSIS — K60.30 FISTULA-IN-ANO: ICD-10-CM

## 2025-03-21 LAB
BASOPHILS # BLD AUTO: 0.03 K/UL (ref 0–0.2)
BASOPHILS NFR BLD: 0.4 % (ref 0–1.9)
DIFFERENTIAL METHOD BLD: ABNORMAL
EOSINOPHIL # BLD AUTO: 0.1 K/UL (ref 0–0.5)
EOSINOPHIL NFR BLD: 1.8 % (ref 0–8)
ERYTHROCYTE [DISTWIDTH] IN BLOOD BY AUTOMATED COUNT: 11.9 % (ref 11.5–14.5)
HCT VFR BLD AUTO: 45.1 % (ref 40–54)
HGB BLD-MCNC: 16.8 G/DL (ref 14–18)
IMM GRANULOCYTES # BLD AUTO: 0.01 K/UL (ref 0–0.04)
IMM GRANULOCYTES NFR BLD AUTO: 0.1 % (ref 0–0.5)
LYMPHOCYTES # BLD AUTO: 2 K/UL (ref 1–4.8)
LYMPHOCYTES NFR BLD: 28.1 % (ref 18–48)
MCH RBC QN AUTO: 32.2 PG (ref 27–31)
MCHC RBC AUTO-ENTMCNC: 37.3 G/DL (ref 32–36)
MCV RBC AUTO: 87 FL (ref 82–98)
MONOCYTES # BLD AUTO: 0.4 K/UL (ref 0.3–1)
MONOCYTES NFR BLD: 5.6 % (ref 4–15)
NEUTROPHILS # BLD AUTO: 4.6 K/UL (ref 1.8–7.7)
NEUTROPHILS NFR BLD: 64 % (ref 38–73)
NRBC BLD-RTO: 0 /100 WBC
PLATELET # BLD AUTO: 255 K/UL (ref 150–450)
PMV BLD AUTO: 10.3 FL (ref 9.2–12.9)
RBC # BLD AUTO: 5.21 M/UL (ref 4.6–6.2)
WBC # BLD AUTO: 7.18 K/UL (ref 3.9–12.7)

## 2025-03-21 PROCEDURE — 71000016 HC POSTOP RECOV ADDL HR: Performed by: SURGERY

## 2025-03-21 PROCEDURE — 37000009 HC ANESTHESIA EA ADD 15 MINS: Performed by: SURGERY

## 2025-03-21 PROCEDURE — 25000003 PHARM REV CODE 250: Performed by: STUDENT IN AN ORGANIZED HEALTH CARE EDUCATION/TRAINING PROGRAM

## 2025-03-21 PROCEDURE — 37000008 HC ANESTHESIA 1ST 15 MINUTES: Performed by: SURGERY

## 2025-03-21 PROCEDURE — 71000033 HC RECOVERY, INTIAL HOUR: Performed by: SURGERY

## 2025-03-21 PROCEDURE — 63600175 PHARM REV CODE 636 W HCPCS: Performed by: SURGERY

## 2025-03-21 PROCEDURE — 25000003 PHARM REV CODE 250: Performed by: NURSE PRACTITIONER

## 2025-03-21 PROCEDURE — 63600175 PHARM REV CODE 636 W HCPCS: Mod: JZ,TB | Performed by: ANESTHESIOLOGY

## 2025-03-21 PROCEDURE — 63600175 PHARM REV CODE 636 W HCPCS: Performed by: NURSE ANESTHETIST, CERTIFIED REGISTERED

## 2025-03-21 PROCEDURE — 36000706: Performed by: SURGERY

## 2025-03-21 PROCEDURE — 36000707: Performed by: SURGERY

## 2025-03-21 PROCEDURE — 25000003 PHARM REV CODE 250: Performed by: NURSE ANESTHETIST, CERTIFIED REGISTERED

## 2025-03-21 PROCEDURE — 72148 MRI LUMBAR SPINE W/O DYE: CPT | Mod: TC

## 2025-03-21 PROCEDURE — 72148 MRI LUMBAR SPINE W/O DYE: CPT | Mod: 26,,, | Performed by: RADIOLOGY

## 2025-03-21 PROCEDURE — 85025 COMPLETE CBC W/AUTO DIFF WBC: CPT | Performed by: ANESTHESIOLOGY

## 2025-03-21 PROCEDURE — 63600175 PHARM REV CODE 636 W HCPCS: Performed by: STUDENT IN AN ORGANIZED HEALTH CARE EDUCATION/TRAINING PROGRAM

## 2025-03-21 PROCEDURE — 71000015 HC POSTOP RECOV 1ST HR: Performed by: SURGERY

## 2025-03-21 PROCEDURE — 36415 COLL VENOUS BLD VENIPUNCTURE: CPT | Performed by: ANESTHESIOLOGY

## 2025-03-21 PROCEDURE — 46275 REMOVE ANAL FIST INTER: CPT | Mod: ,,, | Performed by: SURGERY

## 2025-03-21 PROCEDURE — 25000003 PHARM REV CODE 250: Performed by: ANESTHESIOLOGY

## 2025-03-21 RX ORDER — MEPERIDINE HYDROCHLORIDE 25 MG/ML
12.5 INJECTION INTRAMUSCULAR; INTRAVENOUS; SUBCUTANEOUS ONCE AS NEEDED
Status: DISCONTINUED | OUTPATIENT
Start: 2025-03-21 | End: 2025-03-21 | Stop reason: HOSPADM

## 2025-03-21 RX ORDER — DEXAMETHASONE SODIUM PHOSPHATE 4 MG/ML
INJECTION, SOLUTION INTRA-ARTICULAR; INTRALESIONAL; INTRAMUSCULAR; INTRAVENOUS; SOFT TISSUE
Status: DISCONTINUED | OUTPATIENT
Start: 2025-03-21 | End: 2025-03-21

## 2025-03-21 RX ORDER — SODIUM CHLORIDE, SODIUM LACTATE, POTASSIUM CHLORIDE, CALCIUM CHLORIDE 600; 310; 30; 20 MG/100ML; MG/100ML; MG/100ML; MG/100ML
INJECTION, SOLUTION INTRAVENOUS CONTINUOUS
Status: DISCONTINUED | OUTPATIENT
Start: 2025-03-21 | End: 2025-03-21 | Stop reason: HOSPADM

## 2025-03-21 RX ORDER — GLUCAGON 1 MG
1 KIT INJECTION
Status: DISCONTINUED | OUTPATIENT
Start: 2025-03-21 | End: 2025-03-21 | Stop reason: HOSPADM

## 2025-03-21 RX ORDER — SODIUM CHLORIDE 0.9 % (FLUSH) 0.9 %
3 SYRINGE (ML) INJECTION
Status: DISCONTINUED | OUTPATIENT
Start: 2025-03-21 | End: 2025-03-21 | Stop reason: HOSPADM

## 2025-03-21 RX ORDER — DEXMEDETOMIDINE HYDROCHLORIDE 100 UG/ML
INJECTION, SOLUTION INTRAVENOUS
Status: DISCONTINUED | OUTPATIENT
Start: 2025-03-21 | End: 2025-03-21

## 2025-03-21 RX ORDER — OXYCODONE HYDROCHLORIDE 5 MG/1
5 TABLET ORAL
Status: DISCONTINUED | OUTPATIENT
Start: 2025-03-21 | End: 2025-03-21 | Stop reason: HOSPADM

## 2025-03-21 RX ORDER — PROPOFOL 10 MG/ML
VIAL (ML) INTRAVENOUS
Status: DISCONTINUED | OUTPATIENT
Start: 2025-03-21 | End: 2025-03-21

## 2025-03-21 RX ORDER — ROCURONIUM BROMIDE 10 MG/ML
INJECTION, SOLUTION INTRAVENOUS
Status: DISCONTINUED | OUTPATIENT
Start: 2025-03-21 | End: 2025-03-21

## 2025-03-21 RX ORDER — ONDANSETRON HYDROCHLORIDE 2 MG/ML
4 INJECTION, SOLUTION INTRAVENOUS DAILY PRN
Status: DISCONTINUED | OUTPATIENT
Start: 2025-03-21 | End: 2025-03-21 | Stop reason: HOSPADM

## 2025-03-21 RX ORDER — HYDROMORPHONE HYDROCHLORIDE 2 MG/ML
0.4 INJECTION, SOLUTION INTRAMUSCULAR; INTRAVENOUS; SUBCUTANEOUS EVERY 5 MIN PRN
Status: DISCONTINUED | OUTPATIENT
Start: 2025-03-21 | End: 2025-03-21 | Stop reason: HOSPADM

## 2025-03-21 RX ORDER — ONDANSETRON HYDROCHLORIDE 2 MG/ML
INJECTION, SOLUTION INTRAVENOUS
Status: DISCONTINUED | OUTPATIENT
Start: 2025-03-21 | End: 2025-03-21

## 2025-03-21 RX ORDER — LIDOCAINE HYDROCHLORIDE 20 MG/ML
INJECTION INTRAVENOUS
Status: DISCONTINUED | OUTPATIENT
Start: 2025-03-21 | End: 2025-03-21

## 2025-03-21 RX ORDER — SODIUM CHLORIDE 9 MG/ML
INJECTION, SOLUTION INTRAVENOUS CONTINUOUS
Status: DISCONTINUED | OUTPATIENT
Start: 2025-03-21 | End: 2025-03-21 | Stop reason: HOSPADM

## 2025-03-21 RX ORDER — LIDOCAINE HYDROCHLORIDE 10 MG/ML
1 INJECTION, SOLUTION EPIDURAL; INFILTRATION; INTRACAUDAL; PERINEURAL ONCE
Status: DISCONTINUED | OUTPATIENT
Start: 2025-03-21 | End: 2025-03-21 | Stop reason: HOSPADM

## 2025-03-21 RX ORDER — FENTANYL CITRATE 50 UG/ML
INJECTION, SOLUTION INTRAMUSCULAR; INTRAVENOUS
Status: DISCONTINUED | OUTPATIENT
Start: 2025-03-21 | End: 2025-03-21

## 2025-03-21 RX ORDER — OXYCODONE HYDROCHLORIDE 5 MG/1
5 TABLET ORAL EVERY 4 HOURS PRN
Qty: 20 TABLET | Refills: 0 | Status: SHIPPED | OUTPATIENT
Start: 2025-03-21 | End: 2025-03-27

## 2025-03-21 RX ORDER — OLMESARTAN MEDOXOMIL, AMLODIPINE AND HYDROCHLOROTHIAZIDE TABLET 40/10/25 MG 40; 10; 25 MG/1; MG/1; MG/1
TABLET ORAL
COMMUNITY

## 2025-03-21 RX ORDER — BUPIVACAINE HYDROCHLORIDE 2.5 MG/ML
INJECTION, SOLUTION EPIDURAL; INFILTRATION; INTRACAUDAL; PERINEURAL
Status: DISCONTINUED | OUTPATIENT
Start: 2025-03-21 | End: 2025-03-21 | Stop reason: HOSPADM

## 2025-03-21 RX ORDER — MUPIROCIN 20 MG/G
OINTMENT TOPICAL
Status: DISCONTINUED | OUTPATIENT
Start: 2025-03-21 | End: 2025-03-21 | Stop reason: HOSPADM

## 2025-03-21 RX ORDER — SUCCINYLCHOLINE CHLORIDE 20 MG/ML
INJECTION INTRAMUSCULAR; INTRAVENOUS
Status: DISCONTINUED | OUTPATIENT
Start: 2025-03-21 | End: 2025-03-21

## 2025-03-21 RX ADMIN — DEXAMETHASONE SODIUM PHOSPHATE 8 MG: 4 INJECTION, SOLUTION INTRAMUSCULAR; INTRAVENOUS at 01:03

## 2025-03-21 RX ADMIN — HYDROMORPHONE HYDROCHLORIDE 0.4 MG: 2 INJECTION, SOLUTION INTRAMUSCULAR; INTRAVENOUS; SUBCUTANEOUS at 02:03

## 2025-03-21 RX ADMIN — DEXMEDETOMIDINE HYDROCHLORIDE 10 MCG: 100 INJECTION, SOLUTION, CONCENTRATE INTRAVENOUS at 01:03

## 2025-03-21 RX ADMIN — SUGAMMADEX 200 MG: 100 INJECTION, SOLUTION INTRAVENOUS at 02:03

## 2025-03-21 RX ADMIN — SUCCINYLCHOLINE CHLORIDE 160 MG: 20 INJECTION, SOLUTION INTRAMUSCULAR; INTRAVENOUS at 01:03

## 2025-03-21 RX ADMIN — LIDOCAINE HYDROCHLORIDE 100 MG: 20 INJECTION, SOLUTION INTRAVENOUS at 01:03

## 2025-03-21 RX ADMIN — PROPOFOL 200 MG: 10 INJECTION, EMULSION INTRAVENOUS at 01:03

## 2025-03-21 RX ADMIN — CARBOXYMETHYLCELLULOSE SODIUM 2 DROP: 2.5 SOLUTION/ DROPS OPHTHALMIC at 01:03

## 2025-03-21 RX ADMIN — OXYCODONE HYDROCHLORIDE 5 MG: 5 TABLET ORAL at 02:03

## 2025-03-21 RX ADMIN — ONDANSETRON HYDROCHLORIDE 4 MG: 2 INJECTION INTRAMUSCULAR; INTRAVENOUS at 01:03

## 2025-03-21 RX ADMIN — SODIUM CHLORIDE, SODIUM LACTATE, POTASSIUM CHLORIDE, AND CALCIUM CHLORIDE: .6; .31; .03; .02 INJECTION, SOLUTION INTRAVENOUS at 01:03

## 2025-03-21 RX ADMIN — ROCURONIUM BROMIDE 10 MG: 10 INJECTION, SOLUTION INTRAVENOUS at 01:03

## 2025-03-21 RX ADMIN — GLYCOPYRROLATE 0.2 MG: 0.2 INJECTION, SOLUTION INTRAMUSCULAR; INTRAVITREAL at 01:03

## 2025-03-21 RX ADMIN — FENTANYL CITRATE 100 MCG: 50 INJECTION, SOLUTION INTRAMUSCULAR; INTRAVENOUS at 01:03

## 2025-03-21 RX ADMIN — MUPIROCIN: 20 OINTMENT TOPICAL at 11:03

## 2025-03-21 NOTE — OP NOTE
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       Surgeon: Sun Finley MD   Assistant:  None    Indication: Mr. 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 seton. The benefits, risks, and alternatives were discussed with the patient, they were given the opportunity to ask questions and they elected to proceed with operative intervention after signing written consent.    Procedure:  After pre-operative assessment and review of informed consent, the patient was taken to the operating room and received general anesthesia. Pre-operative antibiotics were administered if indicated and the patient was placed in lithotomy position. The perineum was prepped and draped in the usual sterile fashion and a timeout was performed according to [facility] Quality and Safety guidelines.      A perianal block was performed with 0.25% marcaine.  Anoscopy was performed.  There was a clear dimple in the anal canal in the distal anterior midline.  The external defect was cannulated with a fistula probe and the probe passed into the internal defect.  This was largely external and did not involve a significant amount of internal sphincter.  Fistulotomy was performed with electrocautery.  The base of the defect was cauterized and the wound edges marsupialized with interrupted vicryl suture.  Hemostasis was obtained.      Prior to closure and after final closure instrument, needle, and sponge counts were correct.    The procedure was completed without complication and was well-tolerated. The patient was then brought to the post-anesthesia care unit in stable condition. I was present for the entire  operation.    Complications: None  Estimated blood loss: 10 mL  Disposition: PACU    Sun Finley MD, FACS  Staff Surgeon   Colon & Rectal Surgery

## 2025-03-21 NOTE — PLAN OF CARE
Scott Gallagher has met all discharge criteria from Phase II. Vital Signs are stable, ambulating  without difficulty. Discharge instructions given, patient verbalized understanding. Discharged from facility via wheelchair in stable condition.

## 2025-03-21 NOTE — ANESTHESIA PROCEDURE NOTES
Intubation    Date/Time: 3/21/2025 1:36 PM    Performed by: Apolinar Lopez CRNA  Authorized by: Kevon Conte MD    Intubation:     Induction:  Intravenous    Intubated:  Postinduction    Mask Ventilation:  Easy mask    Attempts:  1    Method of Intubation:  Video laryngoscopy    Blade:  Rajan 3    Laryngeal View Grade: Grade I - full view of cords      Difficult Airway Encountered?: No      Complications:  None    Airway Device:  Oral endotracheal tube    Airway Device Size:  8.0    Style/Cuff Inflation:  Cuffed    Inflation Amount (mL):  4    Tube secured:  23.5    Secured at:  The lips    Placement Verified By:  Capnometry    Complicating Factors:  None    Findings Post-Intubation:  BS equal bilateral and atraumatic/condition of teeth unchanged

## 2025-03-21 NOTE — BRIEF OP NOTE
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 Take tylenol and ibuprofen for pain.  Take prescription medication for breakthrough pain.  Take sitz baths as needed for comfort.   2. Take capful of miralax daily while taking pain medication.  Increase to once capful twice a day if no bowel movement in 24-48 hours.   3.  Some blood per rectum is expected after this procedure and may last for 1-2 weeks post op.  Call if bleeding is > 1 cup.   4. Call [phone] for worsening pain, inability to urinate or fever > 101.  Call or [facility] for 4 weeks after surgery     Sun Finley MD  Staff Surgeon  Colon & Rectal Surgery

## 2025-03-21 NOTE — DISCHARGE INSTRUCTIONS
Discharge instructions:   Anal Surgery Post Op Instructions:     1. Take tylenol and ibuprofen for pain.  Take prescription medication for breakthrough pain.  Take sitz baths as needed for comfort.   2. Take capful of miralax daily while taking pain medication.  Increase to once capful twice a day if no bowel movement in 24-48 hours.   3.  Some blood per rectum is expected after this procedure and may last for 1-2 weeks post op.  Call if bleeding is > 1 cup.   4. Call [phone] for worsening pain, inability to urinate or fever > 101.  Call or schedule follow up via [facility] for 4 weeks after surgery      Sun Finley MD  Staff Surgeon  Colon & Rectal Surgery

## 2025-03-21 NOTE — ANESTHESIA PREPROCEDURE EVALUATION
03/21/2025  Scott Gallagher is a 34 y.o., male.      Pre-op Assessment    I have reviewed the Patient Summary Reports.     I have reviewed the Nursing Notes. I have reviewed the NPO Status.   I have reviewed the Medications.     Review of Systems  Anesthesia Hx:             Denies Family Hx of Anesthesia complications.    Denies Personal Hx of Anesthesia complications.                    Social:  Non-Smoker marijuana      Hematology/Oncology:  Hematology Normal   Oncology Normal                                   Cardiovascular:     Hypertension                                          Pulmonary:  Pulmonary Normal                       Renal/:  Renal/ Normal                 Hepatic/GI:  Hepatic/GI Normal                    Musculoskeletal:         Spine Disorders: lumbar            Neurological:   CVA Neuromuscular Disease,  Headaches     L carotid arterty dissection 2023                            Endocrine:        Morbid Obesity / BMI > 40      Physical Exam  General: Cooperative, Alert and Oriented    Airway:  Mallampati: II   Mouth Opening: Normal  TM Distance: Normal  Tongue: Normal  Neck ROM: Normal ROM    Dental:  Intact, Caps / Implants        Anesthesia Plan  Type of Anesthesia, risks & benefits discussed:    Anesthesia Type: Gen ETT  Intra-op Monitoring Plan: Standard ASA Monitors  Induction:  IV  Airway Plan: Video, Post-Induction  Informed Consent: Informed consent signed with the Patient and all parties understand the risks and agree with anesthesia plan.  All questions answered.   ASA Score: 3    Ready For Surgery From Anesthesia Perspective.     .

## 2025-03-21 NOTE — ANESTHESIA POSTPROCEDURE EVALUATION
Anesthesia Post Evaluation    Patient: Scott Gallagher    Procedure(s) Performed: Procedure(s) (LRB):  Exam under anesthesia, possible seton placement vs. fistulotomy (N/A)  FISTULOTOMY, ANAL    Final Anesthesia Type: general      Patient location during evaluation: PACU  Patient participation: Yes- Able to Participate  Level of consciousness: awake and alert  Post-procedure vital signs: reviewed and stable  Pain management: adequate  Airway patency: patent  BARBARA mitigation strategies: Extubation while patient is awake  PONV status at discharge: No PONV  Anesthetic complications: no      Cardiovascular status: hemodynamically stable  Respiratory status: unassisted  Hydration status: euvolemic  Follow-up not needed.              Vitals Value Taken Time   /86 03/21/25 15:00   Temp 36.7 °C (98 °F) 03/21/25 15:00   Pulse 67 03/21/25 15:00   Resp 17 03/21/25 15:00   SpO2 100 % 03/21/25 15:00         Event Time   Out of Recovery 14:54:00         Pain/Jasmeet Score: Pain Rating Prior to Med Admin: 6 (3/21/2025  2:40 PM)  Pain Rating Post Med Admin: 3 (3/21/2025  2:40 PM)  Jasmeet Score: 10 (3/21/2025  3:00 PM)

## 2025-03-21 NOTE — TRANSFER OF CARE
"Anesthesia Transfer of Care Note    Patient: Scott Gallagher    Procedure(s) Performed: Procedure(s) (LRB):  Exam under anesthesia, possible seton placement vs. fistulotomy (N/A)  FISTULOTOMY, ANAL    Patient location: PACU    Anesthesia Type: general    Transport from OR: Transported from OR on room air with adequate spontaneous ventilation    Post pain: adequate analgesia    Post assessment: no apparent anesthetic complications and tolerated procedure well    Post vital signs: stable    Level of consciousness: awake and alert    Nausea/Vomiting: no nausea/vomiting    Complications: none    Transfer of care protocol was followed      Last vitals: Visit Vitals  /78 (BP Location: Right arm, Patient Position: Lying)   Pulse 76   Temp 36.4 °C (97.6 °F) (Temporal)   Resp 16   Ht 6' 3" (1.905 m)   Wt (!) 145.2 kg (320 lb)   SpO2 95%   BMI 40.00 kg/m²     "

## 2025-03-21 NOTE — H&P
[facility] Follow Up     HPI:   Scott Gallagher is a 34 y.o. 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  Otherwise fistula has been stable.         Objective:   Vitals:    03/21/25 1155   BP: 130/82   Pulse: 76   Resp: 18   Temp: 97.7 °F (36.5 °C)          Physical Exam   Gen: well developed male, NAD  HEENT: normocephalic, atraumatic, PERRL, EOMI   CV: RRR, no murmurs  Resp: nonlabored, CTAB   Abd: soft, NTND   MSK: no gross deformities, no cyanosis or edema        Assessment and Plan:   Scott Gallagher  is a 34 y.o. 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 We reviewed that ~20% of patients who have had an abscess will develop a fistula.      We reviewed treatment options:  (1) Clinical observation- this would avoid the possible need for multiple procedures, but will a low likelihood that the fistula wound resolve.  (2) Exam under anesthesia- at this time, we reviewed that if <30% of the sphincter were involved I would perform a fistulotomy, which is associated with >90% healing.  If >30% of the sphincter were involved , I would place a seton.  This would remain in place for at least 3 months, and would then require a second procedure for repair (advancement flap or ligation of intersphincteric fistula [LIFT]).  We reviewed that success rates for these procedures are ~60-70%.      We reviewed expectations following the procedure, including pain, bleeding, possible changes in continence to gas or liquid stool.  Asked if any additional questions, none at this time.     Patient with extensive travel due to work.  Will plan for EUA with possible seton placement vs. Fistulotomy 3/21 at [facility].  He will consider if he would like to proceed with definitive repair as he is considering  [place].  Will re-assess at time of surgery.            To OR today for EUA with fistulotomy vs. Seton

## 2025-03-22 ENCOUNTER — PATIENT MESSAGE (OUTPATIENT)
Dept: PSYCHIATRY | Facility: CLINIC | Age: 35
End: 2025-03-22
Payer: COMMERCIAL

## 2025-03-22 VITALS
OXYGEN SATURATION: 99 % | TEMPERATURE: 98 F | DIASTOLIC BLOOD PRESSURE: 92 MMHG | RESPIRATION RATE: 17 BRPM | SYSTOLIC BLOOD PRESSURE: 132 MMHG | HEART RATE: 78 BPM | HEIGHT: 75 IN | BODY MASS INDEX: 39.17 KG/M2 | WEIGHT: 315 LBS

## 2025-03-22 DIAGNOSIS — F41.0 GENERALIZED ANXIETY DISORDER WITH PANIC ATTACKS: Primary | ICD-10-CM

## 2025-03-22 DIAGNOSIS — F41.1 GENERALIZED ANXIETY DISORDER WITH PANIC ATTACKS: Primary | ICD-10-CM

## 2025-03-24 ENCOUNTER — TELEPHONE (OUTPATIENT)
Dept: PAIN MEDICINE | Facility: CLINIC | Age: 35
End: 2025-03-24
Payer: COMMERCIAL

## 2025-03-24 DIAGNOSIS — M54.16 LUMBAR RADICULOPATHY, CHRONIC: ICD-10-CM

## 2025-03-24 DIAGNOSIS — Z98.890 S/P LUMBAR DISCECTOMY: Primary | ICD-10-CM

## 2025-03-24 RX ORDER — BACLOFEN 10 MG/1
10 TABLET ORAL 3 TIMES DAILY
Qty: 90 TABLET | Refills: 0 | Status: SHIPPED | OUTPATIENT
Start: 2025-03-24 | End: 2026-03-24

## 2025-03-24 NOTE — PROGRESS NOTES
Spoke with pt virtually. Pt was last seen 3/20/25 and continues to have left leg pain. Pt has tried home exercises and gabapentin with no relief. Pain is 8/10. I provided the patient with a home exercise program. It is the [employer] spine conditioning program. Exercises include head rolls, kneeling back extension, sitting rotation stretch, modified seated side straddle, knee to chest, bird dog, plank, modified seated plank, hip bridges, abdominal bracing, and abdominal crunch. Pt completes each exercise daily for one hour with worsening of pain. He is sp L4-5 discectomy on 9/30/24. MRI demonstrates recurrent disc herniation at L4-5. Will order STAT left L4-5 TFESI with pain management. Pt will fu if pain persists.

## 2025-03-24 NOTE — TELEPHONE ENCOUNTER
----- Message from Lupe Rodriguez PA-C sent at 3/24/2025  9:24 AM CDT -----  Regarding: Order for LIZ MEZA    Patient Name: LIZ MEZA([id number])  Sex: Male  : 1990      PCP: LETA KAYE    Center: [facility]     Types of orders made on 2025: Procedure Request    Order Date:3/24/2025  Ordering User:LUPE RODRIGUEZ [[id number]]  Encounter Provider:Lupe Rodriguez PA-C [[id number]]  Authorizing Provider: Lupe Rodriguez PA-C [[id number]]  Supervising Provider:IOANA SWANSON [[id number]]  Type of Supervision:Supervision Required  Department:[facility][[id number]]    Common Order Information  Procedure -> Transforaminal Injection (Specify level and laterality) Cmt: left             L4-5    Order Specific Information  Order: Procedure Order to Pain Management [Custom: [id number]]  Order #:          [id number]: 1 FUTURE    Priority: STAT  Class: Clinic Performed    Future Order Information      Expires on:2026            Expected by:2025                   Associated Diagnoses      Z98.890 S/P lumbar discectomy      M54.16 Lumbar radiculopathy, chronic      Facility Name: -> [facility]           Priority: STAT  Class: Clinic Performed    Future Order Information      Expires on:2026            Expected by:2025                   Associated Diagnoses      Z98.890 S/P lumbar discectomy      M54.16 Lumbar radiculopathy, chronic      Procedure -> Transforaminal Injection (Specify level and laterality) Cmt:                 left L4-5        Facility Name: -> [facility]

## 2025-03-25 ENCOUNTER — TELEPHONE (OUTPATIENT)
Dept: PAIN MEDICINE | Facility: HOSPITAL | Age: 35
End: 2025-03-25
Payer: COMMERCIAL

## 2025-03-25 ENCOUNTER — TELEPHONE (OUTPATIENT)
Dept: PAIN MEDICINE | Facility: CLINIC | Age: 35
End: 2025-03-25
Payer: COMMERCIAL

## 2025-03-25 NOTE — TELEPHONE ENCOUNTER
Called and spoke to patient about the risks of his upcoming procedure.  Specifically since he is status post fistulotomy on 03/21/2025.  Patient has been cleared for procedure by his general surgeon.  Patient is afebrile at this time and states that his pain has become incapacitating to where he is no longer able to work.  We discussed the risks of his procedures specifically epidural abscess and consequences.  Patient acknowledged and wishes to proceed with procedure.

## 2025-03-25 NOTE — TELEPHONE ENCOUNTER
----- Message from Sun Finley MD sent at 3/24/2025 11:06 AM CDT -----  Regarding: RE: Clearance  No restrictions from my standpoint.  Can be scheduled at any time.  ----- Message -----  From: Valerie Griffiths  Sent: 3/24/2025  10:56 AM CDT  To: Sun Finley MD  Subject: Clearance                                        Good morning,We received a stat order for this patient to get a Transforaminal SUSANNA with Dr Marion. Dr Marion did see he just had a fistulotomy with you on Friday and has a follow with you on 4/14. We will need clearance from you to when this patient can get the injection done.Thanks,Valerie

## 2025-03-26 NOTE — DISCHARGE INSTRUCTIONS
[facility] [place]  Dr. Johan Marion  Messaging service # [phone]    POST-PROCEDURE INSTRUCTIONS:    Today you had an injection that included a steroid medications.  The steroid may or may not have been mixed with a local anesthetic when it was injected.   If the injection was in the neck, you may feel some pressure, numbness, or slight weakness in the arm after the procedure for a short period of time (this is a normal response), if this persists for longer than 1 day please contact our office or go to the emergency room.  If the injection was in the low back, you may feel some pressure, numbness, or slight weakness in the leg after the procedure for a short period of time (this is a normal response), if this persists for longer than 1 day please contact our office or go to the emergency room.  You may get side effects from the steroid.  This is not uncommon.  Symptoms include: elevated blood sugar, elevated blood pressure, headache, flushing, nausea, insomnia.  These symptoms are transient and will resolve within 1-3 days.  If symptoms last longer than this please contact our office or head to the emergency room.  Steroid medications can take anywhere from 3-14 days to take effect (rarely longer).  You may notice that your pain worsens for a short period of time after the injection, this would not be unusual due to the pressure and trauma from the needle.    If you do not have a follow up appointment scheduled, please contact my office (or the office of the physician who referred you for the procedure) to get a post-procedure follow up scheduled 2-4 weeks after the procedure.  This can be done as a virtual visit if that is more convenient for you.      What you need to do:    Keep a record of your response to the injection you had today.    How much relief did you get?   When did the relief start and how long did it last?  Were you able to decrease the use of any of your pain  medications?  Were you able to increase your level of activity?  How long did the relief last?    What to watch out for:    If you experience any of the following symptoms after your procedure, please notify the messaging service immediately (see above for contact information):   fever (increased oral temperature)   bleeding or swelling at the injection site,    drainage, rash or redness at the injection site    possible signs of infection    increased pain at the injection site   worsening of your usual pain   severe headache   new or worsening numbness    new arm and/or leg weakness, or    changes in bowel and/or bladder function: urinating or defecating on yourself and not knowing that you did it.    PLEASE FOLLOW ALL INSTRUCTIONS CAREFULLY     Do not engage in strenuous activity (e.g., lifting or pushing heavy objects or repeated bending) for 24 hours.     Do not take a bath, swim or use Jacuzzi for 24 hours after procedure. (A shower is fine).   Remove any Band-Aids when you get home.    Use cold/ice, as needed for comfort.  We recommend the use of cold therapy alternating on for 20 minutes, off for 20 minutes.    Do not apply direct heat (heating pad or heat packs) to the injection site for 24 hours.     Resume your usual medications, unless instructed otherwise by your Pain Physician.     If you are on warfarin (Coumadin) or other blood thinner, resume this medication as instructed by your prescribing Physician.    IF AT ANY POINT YOU ARE VERY CONCERNED ABOUT YOUR SYMPTOMS, or you have an urgent or emergent issue after between 5pm and 7am or on weekends, please contact  the on call provider at [phone].    If you develop worsening pain, weakness, numbness, lose bowel or bladder control (i.e., having an accident where you did not even know you had to go to the bathroom and suddenly noticed you soiled yourself), saddle anesthesia (a loss of sensation restricted to the area of the buttocks, anus and between  the legs -- i.e., those parts of your body that would touch a saddle if you were sitting on one) you need to go immediately to the emergency department for evaluation and treatment.    ----------------------------------------------------------------------------------------------------------------------------------------------------------------  If you received Sedation please read the following instructions:  POST SEDATION INSTRUCTIONS    Today you received intravenous medication (also known as sedation) that was used to help you relax and/or decrease discomfort during your procedure. This medication will be acting in your body for the next 24 hours, so you might feel a little tired or sleepy. This feeling will slowly wear off.   Common side effects associated with these medications include: drowsiness, dizziness, sleepiness, confusion, feeling excited, difficulty remembering things, lack of steadiness with walking or balance, loss of fine muscle control, slowed reflexes, difficulty focusing, and blurred vision.  Some over-the-counter and prescription medications (e.g., muscle relaxants, opioids, mood-altering medications, sedatives/hypnotics, antihistamines) can interact with the intravenous medication you received and cause an increased risk of the side effects listed above in addition to other potentially life threatening side effects. Use extreme caution if you are taking such medications, and consult with your Pain Physician or prescribing physician if you have any questions.  For the next 12-24 hours:    DO NOT--Drive a car, operate machinery or power tools   DO NOT--Drink any alcoholic beverages (not even beer), they may dangerously increase the risk of side effects.    DO NOT--Make any important legal or business decisions or sign important documents.  We advise you to have someone to assist you at home. Move slowly and carefully. Do not make sudden changes in position. Be aware of dizziness or  light-headedness and move accordingly.   If you seek medical treatment within 24 hours, let the nurse or doctor caring for you know that you have received the above medications. If you have any questions or concerns related to your sedation or treatment today please contact us.

## 2025-03-27 ENCOUNTER — PATIENT MESSAGE (OUTPATIENT)
Dept: NEUROLOGY | Facility: CLINIC | Age: 35
End: 2025-03-27
Payer: COMMERCIAL

## 2025-03-27 ENCOUNTER — HOSPITAL ENCOUNTER (OUTPATIENT)
Facility: HOSPITAL | Age: 35
Discharge: HOME | End: 2025-03-27
Attending: EMERGENCY MEDICINE | Admitting: EMERGENCY MEDICINE
Payer: COMMERCIAL

## 2025-03-27 ENCOUNTER — PATIENT MESSAGE (OUTPATIENT)
Dept: ORTHOPEDICS | Facility: CLINIC | Age: 35
End: 2025-03-27
Payer: COMMERCIAL

## 2025-03-27 VITALS
WEIGHT: 315 LBS | HEART RATE: 73 BPM | TEMPERATURE: 98 F | RESPIRATION RATE: 16 BRPM | OXYGEN SATURATION: 95 % | DIASTOLIC BLOOD PRESSURE: 86 MMHG | SYSTOLIC BLOOD PRESSURE: 140 MMHG | HEIGHT: 75 IN | BODY MASS INDEX: 39.17 KG/M2

## 2025-03-27 DIAGNOSIS — G89.29 CHRONIC PAIN: ICD-10-CM

## 2025-03-27 DIAGNOSIS — Z98.890 S/P LUMBAR DISCECTOMY: Primary | ICD-10-CM

## 2025-03-27 DIAGNOSIS — M54.16 LUMBAR RADICULOPATHY, CHRONIC: ICD-10-CM

## 2025-03-27 PROCEDURE — 64483 NJX AA&/STRD TFRM EPI L/S 1: CPT | Mod: LT,,, | Performed by: EMERGENCY MEDICINE

## 2025-03-27 PROCEDURE — 63600175 PHARM REV CODE 636 W HCPCS: Performed by: EMERGENCY MEDICINE

## 2025-03-27 PROCEDURE — 64483 NJX AA&/STRD TFRM EPI L/S 1: CPT | Mod: LT | Performed by: EMERGENCY MEDICINE

## 2025-03-27 PROCEDURE — 25500020 PHARM REV CODE 255: Performed by: EMERGENCY MEDICINE

## 2025-03-27 RX ORDER — LIDOCAINE HYDROCHLORIDE 20 MG/ML
INJECTION, SOLUTION EPIDURAL; INFILTRATION; INTRACAUDAL; PERINEURAL
Status: DISCONTINUED | OUTPATIENT
Start: 2025-03-27 | End: 2025-03-27 | Stop reason: HOSPADM

## 2025-03-27 RX ORDER — FENTANYL CITRATE 50 UG/ML
INJECTION, SOLUTION INTRAMUSCULAR; INTRAVENOUS
Status: DISCONTINUED | OUTPATIENT
Start: 2025-03-27 | End: 2025-03-27 | Stop reason: HOSPADM

## 2025-03-27 RX ORDER — MIDAZOLAM HYDROCHLORIDE 1 MG/ML
INJECTION INTRAMUSCULAR; INTRAVENOUS
Status: DISCONTINUED | OUTPATIENT
Start: 2025-03-27 | End: 2025-03-27 | Stop reason: HOSPADM

## 2025-03-27 RX ORDER — OXYCODONE HYDROCHLORIDE 5 MG/1
5 TABLET ORAL EVERY 8 HOURS PRN
Qty: 21 TABLET | Refills: 0 | Status: SHIPPED | OUTPATIENT
Start: 2025-03-27

## 2025-03-27 RX ORDER — LIDOCAINE HYDROCHLORIDE 10 MG/ML
INJECTION, SOLUTION EPIDURAL; INFILTRATION; INTRACAUDAL; PERINEURAL
Status: DISCONTINUED | OUTPATIENT
Start: 2025-03-27 | End: 2025-03-27 | Stop reason: HOSPADM

## 2025-03-27 RX ORDER — DEXAMETHASONE SODIUM PHOSPHATE 10 MG/ML
INJECTION, SOLUTION INTRA-ARTICULAR; INTRALESIONAL; INTRAMUSCULAR; INTRAVENOUS; SOFT TISSUE
Status: DISCONTINUED | OUTPATIENT
Start: 2025-03-27 | End: 2025-03-27 | Stop reason: HOSPADM

## 2025-03-27 NOTE — PROGRESS NOTES
Date of Surgery - 6/3/25  Patient class - Inpatient  Provider - Raleigh Bond  Procedure requested - Transforaminal Lumbar Interbody Fusion (TLIF)   Levels: L4-5  Plan of Care: 1 MIDNIGHT  Instrumentation - Globus excelsius (TLIF)  Medical Necessity - not urgent  CPT codes: 80987, 06191, 11822, and 19324  post procedural disposition - med/surg  estimated length of stay - 1 midnight    Patient first seen:  2/27/23  Patient's most recent visit: 3/20/25  Patient imaging: xrays, MRI.  Results: recurrent disc extrusion at L4-5   Treatment failed: the patient has tried and failed conservative treatment including medications, physical therapy and ESIs.    Medications tried: gabapentin, narcotics, and other: baclofen  Activity Modification: decreased activity level  Pt is sp L4-5 diskectomy on 9/30/24 with recurrent disc herniation. Had a left L4-5 TFESI today. Will plan for L4-5 TLIF to address problem  Functional impairment of ADLs: The patient's severe pain is affecting their quality of life and limiting their daily life, including working and ability to provide self care.       Pertinent physical exam findings: lower extremity weakness, paresthesias or numbness in the lower extremities, and positive straight leg raise    *please upload patient clinicals including all notes from Betty Vazquez PA-C, Lupe Rodriguez PA-C, M. Sunshine Ugalde NP, Apolinar Beltran MD, and/or Raleigh Bond MD.  Please also include any and all physical therapy and pain management notes.

## 2025-03-27 NOTE — PLAN OF CARE
Scott Gallagher has met all discharge criteria from Phase II. Vital Signs are stable, ambulating  without difficulty. Discharge instructions given, patient verbalized understanding. Discharged from facility via wheelchair in stable condition.       None known

## 2025-03-27 NOTE — INTERVAL H&P NOTE
The patient has been examined and the H&P has been reviewed:    I concur with the findings and changes have been noted since the H&P was written: patient had fistulotomy 3/21/25. Patient is afebrile, no purulent discharge from the wound and is otherwise at his baseline health. Discussed with Dr. Finley who cleared patient for procedure. Patient understands the risks of the procedure including epidural abscess and its sequale which includes but is not limited to paralysis, permanent pain and disability. He has acknowledged this and wishes to proceed     Anesthesia risks, benefits and alternative options discussed and understood by patient/family.          There are no hospital problems to display for this patient.

## 2025-03-27 NOTE — DISCHARGE SUMMARY
Discharge Note  Short Stay      SUMMARY     Admit Date: 3/27/2025    Attending Physician: Johan Marion      Discharge Physician: Johan Marion      Discharge Date: 3/27/2025 1:31 PM    Procedure(s) (LRB):  TFESI LT L4-5 (Left)    Final Diagnosis: S/P lumbar discectomy [Z98.890]  Lumbar radiculopathy, chronic [M54.16]    Disposition: Home or self care    Patient Instructions:   Current Discharge Medication List        CONTINUE these medications which have NOT CHANGED    Details   baclofen (LIORESAL) 10 MG tablet Take 1 tablet (10 mg total) by mouth 3 (three) times daily.  Qty: 90 tablet, Refills: 0      gabapentin (NEURONTIN) 300 MG capsule Take 1-2 capsules (300-600 mg total) by mouth 3 (three) times daily.  Qty: 180 capsule, Refills: 0      !! olmesartan-amLODIPin-hcthiazid 40-10-25 mg Tab Take by mouth.      oxyCODONE (ROXICODONE) 5 MG immediate release tablet Take 1 tablet (5 mg total) by mouth every 4 (four) hours as needed for Pain.  Qty: 20 tablet, Refills: 0    Comments: Quantity prescribed more than 7 day supply? No  Associated Diagnoses: Fistula-in-ano      acetaminophen (TYLENOL) 500 MG tablet Take 1 tablet (500 mg total) by mouth 3 (three) times daily.  Qty: 90 tablet, Refills: 0    Comments: [facility] 9/30/24      ALPRAZolam (XANAX) 0.5 MG tablet Take 1 tablet (0.5 mg total) by mouth daily as needed for Anxiety. 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      emtricitabine-tenofovir 200-300 mg (TRUVADA) 200-300 mg Tab Take 1 tablet by mouth once daily.  Qty: 30 tablet, Refills: 2    Associated Diagnoses: Encounter for HIV pre-exposure prophylaxis      hydrOXYzine pamoate (VISTARIL) 25 MG Cap Take 1 capsule (25 mg total) by mouth 3 (three) times daily as needed (as needed for anxiety).  Qty: 90 capsule, Refills: 2      !!  olmesartan-amLODIPin-hcthiazid 40-10-25 mg Tab Take 1 tablet by mouth once daily.  Qty: 90 tablet, Refills: 1    Associated Diagnoses: Hypertension, essential      propranoloL (INDERAL) 10 MG tablet Take 10 mg by mouth 3 (three) times daily as needed.       !! - Potential duplicate medications found. Please discuss with provider.              Discharge Diagnosis: S/P lumbar discectomy [Z98.890]  Lumbar radiculopathy, chronic [M54.16]  Condition on Discharge: Stable with no complications to procedure   Diet on Discharge: Same as before.  Activity: as per instruction sheet.  Discharge to: Home with a responsible adult.  Follow up: 2-4 weeks       Please call my office or pager at [phone] if experienced any weakness or loss of sensation, fever > 101.5, pain uncontrolled with oral medications, persistent nausea/vomiting/or diarrhea, redness or drainage from the incisions, or any other worrisome concerns. If physician on call was not reached or could not communicate with our office for any reason please go to the nearest emergency department

## 2025-03-27 NOTE — OP NOTE
Lumbar Transforaminal Epidural Steroid Injection under Fluoroscopic Guidance    The procedure, risks, benefits, and options were discussed with the patient. There are no contraindications to the procedure. The patent expressed understanding and agreed to the procedure. Informed written consent was obtained prior to the start of the procedure and can be found in the patient's chart.    PATIENT NAME: Scott Gallagher   MRN: [id number]     DATE OF PROCEDURE: 03/27/2025    PROCEDURE:  Left  L4/5 Lumbar Transforaminal Epidural Steroid Injection under Fluoroscopic Guidance    PRE-OP DIAGNOSIS: S/P lumbar discectomy [Z98.890]  Lumbar radiculopathy, chronic [M54.16] Lumbar radiculopathy [M54.16]    POST-OP DIAGNOSIS: Same    PHYSICIAN: Johan Marion MD    MEDICATIONS INJECTED: Preservative-free Decadron 10mg with 5cc of Lidocaine 1% MPF     LOCAL ANESTHETIC INJECTED: Xylocaine 2%     SEDATION: Versed 2mg and Fentanyl 50mcg                                                                                                                                                                                     Conscious sedation ordered by M.D. Patient re-evaluation prior to administration of conscious sedation. No changes noted in patient's status from initial evaluation. The patient's vital signs were monitored by RN and patient remained hemodynamically stable throughout the procedure.    Event Time In   Sedation Start 1314   Sedation End 1322       ESTIMATED BLOOD LOSS: None    COMPLICATIONS: None    TECHNIQUE: Time-out was performed to identify the patient and procedure to be performed. With the patient laying in a prone position, the surgical area was prepped and draped in the usual sterile fashion using ChloraPrep and a fenestrated drape.The levels were determined under fluoroscopy guidance. Skin anesthesia was achieved by injecting Lidocaine 2% over the injection sites. The transforaminal spaces were then approached with a 22  gauge, 5 inch spinal quinke needle that was introduced under fluoroscopic guidance in the AP and Lateral views. Once the needle tip was in the area of the transforaminal space, and there was no blood, CSF or paraesthesias, contrast dye Omnipaque (300mg/mL) was injected to confirm placement and there was no vascular runoff. Fluoroscopic imaging in the AP and lateral views revealed a clear outline of the spinal nerve with proximal spread of agent through the neural foramen into the epidural space. 3 mL of the medication mixture listed above was injected slowly at each site. Displacement of the radio opaque contrast after injection of the medication confirmed that the medication went into the area of the transforaminal spaces. The needles were removed and bleeding was nil. A sterile dressing was applied. No specimens collected. The patient tolerated the procedure well.     The patient was monitored after the procedure in the recovery area. They were given post-procedure and discharge instructions to follow at home. The patient was discharged in a stable condition.      Johan Marion MD

## 2025-03-28 ENCOUNTER — TELEPHONE (OUTPATIENT)
Dept: PSYCHIATRY | Facility: CLINIC | Age: 35
End: 2025-03-28
Payer: COMMERCIAL

## 2025-03-28 RX ORDER — LORAZEPAM 1 MG/1
1 TABLET ORAL DAILY PRN
Qty: 5 TABLET | Refills: 0 | Status: SHIPPED | OUTPATIENT
Start: 2025-03-28 | End: 2025-04-27

## 2025-03-28 RX ORDER — ALPRAZOLAM 0.5 MG/1
0.5 TABLET ORAL DAILY PRN
Qty: 30 TABLET | Refills: 0 | Status: SHIPPED | OUTPATIENT
Start: 2025-03-28

## 2025-03-28 NOTE — TELEPHONE ENCOUNTER
Sunitha with [facility] called to report a drug interaction between ALPRAZolam (XANAX), LORazepam (ATIVAN), oxyCODONE (ROXICODONE) & baclofen (LIORESAL). Would like to know if the Alprazolam & Lorazepam is okay to fill. The prescriptions were sent in by Dr. Mckeon.     Consulted with Dr Swain concerning the above. Chart reviewed. Pharmacy notified it was ok to proceed with refill.

## 2025-03-28 NOTE — TELEPHONE ENCOUNTER
Can reach out again to see about setting up the meeting. Worst case scenario should definitely be able to get it done in April, just a question of when.     Regarding the benzodiazepines, definitely okay for a trial of the Ativan, but its going to be a short course (probably 3-5 tablets) since we have the Xanax on board already and the idea would be to decide which of them would be more helpful. But I can shoot that over.

## 2025-04-14 ENCOUNTER — TELEPHONE (OUTPATIENT)
Dept: PAIN MEDICINE | Facility: CLINIC | Age: 35
End: 2025-04-14

## 2025-04-14 ENCOUNTER — OFFICE VISIT (OUTPATIENT)
Dept: SURGERY | Facility: CLINIC | Age: 35
End: 2025-04-14
Payer: COMMERCIAL

## 2025-04-14 ENCOUNTER — OFFICE VISIT (OUTPATIENT)
Dept: PAIN MEDICINE | Facility: CLINIC | Age: 35
End: 2025-04-14
Payer: COMMERCIAL

## 2025-04-14 VITALS
OXYGEN SATURATION: 95 % | HEIGHT: 75 IN | SYSTOLIC BLOOD PRESSURE: 129 MMHG | HEART RATE: 94 BPM | WEIGHT: 302.5 LBS | BODY MASS INDEX: 37.61 KG/M2 | DIASTOLIC BLOOD PRESSURE: 77 MMHG

## 2025-04-14 VITALS
BODY MASS INDEX: 37.94 KG/M2 | HEART RATE: 81 BPM | SYSTOLIC BLOOD PRESSURE: 125 MMHG | DIASTOLIC BLOOD PRESSURE: 86 MMHG | WEIGHT: 305.13 LBS | HEIGHT: 75 IN

## 2025-04-14 DIAGNOSIS — M54.16 LUMBAR RADICULOPATHY, CHRONIC: Primary | ICD-10-CM

## 2025-04-14 DIAGNOSIS — K60.30 FISTULA-IN-ANO: Primary | ICD-10-CM

## 2025-04-14 PROCEDURE — 3074F SYST BP LT 130 MM HG: CPT | Mod: CPTII,S$GLB,, | Performed by: EMERGENCY MEDICINE

## 2025-04-14 PROCEDURE — 3074F SYST BP LT 130 MM HG: CPT | Mod: CPTII,S$GLB,, | Performed by: SURGERY

## 2025-04-14 PROCEDURE — 99999 PR PBB SHADOW E&M-EST. PATIENT-LVL IV: CPT | Mod: PBBFAC,,, | Performed by: SURGERY

## 2025-04-14 PROCEDURE — 99999 PR PBB SHADOW E&M-EST. PATIENT-LVL III: CPT | Mod: PBBFAC,,, | Performed by: EMERGENCY MEDICINE

## 2025-04-14 PROCEDURE — 99024 POSTOP FOLLOW-UP VISIT: CPT | Mod: S$GLB,,, | Performed by: SURGERY

## 2025-04-14 PROCEDURE — 99204 OFFICE O/P NEW MOD 45 MIN: CPT | Mod: S$GLB,,, | Performed by: EMERGENCY MEDICINE

## 2025-04-14 PROCEDURE — 1159F MED LIST DOCD IN RCRD: CPT | Mod: CPTII,S$GLB,, | Performed by: SURGERY

## 2025-04-14 PROCEDURE — 3078F DIAST BP <80 MM HG: CPT | Mod: CPTII,S$GLB,, | Performed by: SURGERY

## 2025-04-14 PROCEDURE — 3008F BODY MASS INDEX DOCD: CPT | Mod: CPTII,S$GLB,, | Performed by: EMERGENCY MEDICINE

## 2025-04-14 PROCEDURE — 3079F DIAST BP 80-89 MM HG: CPT | Mod: CPTII,S$GLB,, | Performed by: EMERGENCY MEDICINE

## 2025-04-14 RX ORDER — DULOXETIN HYDROCHLORIDE 30 MG/1
CAPSULE, DELAYED RELEASE ORAL
Qty: 46 CAPSULE | Refills: 0 | Status: SHIPPED | OUTPATIENT
Start: 2025-04-14 | End: 2025-05-14

## 2025-04-14 RX ORDER — GABAPENTIN 600 MG/1
TABLET ORAL
Qty: 125 TABLET | Refills: 0 | Status: SHIPPED | OUTPATIENT
Start: 2025-04-14 | End: 2025-05-14

## 2025-04-14 NOTE — PROGRESS NOTES
..Interventional Pain Management - New Patient Visit    Chief Complaint   Patient presents with    Low-back Pain        Original HPI 04/14/2025: Scott Gallagher  presents to the clinic for the evaluation of the above pain. The pain started 2/2021 . Original Pain Description: The pain is located in the lower back and is radiating to the left leg . The pain is described as aching, dull, stabbing, and fiery . Exacerbating factors: Sitting, Standing, Laying, and Walking. Mitigating factors heat, ice, and medications. Symptoms interfere with daily activity. The patient feels like symptoms have been unchanged. Patient denies loss of sensations.Denies perineal paresthesias, bowel/bladder dysfunction. He is able to get in and out of bed better than before, but the pain remains. Pain is 30% back and 70% leg.     PAIN SCORES:  Best: Pain is 8  Current: Pain is 8  Worst: Pain is 10    6 weeks of Conservative therapy:  PT: Completed  Chiro:  HEP: Unable due to pain    Treatments / Medications:   Xanax 0.5 mg  Baclofen 10 mg  Gabapentin 900 mg in am   Oxycodone 5 mg t.i.d. - no longer taking  Tylenol 500 mg    Antiplatelets/Anticoagulants/Immunosuppressants:  Truvada    Interventional Pain Procedures:   03/27/25 L4/5 left TFESI  09/2024 L4/5 Lammi    IMAGING:    MRI LUMBAR SPINE WITHOUT CONTRAST   03/21/2025  L3-L4: Central protrusion effaces VTS. AF  L4-L5: Op level. Left extrusion->severe effacement left LR and compression of  descending L5NR. Mild left foraminal narrowing. Mild disorganized edema signal about the operative site, without focal organized collection.  L5-S1: Central protrusion, AF and mild facet arthropathy->mild effacement of LR recesses & mild left neural foraminal narrowing.    Past Surgical History:   Procedure Laterality Date    APPENDECTOMY  2004    COLONOSCOPY N/A 9/5/2024    Procedure: COLONOSCOPY;  Surgeon: Rahul Valentine MD;  Location: [facility] ([address] [place] [address]);  Service: Endoscopy;  Laterality: N/A;     DIGITAL RECTAL EXAMINATION UNDER ANESTHESIA N/A 3/21/2025    Procedure: Exam under anesthesia, digital rectum fistulotomy;  Surgeon: Sun Finley MD;  Location: [facility] OR;  Service: Colon and Rectal;  Laterality: N/A;    EPIDURAL STEROID INJECTION N/A 7/21/2023    Procedure: L4-5 LESI (toward the left);  Surgeon: Dom Jha DO;  Location: [facility]IN [facility];  Service: Pain Management;  Laterality: N/A;  oral sedation    EPIDURAL STEROID INJECTION INTO LUMBAR SPINE N/A 4/23/2024    Procedure: L4-5 Lumbar SUSANNA (toward the left - long needle);  Surgeon: Dom Jha DO;  Location: [facility];  Service: Pain Management;  Laterality: N/A;  15mins-Oral Sed    ESOPHAGOGASTRODUODENOSCOPY N/A 9/5/2024    Procedure: EGD (ESOPHAGOGASTRODUODENOSCOPY);  Surgeon: Rahul Valentine MD;  Location: [facility] ([address] [place] [address]);  Service: Endoscopy;  Laterality: N/A;  Referred by: Dr. 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, golytely, instructions to portal-Kpvt  8/28 pre call attempted, no answer-LVM  9/4-pt cannot come in earlier than 12:15pm, precall complete-Kpvt    FISTULOTOMY  3/21/2025    Procedure: FISTULOTOMY, ANAL;  Surgeon: Sun Finley MD;  Location: [facility] OR;  Service: Colon and Rectal;;    INJECTION, SPINE, LUMBOSACRAL, TRANSFORAMINAL APPROACH Left 3/27/2025    Procedure: TFESI LT L4-5;  Surgeon: Johan Marion MD;  Location: [facility];  Service: Pain Management;  Laterality: Left;  no ac    LUMBAR LAMINECTOMY WITH DISCECTOMY N/A 9/30/2024    Procedure: LAMINECTOMY, SPINE, LUMBAR, WITH DISCECTOMY L4-5 (L) TREVOR RETRACTORS SNS: SSEP/EMG;  Surgeon: Raleigh Bond MD;  Location: [facility];  Service: Orthopedics;  Laterality: N/A;    WISDOM TOOTH EXTRACTION  2011       Social History     Socioeconomic History    Marital status: Single   Occupational History    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   Tobacco Use    Smoking status: Never    Smokeless tobacco: Never  "  Substance and Sexual Activity    Alcohol use: Yes     Comment: socially/occasionally    Drug use: Yes     Types: Marijuana     Comment: very rarely used, edible    Sexual activity: Yes     Partners: Male     Comment: boyfriend     Social Drivers of Health     Financial Resource Strain: Medium Risk (3/26/2024)    Overall Financial Resource Strain (CARDIA)     Difficulty of Paying Living Expenses: Somewhat hard   Food Insecurity: Food Insecurity Present (3/26/2024)    Hunger Vital Sign     Worried About Running Out of Food in the Last Year: Sometimes true     Ran Out of Food in the Last Year: Never true   Transportation Needs: No Transportation Needs (3/26/2024)    PRAPARE - Transportation     Lack of Transportation (Medical): No     Lack of Transportation (Non-Medical): No   Physical Activity: Unknown (3/26/2024)    Exercise Vital Sign     Days of Exercise per Week: Patient declined   Stress: Stress Concern Present (3/26/2024)    [place] citizen [place] [employer] - Occupational Stress Questionnaire     Feeling of Stress : To some extent   Housing Stability: Low Risk  (3/26/2024)    Housing Stability Vital Sign     Unable to Pay for Housing in the Last Year: No     Number of Places Lived in the Last Year: 1     Unstable Housing in the Last Year: No       Medications/Allergies: See med card    ROS:  GENERAL: No fever. No chills. No fatigue. Denies weight loss. Denies weight gain.  Back / musculoskeletal / neuro : See HPI    VITALS:   Vitals:    04/14/25 1119   BP: 125/86   Pulse: 81   Weight: (!) 138.4 kg (305 lb 1.9 oz)   Height: 6' 3" (1.905 m)   PainSc:   8   PainLoc: Back     Body mass index is 38.14 kg/m².      4/14/2025    11:20 AM   Last 3 PDI Scores   Pain Disability Index (PDI) 56       PHYSICAL EXAM:   GENERAL: Well appearing, in no acute distress, alert and oriented x3.  PSYCH:  Mood and affect appropriate.  SKIN: Skin color, texture, turgor normal, no rashes or lesions.  HEENT:  Normocephalic, " atraumatic. Cranial nerves grossly intact.  NECK: No pain to palpation over the cervical paraspinous muscles. No pain to palpation over facets. No pain with neck flexion, extension, or lateral flexion.   PULM: No evidence of respiratory difficulty, symmetric chest rise.  GI:  Non-distended  BACK: Normal range of motion. No pain to palpation over the spinous processes. No pain to palpation over facet joints. There is no pain with palpation over the sacroiliac joints bilaterally.   EXTREMITIES: No deformities, edema, or skin discoloration.   MUSCULOSKELETAL: Shoulder, hip, and knee provocative maneuvers are negative. No atrophy is noted.  NEURO: Sensation is equal and appropriate bilaterally. Bilateral upper and lower extremity strength is normal and symmetric. Bilateral upper and lower extremity coordination and muscle stretch reflexes are physiologic and symmetric. Plantar response are downgoing. Straight leg raising in the supine position is positive to radicular pain on the left.   GAIT: normal.      LABS:    Lab Results   Component Value Date    HGBA1C 5.5 02/17/2023       Lab Results   Component Value Date    CREATININE 0.9 09/18/2024         ASSESSMENT: 34 y.o. year old male with pain, consistent with:    Encounter Diagnosis   Name Primary?    Lumbar radiculopathy, chronic Yes       DISCUSSION: Scott Gallagher is a patient with Hx of CVA who comes to us with chronic lower back pain and radiculopathy with pending surgery in June.     PLAN:  - I have stressed the importance of physical activity and a home exercise plan to help with pain and improve health.  - Patient can continue with medications for now since they are providing benefits, using them appropriately, and without side effects.  - Counseled patient regarding the importance of activity modification and constant sleeping habits.  - Medications:Start Gabapentin 300mg qHS and gradually increasing to TID if tolerating. and Start Cymbalta 30mg gradually to  twice a day    - Interventions: Schedule for a Left  L4/5 and L5/S1 Transforaminal epidural steroid injection  to help with their pain and progress with a home exercise plan.. Explained the risks and benefits of the procedure in detail with the patient today in clinic along with alternative treatment options, and the patient elected to pursue the intervention at this time.   - Will get clearance from Dr. Bond    - Imaging: Reviewed available imaging with patient and answered any questions they had regarding study.  - The patient's pathophysiology was explained in detail with reference to x-rays, models, other visual aids as appropriate.   - Follow up visit: 4-6 weeks after procedure      Johan Marion MD  04/14/2025

## 2025-04-14 NOTE — TELEPHONE ENCOUNTER
----- Message from Johan Marion MD sent at 2025  2:25 PM CDT -----  Regarding: Order for LIZ MEZA    Patient Name: LIZ MEZA([id number])  Sex: Male  : 1990      PCP: LETA KAYE    Center: [facility]     Level of Service:29017     VT OFFICE/OUTPT VISIT, NEW, LEVL IV, 45-59 MIN    Types of orders made on 2025: Medications, Procedure Request    Order Date:2025  Ordering User:JOHAN MARION [[id number]]  Encounter Provider:Johan Marion MD [[id number]]  Authorizing Provider: Johan Marion MD [[id number]]  Department:[facility] PAIN MANAGEMENT[[id number]]    Common Order Information  Procedure -> Transforaminal Injection (Specify level and laterality) Cmt: Left             L4/5 and L5/S1    Order Specific Information  Order: Procedure Request Order for Pain Management [Custom: [id number]]  Order #:          [id number]: 1 FUTURE    Priority: Routine  Class: Clinic Performed    Future Order Information      Expires on:2026            Expected by:2025                   Associated Diagnoses      M54.16 Lumbar radiculopathy, chronic      Physician -> Doni         Facility Name: -> [facility]           Priority: Routine  Class: Clinic Performed    Future Order Information      Expires on:2026            Expected by:2025                   Associated Diagnoses      M54.16 Lumbar radiculopathy, chronic      Procedure -> Transforaminal Injection (Specify level and laterality) Cmt:                 Left L4/5 and L5/S1        Physician -> Doni         Facility Name: -> [facility]

## 2025-04-14 NOTE — TELEPHONE ENCOUNTER
----- Message from Johan Marion MD sent at 2025  2:25 PM CDT -----  Regarding: Order for LIZ MEZA    Patient Name: LIZ MEZA([id number])  Sex: Male  : 1990      PCP: LETA KAYE    Center: [facility]     Level of Service:73341     CA OFFICE/OUTPT VISIT, NEW, LEVL IV, 45-59 MIN    Types of orders made on 2025: Medications, Procedure Request    Order Date:2025  Ordering User:JOHAN MARION [[id number]]  Encounter Provider:Johan Marion MD [[id number]]  Authorizing Provider: Johan Marion MD [[id number]]  Department:[facility] PAIN MANAGEMENT[[id number]]    Common Order Information  Procedure -> Transforaminal Injection (Specify level and laterality) Cmt: Left             L4/5 and L5/S1    Order Specific Information  Order: Procedure Request Order for Pain Management [Custom: [id number]]  Order #:          [id number]: 1 FUTURE    Priority: Routine  Class: Clinic Performed    Future Order Information      Expires on:2026            Expected by:2025                   Associated Diagnoses      M54.16 Lumbar radiculopathy, chronic      Physician -> Doni         Facility Name: -> [facility]           Priority: Routine  Class: Clinic Performed    Future Order Information      Expires on:2026            Expected by:2025                   Associated Diagnoses      M54.16 Lumbar radiculopathy, chronic      Procedure -> Transforaminal Injection (Specify level and laterality) Cmt:                 Left L4/5 and L5/S1        Physician -> Doni         Facility Name: -> [facility]

## 2025-04-14 NOTE — PROGRESS NOTES
Colon & Rectal Surgery Clinic Follow Up    HPI:   Scott Gallagher is a 34 y.o. male who presents for follow up of fistula in ano    Interval history  Having some drainage, using gauze.  Pain is tolerable.  Having back pain, surgery in June.     Objective:   Vitals:    04/14/25 1538   BP: 129/77   Pulse: 94        Physical Exam   Gen: well developed male, NAD  HEENT: normocephalic, atraumatic, PERRL, EOMI   CV: RRR, no murmurs  Resp: nonlabored, CTAB  Abd: soft, NTND   MSK: no gross deformities  Anorectal: healing fistulotomy site in the anterior midline with healthy granulation tissue, scant fibrinous exudate    Assessment and Plan:   Scott Gallagher  is a 34 y.o. male who presents for follow up of fistula in ano    - fistulotomy performed, site healing well  - follow up in 1 month       Sun Finley MD, FACS, FASCRS  Staff Surgeon   Colon & Rectal Surgery

## 2025-04-21 ENCOUNTER — TELEPHONE (OUTPATIENT)
Dept: PAIN MEDICINE | Facility: CLINIC | Age: 35
End: 2025-04-21
Payer: COMMERCIAL

## 2025-04-23 ENCOUNTER — OFFICE VISIT (OUTPATIENT)
Dept: ORTHOPEDICS | Facility: CLINIC | Age: 35
End: 2025-04-23
Payer: COMMERCIAL

## 2025-04-23 VITALS — BODY MASS INDEX: 37.24 KG/M2 | WEIGHT: 297.94 LBS

## 2025-04-23 DIAGNOSIS — M48.07 SPINAL STENOSIS, LUMBOSACRAL REGION: ICD-10-CM

## 2025-04-23 DIAGNOSIS — Z98.890 S/P LUMBAR DISCECTOMY: ICD-10-CM

## 2025-04-23 DIAGNOSIS — M54.16 LUMBAR RADICULOPATHY: ICD-10-CM

## 2025-04-23 DIAGNOSIS — M47.816 LUMBAR SPONDYLOSIS: Primary | ICD-10-CM

## 2025-04-23 DIAGNOSIS — M51.362 DEGENERATION OF INTERVERTEBRAL DISC OF LUMBAR REGION WITH DISCOGENIC BACK PAIN AND LOWER EXTREMITY PAIN: ICD-10-CM

## 2025-04-23 PROCEDURE — 99214 OFFICE O/P EST MOD 30 MIN: CPT | Mod: S$GLB,,, | Performed by: ORTHOPAEDIC SURGERY

## 2025-04-23 PROCEDURE — 3008F BODY MASS INDEX DOCD: CPT | Mod: CPTII,S$GLB,, | Performed by: ORTHOPAEDIC SURGERY

## 2025-04-23 PROCEDURE — 99999 PR PBB SHADOW E&M-EST. PATIENT-LVL III: CPT | Mod: PBBFAC,,, | Performed by: ORTHOPAEDIC SURGERY

## 2025-04-24 ENCOUNTER — TELEPHONE (OUTPATIENT)
Dept: PAIN MEDICINE | Facility: HOSPITAL | Age: 35
End: 2025-04-24
Payer: COMMERCIAL

## 2025-04-24 ENCOUNTER — OFFICE VISIT (OUTPATIENT)
Dept: INTERNAL MEDICINE | Facility: CLINIC | Age: 35
End: 2025-04-24
Attending: FAMILY MEDICINE
Payer: COMMERCIAL

## 2025-04-24 ENCOUNTER — PATIENT MESSAGE (OUTPATIENT)
Dept: INTERNAL MEDICINE | Facility: CLINIC | Age: 35
End: 2025-04-24

## 2025-04-24 VITALS
HEIGHT: 75 IN | BODY MASS INDEX: 37.01 KG/M2 | WEIGHT: 297.63 LBS | SYSTOLIC BLOOD PRESSURE: 137 MMHG | OXYGEN SATURATION: 95 % | HEART RATE: 110 BPM | DIASTOLIC BLOOD PRESSURE: 85 MMHG

## 2025-04-24 DIAGNOSIS — M51.26 LUMBAR DISC HERNIATION: Primary | ICD-10-CM

## 2025-04-24 PROCEDURE — 99499 UNLISTED E&M SERVICE: CPT | Mod: S$GLB,,, | Performed by: FAMILY MEDICINE

## 2025-04-24 PROCEDURE — 99999 PR PBB SHADOW E&M-EST. PATIENT-LVL IV: CPT | Mod: PBBFAC,,, | Performed by: FAMILY MEDICINE

## 2025-04-25 NOTE — PROGRESS NOTES
DATE: 4/25/2025  PATIENT: Scott Gallagher    Attending Physician: Raleigh Bond M.D.    9/30/24: L4-5 microdiscectomy    HISTORY:  Scott Gallagher is a 34 y.o. male w/ a hx of hemorrhagic stroke (May 2021) with 4 episodes of focal dystonia (left hand and speech when it occurs) who returns to me today for FU. Patient continues to have LBP that radiates down LLE. Patient has been taking meds and doing exercises without much relief. He is miserable and wants surgical intervention.    The patient does not smoke, have DM or endorse IVDU. The patient is not on any blood thinners and does not take chronic narcotics. 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    PMH/PSH/FamHx/SocHx:  Unchanged from prior visit    ROS:  Positive for LBP and LLE pain  Denies perineal paresthesias, bowel or bladder incontinence    EXAM:  Wt 135.2 kg (297 lb 15.2 oz)   BMI 37.24 kg/m²     My physical examination was notable for the following findings: motor intact BLE; SILT    IMAGING:  Today I independently reviewed the following images and my interpretations are as follows:    Previous L-spine XRs showed spondylosis and DDD.    Lumbar MRI showed L4-5 recurrent HNP with moderate stenosis. L4 laminectomy    ASSESSMENT/PLAN:  Patient has recurrent lumbar HNP with LLE radiculopathy, in the setting of prior L4-5 microdiscectomy. I discussed the natural history of their diagnoses as well as surgical and nonsurgical treatment options. I educated the patient on the importance of core/back strengthening, correct posture, bending/lifting ergonomics, and low-impact aerobic exercises (walking, elliptical, and aquatherapy). Continue medications. Patient has failed conservative management and is a candidate for L4-5 PLDF/TLIF (L). I ordered CT lumbar for preop planning.    I had a sit down discussion with the patient regarding the above surgery. We specifically discussed the risks, benefits, and alternatives to surgery.  We discussed the surgical procedure including the skin incision, nerve decompression, bone fusion, allograft, iliac crest bone graft, and surgical implants including pedicle screws and interbody devices as indicated: they understand the risks include but are not limited to death, paralysis, blindness, bleeding, infection, damage to arteries, veins and nerves, spinal fluid leak, continued or worsening pain, no improvement in symptoms, non-union, and the possible need for more surgery in the future, the possible need for perioperative blood transfusion, as well as the possibility other unforseen and unknown complications. We talked about expected hospital stay and recovery period. All questions were answered; they understand and wish to proceed.     Raleigh Bond MD  Orthopaedic Spine Surgeon  Department of Orthopaedic Surgery  [phone]

## 2025-04-25 NOTE — H&P (VIEW-ONLY)
DATE: 4/25/2025  PATIENT: Scott Gallagher    Attending Physician: Raleigh Bond M.D.    9/30/24: L4-5 microdiscectomy    HISTORY:  Scott Gallagher is a 34 y.o. male w/ a hx of hemorrhagic stroke (May 2021) with 4 episodes of focal dystonia (left hand and speech when it occurs) who returns to me today for FU. Patient continues to have LBP that radiates down LLE. Patient has been taking meds and doing exercises without much relief. He is miserable and wants surgical intervention.    The patient does not smoke, have DM or endorse IVDU. The patient is not on any blood thinners and does not take chronic narcotics. 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    PMH/PSH/FamHx/SocHx:  Unchanged from prior visit    ROS:  Positive for LBP and LLE pain  Denies perineal paresthesias, bowel or bladder incontinence    EXAM:  Wt 135.2 kg (297 lb 15.2 oz)   BMI 37.24 kg/m²     My physical examination was notable for the following findings: motor intact BLE; SILT    IMAGING:  Today I independently reviewed the following images and my interpretations are as follows:    Previous L-spine XRs showed spondylosis and DDD.    Lumbar MRI showed L4-5 recurrent HNP with moderate stenosis. L4 laminectomy    ASSESSMENT/PLAN:  Patient has recurrent lumbar HNP with LLE radiculopathy, in the setting of prior L4-5 microdiscectomy. I discussed the natural history of their diagnoses as well as surgical and nonsurgical treatment options. I educated the patient on the importance of core/back strengthening, correct posture, bending/lifting ergonomics, and low-impact aerobic exercises (walking, elliptical, and aquatherapy). Continue medications. Patient has failed conservative management and is a candidate for L4-5 PLDF/TLIF (L). I ordered CT lumbar for preop planning.    I had a sit down discussion with the patient regarding the above surgery. We specifically discussed the risks, benefits, and alternatives to surgery.  We discussed the surgical procedure including the skin incision, nerve decompression, bone fusion, allograft, iliac crest bone graft, and surgical implants including pedicle screws and interbody devices as indicated: they understand the risks include but are not limited to death, paralysis, blindness, bleeding, infection, damage to arteries, veins and nerves, spinal fluid leak, continued or worsening pain, no improvement in symptoms, non-union, and the possible need for more surgery in the future, the possible need for perioperative blood transfusion, as well as the possibility other unforseen and unknown complications. We talked about expected hospital stay and recovery period. All questions were answered; they understand and wish to proceed.     Raleigh Bond MD  Orthopaedic Spine Surgeon  Department of Orthopaedic Surgery  [phone]

## 2025-04-26 NOTE — DISCHARGE INSTRUCTIONS
[facility] [place]  Dr. Johan Marion  Messaging service # [phone]    POST-PROCEDURE INSTRUCTIONS:    Today you had an injection that included a steroid medications.  The steroid may or may not have been mixed with a local anesthetic when it was injected.   If the injection was in the neck, you may feel some pressure, numbness, or slight weakness in the arm after the procedure for a short period of time (this is a normal response), if this persists for longer than 1 day please contact our office or go to the emergency room.  If the injection was in the low back, you may feel some pressure, numbness, or slight weakness in the leg after the procedure for a short period of time (this is a normal response), if this persists for longer than 1 day please contact our office or go to the emergency room.  You may get side effects from the steroid.  This is not uncommon.  Symptoms include: elevated blood sugar, elevated blood pressure, headache, flushing, nausea, insomnia.  These symptoms are transient and will resolve within 1-3 days.  If symptoms last longer than this please contact our office or head to the emergency room.  Steroid medications can take anywhere from 3-14 days to take effect (rarely longer).  You may notice that your pain worsens for a short period of time after the injection, this would not be unusual due to the pressure and trauma from the needle.    If you do not have a follow up appointment scheduled, please contact my office (or the office of the physician who referred you for the procedure) to get a post-procedure follow up scheduled 2-4 weeks after the procedure.  This can be done as a virtual visit if that is more convenient for you.      What you need to do:    Keep a record of your response to the injection you had today.    How much relief did you get?   When did the relief start and how long did it last?  Were you able to decrease the use of any of your pain  medications?  Were you able to increase your level of activity?  How long did the relief last?    What to watch out for:    If you experience any of the following symptoms after your procedure, please notify the messaging service immediately (see above for contact information):   fever (increased oral temperature)   bleeding or swelling at the injection site,    drainage, rash or redness at the injection site    possible signs of infection    increased pain at the injection site   worsening of your usual pain   severe headache   new or worsening numbness    new arm and/or leg weakness, or    changes in bowel and/or bladder function: urinating or defecating on yourself and not knowing that you did it.    PLEASE FOLLOW ALL INSTRUCTIONS CAREFULLY     Do not engage in strenuous activity (e.g., lifting or pushing heavy objects or repeated bending) for 24 hours.     Do not take a bath, swim or use Jacuzzi for 24 hours after procedure. (A shower is fine).   Remove any Band-Aids when you get home.    Use cold/ice, as needed for comfort.  We recommend the use of cold therapy alternating on for 20 minutes, off for 20 minutes.    Do not apply direct heat (heating pad or heat packs) to the injection site for 24 hours.     Resume your usual medications, unless instructed otherwise by your Pain Physician.     If you are on warfarin (Coumadin) or other blood thinner, resume this medication as instructed by your prescribing Physician.    IF AT ANY POINT YOU ARE VERY CONCERNED ABOUT YOUR SYMPTOMS, or you have an urgent or emergent issue after between 5pm and 7am or on weekends, please contact  the on call provider at [phone].    If you develop worsening pain, weakness, numbness, lose bowel or bladder control (i.e., having an accident where you did not even know you had to go to the bathroom and suddenly noticed you soiled yourself), saddle anesthesia (a loss of sensation restricted to the area of the buttocks, anus and between  the legs -- i.e., those parts of your body that would touch a saddle if you were sitting on one) you need to go immediately to the emergency department for evaluation and treatment.    ----------------------------------------------------------------------------------------------------------------------------------------------------------------  If you received Sedation please read the following instructions:  POST SEDATION INSTRUCTIONS    Today you received intravenous medication (also known as sedation) that was used to help you relax and/or decrease discomfort during your procedure. This medication will be acting in your body for the next 24 hours, so you might feel a little tired or sleepy. This feeling will slowly wear off.   Common side effects associated with these medications include: drowsiness, dizziness, sleepiness, confusion, feeling excited, difficulty remembering things, lack of steadiness with walking or balance, loss of fine muscle control, slowed reflexes, difficulty focusing, and blurred vision.  Some over-the-counter and prescription medications (e.g., muscle relaxants, opioids, mood-altering medications, sedatives/hypnotics, antihistamines) can interact with the intravenous medication you received and cause an increased risk of the side effects listed above in addition to other potentially life threatening side effects. Use extreme caution if you are taking such medications, and consult with your Pain Physician or prescribing physician if you have any questions.  For the next 12-24 hours:    DO NOT--Drive a car, operate machinery or power tools   DO NOT--Drink any alcoholic beverages (not even beer), they may dangerously increase the risk of side effects.    DO NOT--Make any important legal or business decisions or sign important documents.  We advise you to have someone to assist you at home. Move slowly and carefully. Do not make sudden changes in position. Be aware of dizziness or  light-headedness and move accordingly.   If you seek medical treatment within 24 hours, let the nurse or doctor caring for you know that you have received the above medications. If you have any questions or concerns related to your sedation or treatment today please contact us.

## 2025-04-28 ENCOUNTER — HOSPITAL ENCOUNTER (OUTPATIENT)
Facility: HOSPITAL | Age: 35
Discharge: HOME | End: 2025-04-28
Attending: EMERGENCY MEDICINE | Admitting: EMERGENCY MEDICINE
Payer: COMMERCIAL

## 2025-04-28 VITALS
TEMPERATURE: 98 F | HEART RATE: 80 BPM | DIASTOLIC BLOOD PRESSURE: 91 MMHG | SYSTOLIC BLOOD PRESSURE: 135 MMHG | OXYGEN SATURATION: 95 % | RESPIRATION RATE: 9 BRPM

## 2025-04-28 DIAGNOSIS — G89.29 CHRONIC PAIN: ICD-10-CM

## 2025-04-28 PROCEDURE — 63600175 PHARM REV CODE 636 W HCPCS: Performed by: EMERGENCY MEDICINE

## 2025-04-28 PROCEDURE — 64484 NJX AA&/STRD TFRM EPI L/S EA: CPT | Mod: LT | Performed by: EMERGENCY MEDICINE

## 2025-04-28 PROCEDURE — 25500020 PHARM REV CODE 255: Performed by: EMERGENCY MEDICINE

## 2025-04-28 PROCEDURE — 99152 MOD SED SAME PHYS/QHP 5/>YRS: CPT | Performed by: EMERGENCY MEDICINE

## 2025-04-28 PROCEDURE — 64483 NJX AA&/STRD TFRM EPI L/S 1: CPT | Mod: LT | Performed by: EMERGENCY MEDICINE

## 2025-04-28 RX ORDER — FENTANYL CITRATE 50 UG/ML
INJECTION, SOLUTION INTRAMUSCULAR; INTRAVENOUS
Status: DISCONTINUED | OUTPATIENT
Start: 2025-04-28 | End: 2025-04-28 | Stop reason: HOSPADM

## 2025-04-28 RX ORDER — LIDOCAINE HYDROCHLORIDE 10 MG/ML
INJECTION, SOLUTION EPIDURAL; INFILTRATION; INTRACAUDAL; PERINEURAL
Status: DISCONTINUED | OUTPATIENT
Start: 2025-04-28 | End: 2025-04-28 | Stop reason: HOSPADM

## 2025-04-28 RX ORDER — DEXAMETHASONE SODIUM PHOSPHATE 10 MG/ML
INJECTION, SOLUTION INTRA-ARTICULAR; INTRALESIONAL; INTRAMUSCULAR; INTRAVENOUS; SOFT TISSUE
Status: DISCONTINUED | OUTPATIENT
Start: 2025-04-28 | End: 2025-04-28 | Stop reason: HOSPADM

## 2025-04-28 RX ORDER — LIDOCAINE HYDROCHLORIDE 20 MG/ML
INJECTION, SOLUTION EPIDURAL; INFILTRATION; INTRACAUDAL; PERINEURAL
Status: DISCONTINUED | OUTPATIENT
Start: 2025-04-28 | End: 2025-04-28 | Stop reason: HOSPADM

## 2025-04-28 RX ORDER — MIDAZOLAM HYDROCHLORIDE 1 MG/ML
INJECTION INTRAMUSCULAR; INTRAVENOUS
Status: DISCONTINUED | OUTPATIENT
Start: 2025-04-28 | End: 2025-04-28 | Stop reason: HOSPADM

## 2025-04-28 NOTE — DISCHARGE SUMMARY
Discharge Note  Short Stay      SUMMARY     Admit Date: 4/28/2025    Attending Physician: Johan Marion      Discharge Physician: Johan Marion      Discharge Date: 4/28/2025 11:59 AM    Procedure(s) (LRB):  Left L4/5 and L5/S1 TFESI (Left)    Final Diagnosis: Lumbar radiculopathy, chronic [M54.16]    Disposition: Home or self care    Patient Instructions:   Current Discharge Medication List        CONTINUE these medications which have NOT CHANGED    Details   acetaminophen (TYLENOL) 500 MG tablet Take 1 tablet (500 mg total) by mouth 3 (three) times daily.  Qty: 90 tablet, Refills: 0    Comments: [facility] 9/30/24      baclofen (LIORESAL) 10 MG tablet Take 1 tablet (10 mg total) by mouth 3 (three) times daily.  Qty: 90 tablet, Refills: 0      DULoxetine (CYMBALTA) 30 MG capsule Take 1 capsule (30 mg total) by mouth once daily for 14 days, THEN 2 capsules (60 mg total) once daily for 16 days.  Qty: 46 capsule, Refills: 0      gabapentin (NEURONTIN) 600 MG tablet Take 1.5 tablets (900 mg total) by mouth 2 (two) times daily for 7 days, THEN 1.5 tablets (900 mg total) 3 (three) times daily for 23 days.  Qty: 125 tablet, Refills: 0      !! olmesartan-amLODIPin-hcthiazid 40-10-25 mg Tab Take by mouth.      ALPRAZolam (XANAX) 0.5 MG tablet Take 1 tablet (0.5 mg total) by mouth daily as needed for Anxiety. 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      emtricitabine-tenofovir 200-300 mg (TRUVADA) 200-300 mg Tab Take 1 tablet by mouth once daily.  Qty: 30 tablet, Refills: 2    Associated Diagnoses: Encounter for HIV pre-exposure prophylaxis      hydrOXYzine pamoate (VISTARIL) 25 MG Cap Take 1 capsule (25 mg total) by mouth 3 (three) times daily as needed (as needed for anxiety).  Qty: 90 capsule, Refills: 2      LORazepam (ATIVAN) 1 MG  tablet Take 1 tablet (1 mg total) by mouth daily as needed for Anxiety.  Qty: 5 tablet, Refills: 0    Associated Diagnoses: Generalized anxiety disorder with panic attacks      !! olmesartan-amLODIPin-hcthiazid 40-10-25 mg Tab Take 1 tablet by mouth once daily.  Qty: 90 tablet, Refills: 1    Associated Diagnoses: Hypertension, essential      oxyCODONE (ROXICODONE) 5 MG immediate release tablet Take 1 tablet (5 mg total) by mouth every 8 (eight) hours as needed for Pain.  Qty: 21 tablet, Refills: 0    Comments: Quantity prescribed more than 7 day supply? No  Associated Diagnoses: S/P lumbar discectomy; Lumbar radiculopathy, chronic      propranoloL (INDERAL) 10 MG tablet Take 10 mg by mouth 3 (three) times daily as needed.       !! - Potential duplicate medications found. Please discuss with provider.              Discharge Diagnosis: Lumbar radiculopathy, chronic [M54.16]  Condition on Discharge: Stable with no complications to procedure   Diet on Discharge: Same as before.  Activity: as per instruction sheet.  Discharge to: Home with a responsible adult.  Follow up: 2-4 weeks       Please call my office or pager at [phone] if experienced any weakness or loss of sensation, fever > 101.5, pain uncontrolled with oral medications, persistent nausea/vomiting/or diarrhea, redness or drainage from the incisions, or any other worrisome concerns. If physician on call was not reached or could not communicate with our office for any reason please go to the nearest emergency department

## 2025-04-28 NOTE — OP NOTE
Lumbar Transforaminal Epidural Steroid Injection under Fluoroscopic Guidance    The procedure, risks, benefits, and options were discussed with the patient. There are no contraindications to the procedure. The patent expressed understanding and agreed to the procedure. Informed written consent was obtained prior to the start of the procedure and can be found in the patient's chart.    PATIENT NAME: Scott Gallagher   MRN: [id number]     DATE OF PROCEDURE: 04/28/2025    PROCEDURE:  Left  L4/5 and L5/S1 Lumbar Transforaminal Epidural Steroid Injection under Fluoroscopic Guidance    PRE-OP DIAGNOSIS: Lumbar radiculopathy, chronic [M54.16] Lumbar radiculopathy [M54.16]    POST-OP DIAGNOSIS: Same    PHYSICIAN: Johan Marion MD    MEDICATIONS INJECTED: Preservative-free Decadron 10mg with 5cc of Lidocaine 1% MPF     LOCAL ANESTHETIC INJECTED: Xylocaine 2%     SEDATION: Versed 2mg and Fentanyl 100mcg                                                                                                                                                                                     Conscious sedation ordered by M.D. Patient re-evaluation prior to administration of conscious sedation. No changes noted in patient's status from initial evaluation. The patient's vital signs were monitored by RN and patient remained hemodynamically stable throughout the procedure.    Event Time In   Sedation Start 1127   Sedation End 1138       ESTIMATED BLOOD LOSS: None    COMPLICATIONS: None    TECHNIQUE: Time-out was performed to identify the patient and procedure to be performed. With the patient laying in a prone position, the surgical area was prepped and draped in the usual sterile fashion using ChloraPrep and a fenestrated drape.The levels were determined under fluoroscopy guidance. Skin anesthesia was achieved by injecting Lidocaine 2% over the injection sites. The transforaminal spaces were then approached with a 22 gauge, 5 inch spinal  quinke needle that was introduced under fluoroscopic guidance in the AP and Lateral views. Once the needle tip was in the area of the transforaminal space, and there was no blood, CSF or paraesthesias, contrast dye Omnipaque (300mg/mL) was injected to confirm placement and there was no vascular runoff. Fluoroscopic imaging in the AP and lateral views revealed a clear outline of the spinal nerve with proximal spread of agent through the neural foramen into the epidural space. 3 mL of the medication mixture listed above was injected slowly at each site. Displacement of the radio opaque contrast after injection of the medication confirmed that the medication went into the area of the transforaminal spaces. The needles were removed and bleeding was nil. A sterile dressing was applied. No specimens collected. The patient tolerated the procedure well.     The patient was monitored after the procedure in the recovery area. They were given post-procedure and discharge instructions to follow at home. The patient was discharged in a stable condition.      Johan Marion MD

## 2025-04-28 NOTE — PLAN OF CARE
Pt in preop bay 23, VSS,and IV inserted. Pt denies any open wounds on body or the use of any weight loss injections. Pt ready to roll.    Procedural consents verified with pt.

## 2025-04-29 ENCOUNTER — TELEPHONE (OUTPATIENT)
Dept: INTERNAL MEDICINE | Facility: CLINIC | Age: 35
End: 2025-04-29
Payer: COMMERCIAL

## 2025-04-29 DIAGNOSIS — Z01.818 PREOPERATIVE TESTING: Primary | ICD-10-CM

## 2025-04-29 NOTE — TELEPHONE ENCOUNTER
----- Message from Nurse Frost sent at 4/29/2025  9:34 AM CDT -----  Patient is scheduled for TLIF L4-5 on  6/3 with Dr. Bond .( Approximately 245 minutes of general anesthesia) He will need medical clearance and is scheduled for  a preop clearance appt. on 4/30 with you.Thanks!

## 2025-04-29 NOTE — ANESTHESIA PAT ROS NOTE
05/27/2025  Scott Gallagher is a 34 y.o., male with Chronic pain s/p lumbar discectomy, [facility] for preop anesthesia assessment.      Pre-op Assessment    I have reviewed the Patient Summary Reports.     I have reviewed the Nursing Notes. I have reviewed the NPO Status.   I have reviewed the Medications.     Review of Systems  Anesthesia Hx:  No problems with previous Anesthesia   History of prior surgery of interest to airway management or planning:  Previous anesthesia: General 3/21/2025 FISTULOTOMY, ANAL (Anus) with general anesthesia.  Procedure performed at an [facility].      Airway issues documented on chart review include videolaryngoscope used       Denies Personal Hx of Anesthesia complications.                    Social:  Non-Smoker, No Alcohol Use  Socioeconomic History   Occupation  viola   Employment Status  Full Time  Employer  [employer]  Marital Status  Single  Faheem Walpole  Significant other  [phone] [address] [facility] [id number]  Jacquie Dominguez  Sister  [phone]           Hematology/Oncology:  Hematology Normal   Oncology Normal                                   EENT/Dental:  EENT/Dental Normal           Cardiovascular:  Exercise tolerance: good   Hypertension, well controlled       Denies Angina.     no hyperlipidemia  Denies CUMMINGS.      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  Denies Hiatal Hernia.  Denies GERD. Denies Liver Disease.  3/21/25 fistulotomy   Fistula-in-ano healing well.              Musculoskeletal:  Arthritis    Musculoskeletal General/Symptoms: low back pain.    Joint Disease:  Arthritis     Spine Disorders: lumbar Degenerative disease         Lumbar Spine Disorders, Lumbar Disc Disease Herniation lumbar disc    3/20/2025  MRI LUMBAR SPINE WITHOUT CONTRAST     CLINICAL HISTORY:  Low back pain, prior surgery, new symptoms; Dorsalgia, unspecified     TECHNIQUE:  Multiplanar, multisequence MR images were acquired from the thoracolumbar junction to the sacrum without the administration of contrast.     COMPARISON:  MR L-spine 08/19/2025.     FINDINGS:  Interval operative changes of L4-L5 laminectomy, discectomy and foraminotomy.  Mild disorganized edema signal about the operative site, without focal organized collection.     Lumbar spine alignment is within normal limits.     The vertebral body heights are well maintained, with no fracture.  No marrow signal abnormality suspicious for an infiltrative process.     The conus is normal in appearance, and terminates at the L1 level.     Disc desiccation at L3-S1. Annular fissures at L3-L4 and L5-S1.  There are findings of lumbar spondylosis, as below.     T12-L1: No spinal canal stenosis or neural foraminal narrowing.     L1-L2: No spinal canal stenosis or neural foraminal narrowing.     L2-L3:  No spinal canal stenosis or neural foraminal narrowing.     L3-L4: Central disc protrusion effaces the ventral thecal sac.  No neural foraminal narrowing.     L4-L5: Operative level.  Left paracentral disc extrusion results in severe effacement of the left lateral recess and compression of the descending left L5 nerve root.  Note made of mild left neural foraminal narrowing.     L5-S1: Central disc protrusion and mild facet arthropathy result in mild effacement of the lateral recesses as well as mild left neural foraminal narrowing.     The adjacent soft tissue structures show no significant abnormalities.     Impression:   1. Operative changes of L4-L5  laminectomy, discectomy and foraminotomy.  2. Left paracentral disc extrusion at L4-L5 results in severe effacement of the left lateral recess and compression of the descending left L5 nerve root.    Neurological:   CVA, residual symptoms Neuromuscular Disease,  Headaches      Neuro Symptoms of pain Dx of Headaches, Migraine Headache   Pain Etiology/Diagnosis, Low Back Pain, Lumbar Radiculopathy  Peripheral Neuropathy             CVA - Cerebrovasular Accident, Hemorrhagic Stroke , Most recent CVA was on 05/2021   Basal ganglia hemorrhage  Focal dystonia--left hand--last episode length was 4 days.             Endocrine:  Denies Diabetes. Denies Hypothyroidism.  Denies Hyperthyroidism.       Obesity / BMI > 30  Dermatological:  Skin Normal    Psych:   anxiety                 Physical Exam  General: Well nourished, Cooperative, Alert and Oriented    Airway:  Mallampati: II / III  Mouth Opening: Normal  Tongue: Normal  Neck ROM: Normal ROM    Dental:  Intact    Chest/Lungs:  Clear to auscultation, Normal Respiratory Rate    Heart:  Rate: Normal  Rhythm: Regular Rhythm  Sounds: Normal          Anesthesia Assessment: Preoperative EQUATION    Planned Procedure: Procedure(s) (LRB):  FUSION, SPINE, LUMBAR, TLIF, POSTERIOR APPROACH, USING PEDICLE SCREW L4-5 (L) GLOBUS ROBOT SNS: SSEP/EMG (N/A)  Requested Anesthesia Type:General  Surgeon: Raleigh Bond MD  Service: Neurosurgery  Known or anticipated Date of Surgery:6/3/2025    Surgeon notes: reviewed    Electronic QUestionnaire Assessment completed via nurse interview with patient.        Triage considerations:       Previous anesthesia records:GETA and No problems  3/21/2025 Exam under anesthesia, digital rectum fistulotomy   FISTULOTOMY, ANAL (Anus)   Airway:  Mallampati: II   Mouth Opening: Normal  TM Distance: Normal  Tongue: Normal  Neck ROM: Normal ROM     Dental:  Intact, Caps / Implants  Last PCP note: within 3 months , within [facility]   Subspecialty notes: Infectious  Disease, Neurosurgery, Ortho, Pain Management, Psychiatry, CRS  Airway Placement Date: 03/21/25 Placement Time: 1336 , created via procedure documentation Method of Intubation: Video Laryngoscopy Mask Ventilation: Easy Intubated: Postinduction Blade: Rajan #3 Airway Device Size: 8.0 Placement Verified By: Capnometry Complicating Factors: None Findings Post-Intubation: Bilateral breath sounds;Atraumatic/Condition of teeth unchanged Secured at: Lips Complications: None Removal Date: 03/21/25 Removal Time: 1405     Other important co-morbidities: PER EPIC: HTN, Obesity, and LUMBAR RADICULOPATHY, H/O STROKE      Tests already available:  Available tests,  within 3 months , [facility] .   3/21/2025 CBC, MRI LUMBAR SPINE W/O CONTRAST          Instructions given. (See in Nurse's note)    Optimization:  Anesthesia Preop Clinic Assessment  Indicated    Medical Opinion Indicated           Plan:    Testing:  BMP, EKG, PT/INR, PTT, T&S, and UA   Pre-anesthesia  visit       Visit focus: concerns in complex and/or prolonged anesthesia, COMORBIDITIES     Consultation:Patient's PCP for re-evaluation     Patient  has previously scheduled Medical Appointment:4/30 DR KAYE, 5/19 CRS    Navigation: Tests Scheduled. TBD             Consults scheduled.TBD             Results will be tracked by Preop Clinic.  5/2 medical clearance given by Dr. Stalin Kaye on 4/30: He is medically cleared and optimized for general anesthesia and spinal surgery at minimal risk of perioperative complications.   Margot Canela RN BSN

## 2025-04-29 NOTE — PRE-PROCEDURE INSTRUCTIONS
Patient stated has not had any problem with anesthesia in the past. Will need medical clearance from your PCP,Dr.Christopher Gordillo. He has an appt on 4/30. Will need poc appt, labs, ua, and EKG. Our  will call to set up these appts. He said he has not been taking his ASA 81 since 9/2024 and does not plan on restarting until after this surgery.        Preop instructions given. Hold aspirin, aspirin containing products, nsaids( Aleve, Advil, Motrin, Ibuprofen, Naprosyn, Naproxen, Voltaren, Diclofenac, Mobic, Meloxicam, Celebrex, Celecoxib), vitamins and supplements one week prior to surgery.   May take Tylenol.  ( Also sent to [facility] portal) Verbalizes understanding.

## 2025-04-30 ENCOUNTER — OFFICE VISIT (OUTPATIENT)
Dept: INTERNAL MEDICINE | Facility: CLINIC | Age: 35
End: 2025-04-30
Attending: FAMILY MEDICINE
Payer: COMMERCIAL

## 2025-04-30 VITALS
DIASTOLIC BLOOD PRESSURE: 76 MMHG | WEIGHT: 301.5 LBS | OXYGEN SATURATION: 97 % | HEART RATE: 73 BPM | BODY MASS INDEX: 37.49 KG/M2 | SYSTOLIC BLOOD PRESSURE: 118 MMHG | HEIGHT: 75 IN

## 2025-04-30 DIAGNOSIS — I10 PRIMARY HYPERTENSION: Primary | ICD-10-CM

## 2025-04-30 DIAGNOSIS — K60.30 FISTULA-IN-ANO: ICD-10-CM

## 2025-04-30 DIAGNOSIS — M51.26 LUMBAR DISC HERNIATION: ICD-10-CM

## 2025-04-30 PROCEDURE — 99999 PR PBB SHADOW E&M-EST. PATIENT-LVL III: CPT | Mod: PBBFAC,,, | Performed by: FAMILY MEDICINE

## 2025-04-30 PROCEDURE — 1159F MED LIST DOCD IN RCRD: CPT | Mod: CPTII,S$GLB,, | Performed by: FAMILY MEDICINE

## 2025-04-30 PROCEDURE — 1160F RVW MEDS BY RX/DR IN RCRD: CPT | Mod: CPTII,S$GLB,, | Performed by: FAMILY MEDICINE

## 2025-04-30 PROCEDURE — 99215 OFFICE O/P EST HI 40 MIN: CPT | Mod: S$GLB,,, | Performed by: FAMILY MEDICINE

## 2025-04-30 PROCEDURE — 3078F DIAST BP <80 MM HG: CPT | Mod: CPTII,S$GLB,, | Performed by: FAMILY MEDICINE

## 2025-04-30 PROCEDURE — 3008F BODY MASS INDEX DOCD: CPT | Mod: CPTII,S$GLB,, | Performed by: FAMILY MEDICINE

## 2025-04-30 PROCEDURE — G2211 COMPLEX E/M VISIT ADD ON: HCPCS | Mod: S$GLB,,, | Performed by: FAMILY MEDICINE

## 2025-04-30 PROCEDURE — 3074F SYST BP LT 130 MM HG: CPT | Mod: CPTII,S$GLB,, | Performed by: FAMILY MEDICINE

## 2025-04-30 NOTE — PROGRESS NOTES
"CHIEF COMPLAINT:  Preop    HISTORY OF PRESENT ILLNESS: The patient is a 34 year-old WM.  In summary, DDD lead to lami and diskectomy 9/29/24.  Did well for about 6 months.  Atraumatic disc herniation 3/3/25 while bending over in a thrift store.  He has twice Failed SUSANNA.  Thus, he Needs 5/1 fusion soon    His hypertension is well-controlled    The patient is Healthy except ruptured aneurysm 2021 d/t HTN.    He also has Left sciatica for several months.    He sees CRS for significant enterofistula.    ED responds nicely to viagra prn    REVIEW OF SYSTEMS:  GENERAL: No fever, chills, fatigability or weight loss.  SKIN: No rashes, itching or changes in color or texture of skin.  HEAD: No headaches or recent head trauma.  EYES: Visual acuity fine. No photophobia, ocular pain or diplopia.  EARS: Denies ear pain, discharge or vertigo.  NOSE: No loss of smell, no epistaxis or postnasal drip.  MOUTH & THROAT: No hoarseness or change in voice. No excessive gum bleeding.  NODES: Denies swollen glands.  CHEST: Denies CUMMINGS, cyanosis, wheezing, cough and sputum production.  CARDIOVASCULAR: Denies chest pain, PND, orthopnea or reduced exercise tolerance.  ABDOMEN: Appetite fine. No weight loss. Denies diarrhea, abdominal pain, hematemesis.  URINARY: No flank pain, dysuria or hematuria.  PERIPHERAL VASCULAR: No claudication or cyanosis.  MUSCULOSKELETAL: No joint stiffness or swelling.   NEUROLOGIC: No history of seizures, paralysis, alteration of gait or coordination.    SOCIAL HISTORY: The patient does not smoke.  The patient consumes alcohol socially.  The patient is single.    PHYSICAL EXAMINATION:   Blood pressure 118/76, pulse 73, height 6' 3" (1.905 m), weight (!) 136.7 kg (301 lb 7.7 oz), SpO2 97%.  APPEARANCE: Well nourished, well developed, in no acute distress.    HEAD: Normocephalic, atraumatic.  EYES: PERRL. EOMI.  Conjunctivae without injection and  anicteric  NOSE: Mucosa pink. Airway clear.  MOUTH & THROAT: No " tonsillar enlargement. No pharyngeal erythema or exudate. No stridor.  NECK: Supple.   NODES: No cervical, axillary or inguinal lymph node enlargement.  CHEST: Lungs clear to auscultation.  No retractions are noted.  No rales or rhonchi are present.  CARDIOVASCULAR: Normal S1, S2. No rubs, murmurs or gallops.  ABDOMEN: Bowel sounds normal. Not distended. Soft. No tenderness or masses.  No ascites is noted.  MUSCULOSKELETAL:  There is no clubbing, cyanosis, or edema of the extremities x4.  There is full range of motion of the lumbar spine.  There is full range of motion of the extremities x4.  There is no deformity noted.    NEUROLOGIC:       Normal speech development.      Hearing normal.      Normal gait.      Motor and sensory exams grossly normal.  PSYCHIATRIC: Patient is alert and oriented x3.  Thought processes are all normal.  There is no homicidality.  There is no suicidality.  There is no evidence of psychosis.    LABORATORY/RADIOLOGY:   Chart reviewed.      ASSESSMENT:   L4/5 fusion next month  HTN    ruptured aneurysm 2021 d/t HTN  ED     PLAN:  He is medically cleared and optimized for general anesthesia and spinal surgery at minimal risk of perioperative complications.  viagra prn  Return to clinic in one year.

## 2025-05-12 ENCOUNTER — PATIENT MESSAGE (OUTPATIENT)
Dept: PAIN MEDICINE | Facility: CLINIC | Age: 35
End: 2025-05-12
Payer: COMMERCIAL

## 2025-05-12 ENCOUNTER — PATIENT MESSAGE (OUTPATIENT)
Dept: ORTHOPEDICS | Facility: CLINIC | Age: 35
End: 2025-05-12
Payer: COMMERCIAL

## 2025-05-12 DIAGNOSIS — M47.816 LUMBAR SPONDYLOSIS: Primary | ICD-10-CM

## 2025-05-13 RX ORDER — OXYCODONE HYDROCHLORIDE 5 MG/1
5 TABLET ORAL EVERY 8 HOURS PRN
Qty: 21 TABLET | Refills: 0 | Status: SHIPPED | OUTPATIENT
Start: 2025-05-13

## 2025-05-14 RX ORDER — METHYLPREDNISOLONE 4 MG/1
TABLET ORAL
Qty: 1 EACH | Refills: 0 | Status: SHIPPED | OUTPATIENT
Start: 2025-05-14 | End: 2025-06-04

## 2025-05-18 ENCOUNTER — PATIENT MESSAGE (OUTPATIENT)
Dept: SURGERY | Facility: CLINIC | Age: 35
End: 2025-05-18
Payer: COMMERCIAL

## 2025-05-19 ENCOUNTER — PATIENT MESSAGE (OUTPATIENT)
Dept: ORTHOPEDICS | Facility: CLINIC | Age: 35
End: 2025-05-19
Payer: COMMERCIAL

## 2025-05-26 ENCOUNTER — OFFICE VISIT (OUTPATIENT)
Dept: SURGERY | Facility: CLINIC | Age: 35
End: 2025-05-26
Payer: COMMERCIAL

## 2025-05-26 VITALS
SYSTOLIC BLOOD PRESSURE: 128 MMHG | BODY MASS INDEX: 37.28 KG/M2 | HEIGHT: 75 IN | DIASTOLIC BLOOD PRESSURE: 75 MMHG | WEIGHT: 299.81 LBS

## 2025-05-26 DIAGNOSIS — K60.30 FISTULA-IN-ANO: Primary | ICD-10-CM

## 2025-05-26 PROCEDURE — 1159F MED LIST DOCD IN RCRD: CPT | Mod: CPTII,S$GLB,, | Performed by: SURGERY

## 2025-05-26 PROCEDURE — 3078F DIAST BP <80 MM HG: CPT | Mod: CPTII,S$GLB,, | Performed by: SURGERY

## 2025-05-26 PROCEDURE — 99999 PR PBB SHADOW E&M-EST. PATIENT-LVL III: CPT | Mod: PBBFAC,,, | Performed by: SURGERY

## 2025-05-26 PROCEDURE — 3074F SYST BP LT 130 MM HG: CPT | Mod: CPTII,S$GLB,, | Performed by: SURGERY

## 2025-05-26 PROCEDURE — 99024 POSTOP FOLLOW-UP VISIT: CPT | Mod: S$GLB,,, | Performed by: SURGERY

## 2025-05-26 NOTE — PROGRESS NOTES
Colon & Rectal Surgery Clinic Follow Up    HPI:   Scott Gallagher is a 34 y.o. male who presents for follow up of     3/21/25 fistulotomy     Interval history:   Having fusion in a week.  Having regular bowel movements without pain.  No bleeding or drainage.     Objective:   Vitals:    05/26/25 1402   BP: 128/75        Physical Exam   Gen: well developed male, NAD  HEENT: normocephalic, atraumatic, PERRL, EOMI   CV: RRR, no murmurs  Resp: nonlabored, CTAB   Abd: soft, NTND   MSK: back brace in place, limited range of motion   Anorectal: healed fistulotomy scar, small residual wound at anal verge     Assessment and Plan:   Scott Gallagher  is a 34 y.o. male who presents for follow up of fistula in ano     - patient healing well from anorectal surgery   - maintain bowel regimen during spine surgery   - follow up PRN       Sun Finley MD, FACS, FASCRS  Staff Surgeon   Colon & Rectal Surgery

## 2025-05-27 ENCOUNTER — HOSPITAL ENCOUNTER (OUTPATIENT)
Dept: CARDIOLOGY | Facility: CLINIC | Age: 35
Discharge: HOME | End: 2025-05-27
Payer: COMMERCIAL

## 2025-05-27 ENCOUNTER — HOSPITAL ENCOUNTER (OUTPATIENT)
Dept: PREADMISSION TESTING | Facility: HOSPITAL | Age: 35
Discharge: HOME | End: 2025-05-27
Attending: ORTHOPAEDIC SURGERY
Payer: COMMERCIAL

## 2025-05-27 VITALS
WEIGHT: 291.25 LBS | OXYGEN SATURATION: 97 % | DIASTOLIC BLOOD PRESSURE: 80 MMHG | BODY MASS INDEX: 36.4 KG/M2 | SYSTOLIC BLOOD PRESSURE: 112 MMHG | HEART RATE: 111 BPM | TEMPERATURE: 98 F

## 2025-05-27 DIAGNOSIS — Z01.818 PREOPERATIVE TESTING: ICD-10-CM

## 2025-05-27 LAB
OHS QRS DURATION: 88 MS
OHS QTC CALCULATION: 433 MS

## 2025-05-27 PROCEDURE — 93010 ELECTROCARDIOGRAM REPORT: CPT | Mod: S$GLB,,, | Performed by: INTERNAL MEDICINE

## 2025-05-27 PROCEDURE — 93005 ELECTROCARDIOGRAM TRACING: CPT | Mod: S$GLB,,, | Performed by: ANESTHESIOLOGY

## 2025-05-27 NOTE — DISCHARGE INSTRUCTIONS
Your surgery has been scheduled for:6/3/2025    You should report to: [facility], located on the [facility] side of the Second floor of the [facility] ([phone])      Please Note   Tell your doctor if you take Aspirin, products containing Aspirin, herbal medications  or blood thinners, such as Coumadin, Ticlid, or Plavix.  (Consult your provider regarding holding or stopping before surgery).  Arrange for someone to drive you home following surgery.  You will not be allowed to leave the surgical facility alone or drive yourself home following sedation and anesthesia.    Before Surgery  Stop taking all herbal medications, vitamins, and supplements 7 days prior to surgery  No Motrin/Advil (Ibuprofen) 7 days before surgery  No Aleve (Naproxen) 7 days before surgery  Stop Taking Asprin, products containing Asprin _7____days before surgery  Stop taking blood thinners_______days before surgery  No Goody's/BC  Powder 7 days before surgery  Refrain from drinking alcoholic beverages for 24hours before and after surgery  Stop or limit smoking __7_______days before surgery  You may take Tylenol for pain    Night before Surgery  Do not eat or drink after midnight  Take a shower or bath (shower is recommended).  Bathe with Hibiclens soap or an antibacterial soap from the neck down.  If not supplied by your surgeon, hibiclens soap will need to be purchased over the counter in pharmacy.  Rinse soap off thoroughly.  Shampoo your hair with your regular shampoo    The Day of Surgery  Take another bath or shower with hibiclens or any antibacterial soap, to reduce the chance of infection.  Take heart and blood pressure medications with a small sip of water, as advised by the perioperative team.  Do not take fluid pills  You may brush your teeth and rinse your mouth, but do not swall any additional water.   Do not apply perfumes, powder, body lotions or deodorant on the day of surgery.  Nail  polish should be removed.  Do not wear makeup or moisturizer  Wear comfortable clothes, such as a button front shirt and loose fitting pants.  Leave all jewelry, including body piercings, and valuables at home.    Bring any devices you will neeed after surgery such as crutches or canes.  If you have sleep apnea, please bring your CPAP machine  In the event that your physical condition changes including the onset of a cold or respiratory illness, or if you have to delay or cancel your surgery, please notify your surgeon.     Anesthesia: General Anesthesia     You are watched continuously during your procedure by your anesthesia provider.     Youre due to have surgery. During surgery, youll be given medicine called anesthesia or anesthetic. This will keep you comfortable and pain-free. Your anesthesia provider will use general anesthesia.  What is general anesthesia?  General anesthesia puts you into a state like deep sleep. It goes into the bloodstream (IV anesthetics), into the lungs (gas anesthetics), or both. You feel nothing during the procedure. You will not remember it. During the procedure, the anesthesia provider monitors you continuously. He or she checks your heart rate and rhythm, blood pressure, breathing, and blood oxygen.  IV anesthetics. IV anesthetics are given through an IV line in your arm. Theyre often given first. This is so you are asleep before a gas anesthetic is started. Some kinds of IV anesthetics relieve pain. Others relax you. Your doctor will decide which kind is best in your case.  Gas anesthetics. Gas anesthetics are breathed into the lungs. They are often used to keep you asleep. They can be given through a facemask or a tube placed in your larynx or trachea (breathing tube).  If you have a facemask, your anesthesia provider will most likely place it over your nose and mouth while youre still awake. Youll breathe oxygen through the mask as your IV anesthetic is started. Gas  anesthetic may be added through the mask.  If you have a tube in the larynx or trachea, it will be inserted into your throat after youre asleep.  Anesthesia tools and medicines  You will likely have:  IV anesthetics. These are put into an IV line into your bloodstream.  Gas anesthetics. You breathe these anesthetics into your lungs, where they pass into your bloodstream.  Pulse oximeter. This is a small clip that is attached to the end of your finger. This measures your blood oxygen level.  Electrocardiography leads (electrodes). These are small sticky pads that are placed on your chest. They record your heart rate and rhythm.  Blood pressure cuff. This reads your blood pressure.  Risks and possible complications  General anesthesia has some risks. These include:  Breathing problems  Nausea and vomiting  Sore throat or hoarseness (usually temporary)  Allergic reaction to the anesthetic  Irregular heartbeat (rare)  Cardiac arrest (rare)   Anesthesia safety  Follow all instructions you are given for how long not to eat or drink before your procedure.  Be sure your doctor knows what medicines and drugs you take. This includes over-the-counter medicines, herbs, supplements, alcohol or other drugs. You will be asked when those were last taken.  Have an adult family member or friend drive you home after the procedure.  For the first 24 hours after your surgery:  Do not drive or use heavy equipment.  Do not make important decisions or sign legal documents. If important decisions or signing legal documents is necessary during the first 24 hours after surgery, have a trusted family member or spouse act on your behalf.  Avoid alcohol.  Have a responsible adult stay with you. He or she can watch for problems and help keep you safe.  Date Last Reviewed: 12/1/2016 © 2000-2017 [employer]. [address] [place] [address], [place] [address]. All rights reserved. This information is not intended as a substitute for  professional medical care. Always follow your healthcare professional's instructions.

## 2025-05-28 ENCOUNTER — HOSPITAL ENCOUNTER (OUTPATIENT)
Dept: RADIOLOGY | Facility: HOSPITAL | Age: 35
Discharge: HOME | End: 2025-05-28
Attending: ORTHOPAEDIC SURGERY
Payer: COMMERCIAL

## 2025-05-28 DIAGNOSIS — M48.07 SPINAL STENOSIS, LUMBOSACRAL REGION: ICD-10-CM

## 2025-05-28 PROCEDURE — 72131 CT LUMBAR SPINE W/O DYE: CPT | Mod: 26,,, | Performed by: RADIOLOGY

## 2025-05-28 PROCEDURE — 72131 CT LUMBAR SPINE W/O DYE: CPT | Mod: TC

## 2025-06-02 ENCOUNTER — ANESTHESIA EVENT (OUTPATIENT)
Dept: SURGERY | Facility: HOSPITAL | Age: 35
End: 2025-06-02
Payer: COMMERCIAL

## 2025-06-02 ENCOUNTER — PATIENT MESSAGE (OUTPATIENT)
Dept: INFECTIOUS DISEASES | Facility: CLINIC | Age: 35
End: 2025-06-02
Payer: COMMERCIAL

## 2025-06-02 ENCOUNTER — TELEPHONE (OUTPATIENT)
Dept: ORTHOPEDIC SURGERY | Facility: CLINIC | Age: 35
End: 2025-06-02
Payer: COMMERCIAL

## 2025-06-03 ENCOUNTER — ANESTHESIA (OUTPATIENT)
Dept: SURGERY | Facility: HOSPITAL | Age: 35
End: 2025-06-03
Payer: COMMERCIAL

## 2025-06-03 ENCOUNTER — HOSPITAL ENCOUNTER (INPATIENT)
Facility: HOSPITAL | Age: 35
LOS: 2 days | Discharge: HOME HEALTH | DRG: 402 | End: 2025-06-06
Attending: ORTHOPAEDIC SURGERY | Admitting: ORTHOPAEDIC SURGERY
Payer: COMMERCIAL

## 2025-06-03 DIAGNOSIS — I10 PRIMARY HYPERTENSION: ICD-10-CM

## 2025-06-03 DIAGNOSIS — F41.9 ANXIETY: ICD-10-CM

## 2025-06-03 DIAGNOSIS — G43.109 MIGRAINE AURA WITHOUT HEADACHE: ICD-10-CM

## 2025-06-03 DIAGNOSIS — M47.816 LUMBAR SPONDYLOSIS: ICD-10-CM

## 2025-06-03 DIAGNOSIS — M62.89 PELVIC FLOOR DYSFUNCTION: ICD-10-CM

## 2025-06-03 DIAGNOSIS — I77.71 DISSECTION OF LEFT CAROTID ARTERY: ICD-10-CM

## 2025-06-03 DIAGNOSIS — M51.26 LUMBAR DISC HERNIATION: ICD-10-CM

## 2025-06-03 DIAGNOSIS — M43.06 LUMBAR SPONDYLOLYSIS: Primary | ICD-10-CM

## 2025-06-03 DIAGNOSIS — Z98.1 S/P LUMBAR SPINAL FUSION: Primary | ICD-10-CM

## 2025-06-03 DIAGNOSIS — I61.0 BASAL GANGLIA HEMORRHAGE: ICD-10-CM

## 2025-06-03 DIAGNOSIS — K60.30 FISTULA-IN-ANO: ICD-10-CM

## 2025-06-03 PROCEDURE — 63600175 PHARM REV CODE 636 W HCPCS

## 2025-06-03 PROCEDURE — 0KXG0ZZ TRANSFER LEFT TRUNK MUSCLE, OPEN APPROACH: ICD-10-PCS | Performed by: ORTHOPAEDIC SURGERY

## 2025-06-03 PROCEDURE — 94799 UNLISTED PULMONARY SVC/PX: CPT

## 2025-06-03 PROCEDURE — 00NY0ZZ RELEASE LUMBAR SPINAL CORD, OPEN APPROACH: ICD-10-PCS | Performed by: ORTHOPAEDIC SURGERY

## 2025-06-03 PROCEDURE — 22840 INSERT SPINE FIXATION DEVICE: CPT | Mod: ,,, | Performed by: ORTHOPAEDIC SURGERY

## 2025-06-03 PROCEDURE — 63052 LAM FACETC/FRMT ARTHRD LUM 1: CPT | Mod: ,,, | Performed by: ORTHOPAEDIC SURGERY

## 2025-06-03 PROCEDURE — 25000003 PHARM REV CODE 250

## 2025-06-03 PROCEDURE — 0SG00AJ FUSION OF LUMBAR VERTEBRAL JOINT WITH INTERBODY FUSION DEVICE, POSTERIOR APPROACH, ANTERIOR COLUMN, OPEN APPROACH: ICD-10-PCS | Performed by: ORTHOPAEDIC SURGERY

## 2025-06-03 PROCEDURE — 0SB20ZZ EXCISION OF LUMBAR VERTEBRAL DISC, OPEN APPROACH: ICD-10-PCS | Performed by: ORTHOPAEDIC SURGERY

## 2025-06-03 PROCEDURE — 20936 SP BONE AGRFT LOCAL ADD-ON: CPT | Mod: ,,, | Performed by: ORTHOPAEDIC SURGERY

## 2025-06-03 PROCEDURE — 36000710: Performed by: ORTHOPAEDIC SURGERY

## 2025-06-03 PROCEDURE — 63600175 PHARM REV CODE 636 W HCPCS: Performed by: STUDENT IN AN ORGANIZED HEALTH CARE EDUCATION/TRAINING PROGRAM

## 2025-06-03 PROCEDURE — 97161 PT EVAL LOW COMPLEX 20 MIN: CPT

## 2025-06-03 PROCEDURE — 97530 THERAPEUTIC ACTIVITIES: CPT

## 2025-06-03 PROCEDURE — 71000016 HC POSTOP RECOV ADDL HR: Performed by: ORTHOPAEDIC SURGERY

## 2025-06-03 PROCEDURE — 8E0W4CZ ROBOTIC ASSISTED PROCEDURE OF TRUNK REGION, PERCUTANEOUS ENDOSCOPIC APPROACH: ICD-10-PCS | Performed by: ORTHOPAEDIC SURGERY

## 2025-06-03 PROCEDURE — 97165 OT EVAL LOW COMPLEX 30 MIN: CPT

## 2025-06-03 PROCEDURE — 20930 SP BONE ALGRFT MORSEL ADD-ON: CPT | Mod: ,,, | Performed by: ORTHOPAEDIC SURGERY

## 2025-06-03 PROCEDURE — 37000009 HC ANESTHESIA EA ADD 15 MINS: Performed by: ORTHOPAEDIC SURGERY

## 2025-06-03 PROCEDURE — C1713 ANCHOR/SCREW BN/BN,TIS/BN: HCPCS | Performed by: ORTHOPAEDIC SURGERY

## 2025-06-03 PROCEDURE — 94761 N-INVAS EAR/PLS OXIMETRY MLT: CPT

## 2025-06-03 PROCEDURE — 0SG0071 FUSION OF LUMBAR VERTEBRAL JOINT WITH AUTOLOGOUS TISSUE SUBSTITUTE, POSTERIOR APPROACH, POSTERIOR COLUMN, OPEN APPROACH: ICD-10-PCS | Performed by: ORTHOPAEDIC SURGERY

## 2025-06-03 PROCEDURE — 22633 ARTHRD CMBN 1NTRSPC LUMBAR: CPT | Mod: 22,,, | Performed by: ORTHOPAEDIC SURGERY

## 2025-06-03 PROCEDURE — 63600175 PHARM REV CODE 636 W HCPCS: Performed by: ORTHOPAEDIC SURGERY

## 2025-06-03 PROCEDURE — 97112 NEUROMUSCULAR REEDUCATION: CPT

## 2025-06-03 PROCEDURE — 01NB0ZZ RELEASE LUMBAR NERVE, OPEN APPROACH: ICD-10-PCS | Performed by: ORTHOPAEDIC SURGERY

## 2025-06-03 PROCEDURE — 25000003 PHARM REV CODE 250: Performed by: STUDENT IN AN ORGANIZED HEALTH CARE EDUCATION/TRAINING PROGRAM

## 2025-06-03 PROCEDURE — 37000008 HC ANESTHESIA 1ST 15 MINUTES: Performed by: ORTHOPAEDIC SURGERY

## 2025-06-03 PROCEDURE — 27201423 OPTIME MED/SURG SUP & DEVICES STERILE SUPPLY: Performed by: ORTHOPAEDIC SURGERY

## 2025-06-03 PROCEDURE — 15734 MUSCLE-SKIN GRAFT TRUNK: CPT | Mod: 51,,, | Performed by: ORTHOPAEDIC SURGERY

## 2025-06-03 PROCEDURE — 61783 SCAN PROC SPINAL: CPT | Mod: XU,,, | Performed by: ORTHOPAEDIC SURGERY

## 2025-06-03 PROCEDURE — 0KXF0ZZ TRANSFER RIGHT TRUNK MUSCLE, OPEN APPROACH: ICD-10-PCS | Performed by: ORTHOPAEDIC SURGERY

## 2025-06-03 PROCEDURE — 36000711: Performed by: ORTHOPAEDIC SURGERY

## 2025-06-03 PROCEDURE — 71000015 HC POSTOP RECOV 1ST HR: Performed by: ORTHOPAEDIC SURGERY

## 2025-06-03 PROCEDURE — 22853 INSJ BIOMECHANICAL DEVICE: CPT | Mod: ,,, | Performed by: ORTHOPAEDIC SURGERY

## 2025-06-03 PROCEDURE — 99900035 HC TECH TIME PER 15 MIN (STAT)

## 2025-06-03 PROCEDURE — 27800903 OPTIME MED/SURG SUP & DEVICES OTHER IMPLANTS: Performed by: ORTHOPAEDIC SURGERY

## 2025-06-03 PROCEDURE — 71000033 HC RECOVERY, INTIAL HOUR: Performed by: ORTHOPAEDIC SURGERY

## 2025-06-03 DEVICE — CAP SPINAL LOCK THRD CREO 5.5: Type: IMPLANTABLE DEVICE | Site: SPINE LUMBAR | Status: FUNCTIONAL

## 2025-06-03 DEVICE — PUTTY OSTEOSELECT DBM SYR 5CC: Type: IMPLANTABLE DEVICE | Site: SPINE LUMBAR | Status: FUNCTIONAL

## 2025-06-03 DEVICE — IMPLANTABLE DEVICE: Type: IMPLANTABLE DEVICE | Site: SPINE LUMBAR | Status: FUNCTIONAL

## 2025-06-03 DEVICE — SCREW BONE SPINAL CREO 6.5X45: Type: IMPLANTABLE DEVICE | Site: SPINE LUMBAR | Status: FUNCTIONAL

## 2025-06-03 DEVICE — SCREW BONE SPINAL 5.5X45MM: Type: IMPLANTABLE DEVICE | Site: SPINE LUMBAR | Status: FUNCTIONAL

## 2025-06-03 DEVICE — BONE 30CC CANCELLOUS CRUSHED: Type: IMPLANTABLE DEVICE | Site: SPINE LUMBAR | Status: FUNCTIONAL

## 2025-06-03 RX ORDER — KETAMINE HCL IN 0.9 % NACL 50 MG/5 ML
SYRINGE (ML) INTRAVENOUS
Status: DISCONTINUED | OUTPATIENT
Start: 2025-06-03 | End: 2025-06-03

## 2025-06-03 RX ORDER — PHENYLEPHRINE HYDROCHLORIDE 10 MG/ML
INJECTION INTRAVENOUS
Status: DISCONTINUED | OUTPATIENT
Start: 2025-06-03 | End: 2025-06-03

## 2025-06-03 RX ORDER — DEXAMETHASONE SODIUM PHOSPHATE 4 MG/ML
INJECTION, SOLUTION INTRA-ARTICULAR; INTRALESIONAL; INTRAMUSCULAR; INTRAVENOUS; SOFT TISSUE
Status: DISCONTINUED | OUTPATIENT
Start: 2025-06-03 | End: 2025-06-03

## 2025-06-03 RX ORDER — METHOCARBAMOL 500 MG/1
500 TABLET, FILM COATED ORAL ONCE
Status: COMPLETED | OUTPATIENT
Start: 2025-06-03 | End: 2025-06-03

## 2025-06-03 RX ORDER — OXYCODONE HYDROCHLORIDE 5 MG/1
5 TABLET ORAL EVERY 4 HOURS PRN
Qty: 30 TABLET | Refills: 0 | Status: SHIPPED | OUTPATIENT
Start: 2025-06-03

## 2025-06-03 RX ORDER — HYDROMORPHONE HYDROCHLORIDE 1 MG/ML
0.2 INJECTION, SOLUTION INTRAMUSCULAR; INTRAVENOUS; SUBCUTANEOUS EVERY 5 MIN PRN
Status: DISCONTINUED | OUTPATIENT
Start: 2025-06-03 | End: 2025-06-03 | Stop reason: HOSPADM

## 2025-06-03 RX ORDER — PHENYLEPHRINE HCL IN 0.9% NACL 1 MG/10 ML
SYRINGE (ML) INTRAVENOUS
Status: DISCONTINUED | OUTPATIENT
Start: 2025-06-03 | End: 2025-06-03

## 2025-06-03 RX ORDER — HYDROMORPHONE HYDROCHLORIDE 1 MG/ML
0.5 INJECTION, SOLUTION INTRAMUSCULAR; INTRAVENOUS; SUBCUTANEOUS
Status: DISCONTINUED | OUTPATIENT
Start: 2025-06-03 | End: 2025-06-03

## 2025-06-03 RX ORDER — ACETAMINOPHEN 325 MG/1
650 TABLET ORAL EVERY 8 HOURS
Status: DISCONTINUED | OUTPATIENT
Start: 2025-06-03 | End: 2025-06-04

## 2025-06-03 RX ORDER — LIDOCAINE HYDROCHLORIDE 20 MG/ML
INJECTION, SOLUTION EPIDURAL; INFILTRATION; INTRACAUDAL; PERINEURAL
Status: DISCONTINUED | OUTPATIENT
Start: 2025-06-03 | End: 2025-06-03

## 2025-06-03 RX ORDER — HEPARIN SODIUM 5000 [USP'U]/ML
5000 INJECTION, SOLUTION INTRAVENOUS; SUBCUTANEOUS EVERY 8 HOURS
Status: DISCONTINUED | OUTPATIENT
Start: 2025-06-03 | End: 2025-06-06 | Stop reason: HOSPADM

## 2025-06-03 RX ORDER — GLUCAGON 1 MG
1 KIT INJECTION
Status: DISCONTINUED | OUTPATIENT
Start: 2025-06-03 | End: 2025-06-03 | Stop reason: HOSPADM

## 2025-06-03 RX ORDER — FENTANYL CITRATE 50 UG/ML
INJECTION, SOLUTION INTRAMUSCULAR; INTRAVENOUS
Status: DISCONTINUED | OUTPATIENT
Start: 2025-06-03 | End: 2025-06-03

## 2025-06-03 RX ORDER — PROCHLORPERAZINE EDISYLATE 5 MG/ML
5 INJECTION INTRAMUSCULAR; INTRAVENOUS EVERY 30 MIN PRN
Status: DISCONTINUED | OUTPATIENT
Start: 2025-06-03 | End: 2025-06-03 | Stop reason: HOSPADM

## 2025-06-03 RX ORDER — ONDANSETRON HYDROCHLORIDE 2 MG/ML
INJECTION, SOLUTION INTRAVENOUS
Status: DISCONTINUED | OUTPATIENT
Start: 2025-06-03 | End: 2025-06-03

## 2025-06-03 RX ORDER — CEFAZOLIN 2 G/1
2 INJECTION, POWDER, FOR SOLUTION INTRAMUSCULAR; INTRAVENOUS
Status: COMPLETED | OUTPATIENT
Start: 2025-06-03 | End: 2025-06-03

## 2025-06-03 RX ORDER — HYDROMORPHONE HYDROCHLORIDE 2 MG/ML
0.5 INJECTION, SOLUTION INTRAMUSCULAR; INTRAVENOUS; SUBCUTANEOUS
Refills: 0 | Status: DISCONTINUED | OUTPATIENT
Start: 2025-06-03 | End: 2025-06-06 | Stop reason: HOSPADM

## 2025-06-03 RX ORDER — CEFAZOLIN 2 G/1
2 INJECTION, POWDER, FOR SOLUTION INTRAMUSCULAR; INTRAVENOUS
Status: DISCONTINUED | OUTPATIENT
Start: 2025-06-03 | End: 2025-06-03 | Stop reason: HOSPADM

## 2025-06-03 RX ORDER — ROCURONIUM BROMIDE 10 MG/ML
INJECTION, SOLUTION INTRAVENOUS
Status: DISCONTINUED | OUTPATIENT
Start: 2025-06-03 | End: 2025-06-03

## 2025-06-03 RX ORDER — PROPOFOL 10 MG/ML
VIAL (ML) INTRAVENOUS
Status: DISCONTINUED | OUTPATIENT
Start: 2025-06-03 | End: 2025-06-03

## 2025-06-03 RX ORDER — SODIUM CHLORIDE 0.9 % (FLUSH) 0.9 %
10 SYRINGE (ML) INJECTION
Status: DISCONTINUED | OUTPATIENT
Start: 2025-06-03 | End: 2025-06-03 | Stop reason: HOSPADM

## 2025-06-03 RX ORDER — METHOCARBAMOL 750 MG/1
750 TABLET, FILM COATED ORAL 3 TIMES DAILY
Status: DISCONTINUED | OUTPATIENT
Start: 2025-06-03 | End: 2025-06-04

## 2025-06-03 RX ORDER — CELECOXIB 200 MG/1
200 CAPSULE ORAL DAILY
Qty: 14 CAPSULE | Refills: 0 | Status: SHIPPED | OUTPATIENT
Start: 2025-06-03 | End: 2025-06-20

## 2025-06-03 RX ORDER — HALOPERIDOL LACTATE 5 MG/ML
INJECTION, SOLUTION INTRAMUSCULAR
Status: COMPLETED
Start: 2025-06-03 | End: 2025-06-03

## 2025-06-03 RX ORDER — ACETAMINOPHEN 500 MG
1000 TABLET ORAL EVERY 8 HOURS
Qty: 84 TABLET | Refills: 0 | Status: SHIPPED | OUTPATIENT
Start: 2025-06-03 | End: 2025-06-06

## 2025-06-03 RX ORDER — CELECOXIB 200 MG/1
200 CAPSULE ORAL DAILY
Status: DISCONTINUED | OUTPATIENT
Start: 2025-06-03 | End: 2025-06-06 | Stop reason: HOSPADM

## 2025-06-03 RX ORDER — CEFAZOLIN SODIUM 1 G/3ML
INJECTION, POWDER, FOR SOLUTION INTRAMUSCULAR; INTRAVENOUS
Status: DISCONTINUED | OUTPATIENT
Start: 2025-06-03 | End: 2025-06-03

## 2025-06-03 RX ORDER — METHOCARBAMOL 750 MG/1
750 TABLET, FILM COATED ORAL 3 TIMES DAILY
Status: DISCONTINUED | OUTPATIENT
Start: 2025-06-03 | End: 2025-06-03

## 2025-06-03 RX ORDER — POLYETHYLENE GLYCOL 3350 17 G/17G
17 POWDER, FOR SOLUTION ORAL DAILY
Status: DISCONTINUED | OUTPATIENT
Start: 2025-06-03 | End: 2025-06-06 | Stop reason: HOSPADM

## 2025-06-03 RX ORDER — HALOPERIDOL LACTATE 5 MG/ML
0.5 INJECTION, SOLUTION INTRAMUSCULAR EVERY 10 MIN PRN
Status: DISCONTINUED | OUTPATIENT
Start: 2025-06-03 | End: 2025-06-03 | Stop reason: HOSPADM

## 2025-06-03 RX ORDER — DEXMEDETOMIDINE HYDROCHLORIDE 100 UG/ML
INJECTION, SOLUTION INTRAVENOUS
Status: DISCONTINUED | OUTPATIENT
Start: 2025-06-03 | End: 2025-06-03

## 2025-06-03 RX ORDER — ACETAMINOPHEN 500 MG
1000 TABLET ORAL
Status: COMPLETED | OUTPATIENT
Start: 2025-06-03 | End: 2025-06-03

## 2025-06-03 RX ORDER — LOSARTAN POTASSIUM AND HYDROCHLOROTHIAZIDE 25; 100 MG/1; MG/1
1 TABLET ORAL DAILY
Status: DISCONTINUED | OUTPATIENT
Start: 2025-06-04 | End: 2025-06-06 | Stop reason: HOSPADM

## 2025-06-03 RX ORDER — VANCOMYCIN HYDROCHLORIDE 1 G/20ML
INJECTION, POWDER, LYOPHILIZED, FOR SOLUTION INTRAVENOUS
Status: DISCONTINUED | OUTPATIENT
Start: 2025-06-03 | End: 2025-06-03 | Stop reason: HOSPADM

## 2025-06-03 RX ORDER — MUPIROCIN 20 MG/G
OINTMENT TOPICAL
Status: DISCONTINUED | OUTPATIENT
Start: 2025-06-03 | End: 2025-06-03 | Stop reason: HOSPADM

## 2025-06-03 RX ORDER — OXYCODONE HYDROCHLORIDE 10 MG/1
10 TABLET ORAL EVERY 4 HOURS PRN
Status: DISCONTINUED | OUTPATIENT
Start: 2025-06-03 | End: 2025-06-04

## 2025-06-03 RX ORDER — OXYCODONE HYDROCHLORIDE 5 MG/1
5 TABLET ORAL EVERY 4 HOURS PRN
Status: DISCONTINUED | OUTPATIENT
Start: 2025-06-03 | End: 2025-06-04

## 2025-06-03 RX ORDER — HYDROMORPHONE HYDROCHLORIDE 1 MG/ML
0.2 INJECTION, SOLUTION INTRAMUSCULAR; INTRAVENOUS; SUBCUTANEOUS EVERY 5 MIN PRN
Status: COMPLETED | OUTPATIENT
Start: 2025-06-03 | End: 2025-06-03

## 2025-06-03 RX ORDER — ONDANSETRON HYDROCHLORIDE 2 MG/ML
4 INJECTION, SOLUTION INTRAVENOUS EVERY 8 HOURS PRN
Status: DISCONTINUED | OUTPATIENT
Start: 2025-06-03 | End: 2025-06-06 | Stop reason: HOSPADM

## 2025-06-03 RX ORDER — METHOCARBAMOL 750 MG/1
750 TABLET, FILM COATED ORAL 3 TIMES DAILY
Qty: 42 TABLET | Refills: 0 | Status: SHIPPED | OUTPATIENT
Start: 2025-06-03 | End: 2025-06-06

## 2025-06-03 RX ORDER — HYDROMORPHONE HYDROCHLORIDE 1 MG/ML
INJECTION, SOLUTION INTRAMUSCULAR; INTRAVENOUS; SUBCUTANEOUS
Status: COMPLETED
Start: 2025-06-03 | End: 2025-06-03

## 2025-06-03 RX ORDER — LIDOCAINE HYDROCHLORIDE AND EPINEPHRINE 10; 10 UG/ML; MG/ML
INJECTION, SOLUTION INFILTRATION; PERINEURAL
Status: DISCONTINUED | OUTPATIENT
Start: 2025-06-03 | End: 2025-06-03 | Stop reason: HOSPADM

## 2025-06-03 RX ORDER — MIDAZOLAM HYDROCHLORIDE 1 MG/ML
INJECTION INTRAMUSCULAR; INTRAVENOUS
Status: DISCONTINUED | OUTPATIENT
Start: 2025-06-03 | End: 2025-06-03

## 2025-06-03 RX ORDER — AMLODIPINE BESYLATE 10 MG/1
10 TABLET ORAL DAILY
Status: DISCONTINUED | OUTPATIENT
Start: 2025-06-04 | End: 2025-06-06 | Stop reason: HOSPADM

## 2025-06-03 RX ORDER — OLMESARTAN MEDOXOMIL, AMLODIPINE AND HYDROCHLOROTHIAZIDE TABLET 40/10/25 MG 40; 10; 25 MG/1; MG/1; MG/1
40 TABLET ORAL DAILY
Status: DISCONTINUED | OUTPATIENT
Start: 2025-06-03 | End: 2025-06-03

## 2025-06-03 RX ORDER — OXYCODONE HYDROCHLORIDE 5 MG/1
5 TABLET ORAL
Status: DISCONTINUED | OUTPATIENT
Start: 2025-06-03 | End: 2025-06-03 | Stop reason: HOSPADM

## 2025-06-03 RX ADMIN — CEFAZOLIN 3 G: 330 INJECTION, POWDER, FOR SOLUTION INTRAMUSCULAR; INTRAVENOUS at 07:06

## 2025-06-03 RX ADMIN — CELECOXIB 200 MG: 200 CAPSULE ORAL at 11:06

## 2025-06-03 RX ADMIN — HEPARIN SODIUM 5000 UNITS: 5000 INJECTION INTRAVENOUS; SUBCUTANEOUS at 10:06

## 2025-06-03 RX ADMIN — METHOCARBAMOL 750 MG: 750 TABLET ORAL at 08:06

## 2025-06-03 RX ADMIN — Medication 200 MCG: at 08:06

## 2025-06-03 RX ADMIN — HYDROMORPHONE HYDROCHLORIDE 0.5 MG: 2 INJECTION INTRAMUSCULAR; INTRAVENOUS; SUBCUTANEOUS at 01:06

## 2025-06-03 RX ADMIN — HYDROMORPHONE HYDROCHLORIDE 0.2 MG: 1 INJECTION, SOLUTION INTRAMUSCULAR; INTRAVENOUS; SUBCUTANEOUS at 11:06

## 2025-06-03 RX ADMIN — DEXMEDETOMIDINE 8 MCG: 200 INJECTION, SOLUTION INTRAVENOUS at 09:06

## 2025-06-03 RX ADMIN — LIDOCAINE HYDROCHLORIDE 100 MG: 20 INJECTION, SOLUTION EPIDURAL; INFILTRATION; INTRACAUDAL at 07:06

## 2025-06-03 RX ADMIN — ACETAMINOPHEN 650 MG: 325 TABLET ORAL at 08:06

## 2025-06-03 RX ADMIN — DEXAMETHASONE SODIUM PHOSPHATE 8 MG: 4 INJECTION INTRA-ARTICULAR; INTRALESIONAL; INTRAMUSCULAR; INTRAVENOUS; SOFT TISSUE at 07:06

## 2025-06-03 RX ADMIN — Medication 10 MG: at 09:06

## 2025-06-03 RX ADMIN — FENTANYL CITRATE 100 MCG: 50 INJECTION INTRAMUSCULAR; INTRAVENOUS at 07:06

## 2025-06-03 RX ADMIN — Medication 100 MCG: at 08:06

## 2025-06-03 RX ADMIN — Medication 100 MCG: at 07:06

## 2025-06-03 RX ADMIN — HALOPERIDOL LACTATE 0.5 MG: 5 INJECTION, SOLUTION INTRAMUSCULAR at 11:06

## 2025-06-03 RX ADMIN — POLYETHYLENE GLYCOL 3350 17 G: 17 POWDER, FOR SOLUTION ORAL at 11:06

## 2025-06-03 RX ADMIN — OXYCODONE HYDROCHLORIDE 10 MG: 10 TABLET ORAL at 10:06

## 2025-06-03 RX ADMIN — MUPIROCIN: 20 OINTMENT TOPICAL at 06:06

## 2025-06-03 RX ADMIN — OXYCODONE HYDROCHLORIDE 10 MG: 10 TABLET ORAL at 11:06

## 2025-06-03 RX ADMIN — Medication 10 MG: at 08:06

## 2025-06-03 RX ADMIN — Medication 10 MG: at 07:06

## 2025-06-03 RX ADMIN — CEFAZOLIN 2 G: 2 INJECTION, POWDER, FOR SOLUTION INTRAMUSCULAR; INTRAVENOUS at 11:06

## 2025-06-03 RX ADMIN — SODIUM CHLORIDE: 0.9 INJECTION, SOLUTION INTRAVENOUS at 07:06

## 2025-06-03 RX ADMIN — DEXMEDETOMIDINE 4 MCG: 200 INJECTION, SOLUTION INTRAVENOUS at 08:06

## 2025-06-03 RX ADMIN — PROPOFOL 200 MG: 10 INJECTION, EMULSION INTRAVENOUS at 07:06

## 2025-06-03 RX ADMIN — ACETAMINOPHEN 650 MG: 325 TABLET ORAL at 03:06

## 2025-06-03 RX ADMIN — SODIUM CHLORIDE, SODIUM GLUCONATE, SODIUM ACETATE, POTASSIUM CHLORIDE, MAGNESIUM CHLORIDE, SODIUM PHOSPHATE, DIBASIC, AND POTASSIUM PHOSPHATE: .53; .5; .37; .037; .03; .012; .00082 INJECTION, SOLUTION INTRAVENOUS at 07:06

## 2025-06-03 RX ADMIN — OXYCODONE HYDROCHLORIDE 5 MG: 5 TABLET ORAL at 06:06

## 2025-06-03 RX ADMIN — CEFAZOLIN 2 G: 2 INJECTION, POWDER, FOR SOLUTION INTRAMUSCULAR; INTRAVENOUS at 03:06

## 2025-06-03 RX ADMIN — Medication 100 MCG: at 09:06

## 2025-06-03 RX ADMIN — SODIUM CHLORIDE 0.2 MCG/KG/MIN: 9 INJECTION, SOLUTION INTRAVENOUS at 07:06

## 2025-06-03 RX ADMIN — PHENYLEPHRINE HYDROCHLORIDE 0.5 MCG/KG/MIN: 10 INJECTION INTRAVENOUS at 08:06

## 2025-06-03 RX ADMIN — MIDAZOLAM 2 MG: 1 INJECTION INTRAMUSCULAR; INTRAVENOUS at 07:06

## 2025-06-03 RX ADMIN — HEPARIN SODIUM 5000 UNITS: 5000 INJECTION INTRAVENOUS; SUBCUTANEOUS at 03:06

## 2025-06-03 RX ADMIN — ONDANSETRON 4 MG: 2 INJECTION INTRAMUSCULAR; INTRAVENOUS at 10:06

## 2025-06-03 RX ADMIN — METHOCARBAMOL 500 MG: 500 TABLET ORAL at 11:06

## 2025-06-03 RX ADMIN — Medication 200 MCG: at 07:06

## 2025-06-03 RX ADMIN — ROCURONIUM BROMIDE 100 MG: 10 INJECTION INTRAVENOUS at 07:06

## 2025-06-03 RX ADMIN — SUGAMMADEX 300 MG: 100 INJECTION, SOLUTION INTRAVENOUS at 08:06

## 2025-06-03 RX ADMIN — ACETAMINOPHEN 1000 MG: 500 TABLET ORAL at 06:06

## 2025-06-03 NOTE — PT/OT/SLP EVAL
Physical Therapy Co-Evaluation and Treatment    OT present for coeval due to pt's multiple medical comorbidities and functional/cognition deficits requiring two skilled therapists to appropriately progress pt's musculoskeletal strength, neuromuscular control, and endurance while taking into consideration medical acuity and pt safety.    Patient Name:  Scott Gallagher   MRN:  [id number]    Recommendations:     Discharge Recommendations: High Intensity Therapy (May progress)   Discharge Equipment Recommendations: walker, rolling   Barriers to discharge: None    Assessment:     Scott Gallagher is a 34 y.o. male admitted with a medical diagnosis of Lumbar spondylosis.  He presents with the following impairments/functional limitations: weakness, impaired endurance, impaired self care skills, impaired functional mobility, gait instability, impaired balance, decreased ROM, pain, decreased lower extremity function     Pt receptive and tolerated PT co-eval with OT well. Pt educated on spinal precautions prior to start of treatment with pt verbalizing understanding. OOB activity limited due to increased back pain with mobilization this session. Pt unable to stand due to pain. Patient presents with good participation and motivation to return to prior level of function with high intensity therapy.  The patient demonstrates appropriate strength to participate in up to 3 hours or 15hrs of combined therapy post acute.    Rehab Prognosis: Good; patient would benefit from acute skilled PT services to address these deficits and reach maximum level of function.    Recent Surgery: Procedure(s) (LRB):  FUSION, SPINE, LUMBAR, TLIF, POSTERIOR APPROACH, USING PEDICLE SCREW L4-5 (L) (N/A) * Day of Surgery *    Plan:     During this hospitalization, patient to be seen 4 x/week to address the identified rehab impairments via gait training, therapeutic activities, therapeutic exercises, neuromuscular re-education and progress toward the following  goals:    Plan of Care Expires:  07/03/25    Subjective     Chief Complaint: back pain  Patient/Family Comments/goals:   Pain/Comfort:  Pain Rating 1: 7/10  Location - Orientation 1: lower  Location 1: back  Pain Addressed 1: Reposition, Pre-medicate for activity, Distraction, Cessation of Activity  Pain Rating Post-Intervention 1: 8/10    Patients cultural, spiritual, Druze conflicts given the current situation: no    Patient History:     Living Environment: Pt lives with SO  in [facility] with 8 DAYRON and L HR. Bathroom: tub/shower combo   Prior Level of Function: primarily IND, recently using SPC due to pain and needing assistance to don socks and shoes  DME owned: SPC, toilet raiser  Caregiver Assistance: SO    Additional roles/responsibilities/hobbies:   Driving: Yes  Occpuation: Musician    Objective:     Communicated with RN prior to session.  Patient found supine with marie catheter, hemovac, SCD  upon PT entry to room.    General Precautions: Standard, fall  Orthopedic Precautions:spinal precautions   Braces: N/A  Respiratory Status: Room air    Exams:  Gross Motor Coordination:  WFL  Sensation:    -       Impaired  Pt reports numbness to L foot compared to R foot, reports this was present before surgery as well  RLE ROM: WFL  RLE Strength: grossly 3/5, resistance not tested due to pain  LLE ROM: WFL  LLE Strength: grossly 3/5, resistance not tested due to pain    Functional Mobility:    Bed Mobility:   Rolling: to R with moderate assistance  Supine > Sit: maximal assistance and of 2 persons  Sit > Supine: moderate assistance and of 2 persons    Transfers:   Not performed due to increased pain with movement    Balance:   Sitting balance: FAIR+: Maintains balance through MINIMAL excursions of active trunk motion      AM-PAC 6 CLICK MOBILITY  Total Score:12       Treatment & Education:  Pt educated on tip to reduce fall risk and safety with mobility and using call button for assistance from nursing staff with OOB  mobility.  Pt educated on sitting up in chair throughout most of day  Pt educated on amb 2-3x per day with assistance from staff and increased movement during hospital stay.  All questions answered within the scope of PT.  White board updated accordingly.    Patient left HOB elevated with all lines intact and call button in reach.    GOALS:   Multidisciplinary Problems       Physical Therapy Goals          Problem: Physical Therapy    Goal Priority Disciplines Outcome Interventions   Physical Therapy Goal     PT, PT/OT Progressing    Description: Goals to be met by: 7/3/25     Patient will increase functional independence with mobility by performin. Supine to sit with Set-up [place]  2. Rolling to Left and Right with Set-up Assistance.  3. Sit to stand transfer with Supervision  4. Bed to chair transfer with Supervision using LRAD or no AD  5. Gait  x 150 feet with Supervision using LRAD or no AD.   6. Ascend/descend 8 stair with left Handrails Contact Guard Assistance using LRAD or no AD.                          DME Justifications:   Scott's mobility limitation cannot be sufficiently resolved by the use of a cane. His functional mobility deficit can be sufficiently resolved with the use of a Rolling Walker. Patient's mobility limitation significantly impairs their ability to participate in one of more activities of daily living.  The use of a RW will significantly improve the patient's ability to participate in MRADLS and the patient will use it on regular basis in the home.    History:     Past Medical History:   Diagnosis Date    Hypertension     Stroke 2021    Stroke        Past Surgical History:   Procedure Laterality Date    APPENDECTOMY      COLONOSCOPY N/A 2024    Procedure: COLONOSCOPY;  Surgeon: Rahul Valentine MD;  Location: [address];  Service: Endoscopy;  Laterality: N/A;    DIGITAL RECTAL EXAMINATION UNDER ANESTHESIA N/A 3/21/2025    Procedure: Exam under anesthesia,  digital rectum fistulotomy;  Surgeon: Sun Finley MD;  Location: [facility] OR;  Service: Colon and Rectal;  Laterality: N/A;    EPIDURAL STEROID INJECTION N/A 7/21/2023    Procedure: L4-5 LESI (toward the left);  Surgeon: Dom Jha DO;  Location: [facility];  Service: Pain Management;  Laterality: N/A;  oral sedation    EPIDURAL STEROID INJECTION INTO LUMBAR SPINE N/A 4/23/2024    Procedure: L4-5 Lumbar SUSANNA (toward the left - long needle);  Surgeon: Dom Jha DO;  Location: [facility];  Service: Pain Management;  Laterality: N/A;  15mins-Oral Sed    ESOPHAGOGASTRODUODENOSCOPY N/A 9/5/2024    Procedure: EGD (ESOPHAGOGASTRODUODENOSCOPY);  Surgeon: Rahul Valentine MD;  Location: [address]);  Service: Endoscopy;  Laterality: N/A;  Referred by: Dr. 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, golytely, instructions to portal-Kpvt  8/28 pre call attempted, no answer-LVM  9/4-pt cannot come in earlier than 12:15pm, precall complete-Kpvt    FISTULOTOMY  3/21/2025    Procedure: FISTULOTOMY, ANAL;  Surgeon: Sun Finley MD;  Location: [facility];  Service: Colon and Rectal;;    INJECTION, SPINE, LUMBOSACRAL, TRANSFORAMINAL APPROACH Left 3/27/2025    Procedure: TFESI LT L4-5;  Surgeon: Johan Marion MD;  Location: [facility];  Service: Pain Management;  Laterality: Left;  no ac    INJECTION, SPINE, LUMBOSACRAL, TRANSFORAMINAL APPROACH Left 4/28/2025    Procedure: Left L4/5 and L5/S1 TFESI;  Surgeon: Johan Marion MD;  Location: [facility];  Service: Pain Management;  Laterality: Left;  no AC    LUMBAR LAMINECTOMY WITH DISCECTOMY N/A 9/30/2024    Procedure: LAMINECTOMY, SPINE, LUMBAR, WITH DISCECTOMY L4-5 (L) TREVOR RETRACTORS SNS: SSEP/EMG;  Surgeon: Raleigh Bond MD;  Location: [facility];  Service: Orthopedics;  Laterality: N/A;    WISDOM TOOTH EXTRACTION  2011       Time Tracking:     PT Received On: 06/03/25  PT Start Time: 1334     PT  Stop Time: 1358  PT Total Time (min): 24 min     Billable Minutes: Evaluation 10 and Therapeutic Activity 14      06/03/2025

## 2025-06-03 NOTE — NURSING
Pt transferred via bed. Alert and oriented x4. VS stable. Pain 6/10. SCDs are on. Medication and diet reviewed. Questions answered. Cunningham catheter in place.

## 2025-06-03 NOTE — HPI
Scott Gallagher is a 34 y.o. male w/ a hx of hemorrhagic stroke (May 2021) with 4 episodes of focal dystonia (left hand and speech when it occurs) who returns to me today for FU. Patient continues to have LBP that radiates down LLE. Patient has been taking meds and doing exercises without much relief. He is miserable and wants surgical intervention.     The patient does not smoke, have DM or endorse IVDU. The patient is not on any blood thinners and does not take chronic narcotics. 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

## 2025-06-03 NOTE — PLAN OF CARE
OT eval complete. OT POC and goals established.   Problem: Occupational Therapy  Goal: Occupational Therapy Goal  Description: Goals to be met by: 7/3/25     Patient will increase functional independence with ADLs by performing:    LE Dressing with Modified Bradley and AE prn.  Grooming while standing at sink with Modified Bradley.  Toileting from toilet/bedside commode with Modified Bradley for hygiene and clothing management.   Supine to sit with Modified Bradley.  Toilet transfer to toilet/bedside commode with Modified Bradley.    Outcome: Progressing

## 2025-06-03 NOTE — PLAN OF CARE
PT eval completed- see note for details, goals and POC established.     Problem: Physical Therapy  Goal: Physical Therapy Goal  Description: Goals to be met by: 7/3/25     Patient will increase functional independence with mobility by performin. Supine to sit with Set-up [place]  2. Rolling to Left and Right with Set-up Assistance.  3. Sit to stand transfer with Supervision  4. Bed to chair transfer with Supervision using LRAD or no AD  5. Gait  x 150 feet with Supervision using LRAD or no AD.   6. Ascend/descend 8 stair with left Handrails Contact Guard Assistance using LRAD or no AD.     Outcome: Progressing   6/3/2025

## 2025-06-03 NOTE — NURSING TRANSFER
Nursing Transfer Note      6/3/2025   1:11 PM    Nurse giving handoff:CLAUDIA Yang RN   Nurse receiving handoff:DENILSON Echevarria RN     Reason patient is being transferred: post procedure     Transfer To: 540    Transfer via bed    Transported by PCT     Additional Lines: Cunningham Catheter    Any special needs or follow-up needed: routine     Chart send with patient: Yes    Notified: significant other     Patient reassessed at: 1245 on 6/3

## 2025-06-03 NOTE — DISCHARGE INSTRUCTIONS
DR. IOANA SWANSON'S POSTOPERATIVE INSTRUCTIONS -   LUMBAR FUSION       Antibiotics: You do not need additional antibiotics at home.    NSAIDs: Please refrain from taking ibuprofen (Advil), naproxen (Aleve), and other non steroidal anti-inflammatory medications other than the Celebrex that will be prescribed to you after surgery.    Wound Care: You may remove your dressing and shower 7 days after surgery. Until then please keep your wound clean and dry. Sponge baths are acceptable. Do not go in a pool or hot tub until seen in clinic. Please leave the small steri-strips covering your wound in place until they fall off naturally (2 weeks). You may notice clear suture ends hanging from the sides of your incision after the steri-strips are removed, it is ok to clip these with scissors.    Brace: You may be prescribed a brace, please wear this when up and walking, it is not necessary to wear at night when sleeping.    Pain: We will use a multimodal approach for pain management after your surgery.  You will be given a prescription for pain medicine when you are discharged from the hospital.  You will also be given prescriptions for Robaxin (a muscle relaxer), Gabapentin, Celebrex and Tylenol.  Please note: you will only be given ONE prescription for narcotics when you are discharged from the hospital.  This medication is for breakthrough pain only. This medication will not be refilled.  The other medications given to you may be refilled if needed.      Infection: Signs of infection include increasing wound drainage and redness around the wound, as well as a temperature over 101.5 degrees. It is unnecessary to take your temperature on a routine basis. Please call the below number if you are concerned about an infection.    Driving and Work: It is ok to return to driving and work as long as you are not taking narcotic pain medications and can walk greater than 100 feet. Please do not lift over 10 pounds or participate in  exercise or sports until cleared by Dr. Bond.    Deep Venous Thrombosis (Blood Clots): Symptoms include swelling in the legs and shortness of breath. Please call the office or proceed to the nearest emergency room if you have any of these symptoms.    Physical Therapy: The best physical therapy immediately after surgery is walking. Please try to walk as much as possible.    Follow-up: You will be scheduled for a follow-up appointment in 4 weeks with either Dr. Bond or his physician assistant, Lupe Rodriguez PA-C.    Questions: During business hours please call [phone] for routine questions. For after hours questions please call [phone] and ask to speak with the Orthopaedic resident on call.    Disability: If you submitted short term disability paperwork for us to complete and would like to check the status, please call the Disability Department at [phone].  You may fax any necessary paperwork to [phone].

## 2025-06-03 NOTE — PT/OT/SLP EVAL
Occupational Therapy   Co-Evaluation  Co-treatment performed due to patient's multiple deficits requiring two skilled therapists to appropriately and safely assess patient's strength and endurance while facilitating functional tasks in addition to accommodating for patient's activity tolerance.      Name: Scott Gallagher  MRN: [id number]  Admitting Diagnosis: Lumbar spondylosis  Recent Surgery: Procedure(s) (LRB):  FUSION, SPINE, LUMBAR, TLIF, POSTERIOR APPROACH, USING PEDICLE SCREW L4-5 (L) (N/A) * Day of Surgery *    Recommendations:     Discharge Recommendations: High Intensity Therapy (may progress)  Discharge Equipment Recommendations:  walker, rolling  Barriers to discharge:  Inaccessible home environment    Assessment:     Scott Gallagher is a 34 y.o. male with a medical diagnosis of Lumbar spondylosis.  Pt agreeable to therapy however limited by pain ad decreased activity tolerance. Pt and S.O. educated on spinal precautions with good carryover noted. Performance deficits affecting function: weakness, impaired endurance, impaired self care skills, impaired functional mobility, gait instability, impaired balance, decreased coordination, decreased lower extremity function, decreased safety awareness, pain, decreased ROM, orthopedic precautions. Patient has demonstrated sufficient progression to warrant high intensity therapy evidenced by objectives noted below.     Rehab Prognosis: Good; patient would benefit from acute skilled OT services to address these deficits and reach maximum level of function.       Plan:     Patient to be seen 4 x/week to address the above listed problems via self-care/home management, therapeutic activities, therapeutic exercises, neuromuscular re-education  Plan of Care Expires: 07/03/25  Plan of Care Reviewed with: patient, significant other    Subjective     Chief Complaint: LBP  Patient/Family Comments/goals: to return to PLOF    Occupational Profile:  Living Environment: Pt lives with  his S.O. in a SSH with 8 DAYRON and L HR. T/s combo  Previous level of function: (A) required for LBD 2/2 LBP, otherwise Mod (I) with ADLs and fx'l mobility using a SPC  Roles and Routines: (+) drives; is in the orchestra   Equipment Used at Home: cane, straight, other (see comments) (toilet riser)  Assistance upon Discharge: S.O.    Pain/Comfort:  Pain Rating 1: 7/10  Location - Orientation 1: lower  Location 1: back  Pain Addressed 1: Pre-medicate for activity, Reposition, Distraction, Cessation of Activity  Pain Rating Post-Intervention 1: 8/10    Patients cultural, spiritual, Orthodox conflicts given the current situation: no    Objective:   Additional staff present: CHRISTINE Cohen    Communicated with: RN prior to session.  Patient found supine with marie catheter, hemovac, SCD upon OT entry to room.    General Precautions: Standard, fall  Orthopedic Precautions: spinal precautions  Braces: N/A  Respiratory Status: Room air    Occupational Performance:    Bed Mobility:    Patient completed Rolling/Turning to Right with moderate assistance  Patient completed Supine to Sit with maximal assistance and 2 persons  Patient completed Sit to Supine with moderate assistance and 2 persons    Functional Mobility/Transfers:  STS t/f deferred by pt 2/2 pain    Activities of Daily Living:  Upper Body Dressing: minimum assistance to pull gown over shoulders at EOB    Cognitive/Visual Perceptual:  Cognitive/Psychosocial Skills:     -       Oriented to: Person, Place, Time, and Situation   -       Follows Commands/attention:Follows multistep  commands  -       Safety awareness/insight to disability: impaired   -       Mood/Affect/Coping skills/emotional control: Cooperative    Physical Exam:  Sensation:    -       Intact  Upper Extremity Range of Motion:     -       Right Upper Extremity: WFL  -       Left Upper Extremity: WFL  Upper Extremity Strength:    -       Right Upper Extremity: WFL  -       Left Upper Extremity:  WFL    [facility] 6 Click ADL:  AMPAC Total Score: 16    Treatment & Education:  -Education provided regarding spinal precautions for use during functional tasks/mobility/transfers   -Education on energy conservation and task modification to maximize safety and (I) during ADLs and mobility  -Education on importance of OOB activity to improve overall activity tolerance and promote recovery  -Pt educated to call for assistance and to transfer with hospital staff only  -Provided education regarding role of OT, POC, & discharge recommendations with pt and S.O. verbalizing understanding.  Pt had no further questions & when asked whether there were any concerns pt reported none.     Patient left HOB elevated with all lines intact, call button in reach, RN notified, and S.O. present    GOALS:   Multidisciplinary Problems       Occupational Therapy Goals          Problem: Occupational Therapy    Goal Priority Disciplines Outcome Interventions   Occupational Therapy Goal     OT, PT/OT Progressing    Description: Goals to be met by: 7/3/25     Patient will increase functional independence with ADLs by performing:    LE Dressing with Modified [place] and AE prn.  Grooming while standing at sink with Modified [place].  Toileting from toilet/bedside commode with Modified [place] for hygiene and clothing management.   Supine to sit with Modified [place].  Toilet transfer to toilet/bedside commode with Modified [place].                         DME Justifications:   Scott's mobility limitation cannot be sufficiently resolved by the use of a cane. His functional mobility deficit can be sufficiently resolved with the use of a Rolling Walker. Patient's mobility limitation significantly impairs their ability to participate in one of more activities of daily living.  The use of a RW will significantly improve the patient's ability to participate in MRADLS and the patient will use it on regular basis in the  home.    History:     Past Medical History:   Diagnosis Date    Hypertension     Stroke 05/2021    Stroke          Past Surgical History:   Procedure Laterality Date    APPENDECTOMY  2004    COLONOSCOPY N/A 9/5/2024    Procedure: COLONOSCOPY;  Surgeon: Rahul Valentine MD;  Location: [facility] ([address] [place] [address]);  Service: Endoscopy;  Laterality: N/A;    DIGITAL RECTAL EXAMINATION UNDER ANESTHESIA N/A 3/21/2025    Procedure: Exam under anesthesia, digital rectum fistulotomy;  Surgeon: Sun Finley MD;  Location: [facility] OR;  Service: Colon and Rectal;  Laterality: N/A;    EPIDURAL STEROID INJECTION N/A 7/21/2023    Procedure: L4-5 LESI (toward the left);  Surgeon: Dom Jha DO;  Location: [facility];  Service: Pain Management;  Laterality: N/A;  oral sedation    EPIDURAL STEROID INJECTION INTO LUMBAR SPINE N/A 4/23/2024    Procedure: L4-5 Lumbar SUSANNA (toward the left - long needle);  Surgeon: Dom Jha DO;  Location: [facility];  Service: Pain Management;  Laterality: N/A;  15mins-Oral Sed    ESOPHAGOGASTRODUODENOSCOPY N/A 9/5/2024    Procedure: EGD (ESOPHAGOGASTRODUODENOSCOPY);  Surgeon: Rahul Valentine MD;  Location: [facility] ([address] [place] [address]);  Service: Endoscopy;  Laterality: N/A;  Referred by: Dr. 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, golytely, instructions to portal-Kpvt  8/28 pre call attempted, no answer-LVM  9/4-pt cannot come in earlier than 12:15pm, precall complete-Kpvt    FISTULOTOMY  3/21/2025    Procedure: FISTULOTOMY, ANAL;  Surgeon: Sun Finley MD;  Location: [facility] OR;  Service: Colon and Rectal;;    INJECTION, SPINE, LUMBOSACRAL, TRANSFORAMINAL APPROACH Left 3/27/2025    Procedure: TFESI LT L4-5;  Surgeon: Johan Marion MD;  Location: [facility];  Service: Pain Management;  Laterality: Left;  no ac    INJECTION, SPINE, LUMBOSACRAL, TRANSFORAMINAL APPROACH Left 4/28/2025    Procedure: Left L4/5 and L5/S1 TFESI;  Surgeon: Johan Marion,  MD;  Location: [facility];  Service: Pain Management;  Laterality: Left;  no AC    LUMBAR LAMINECTOMY WITH DISCECTOMY N/A 9/30/2024    Procedure: LAMINECTOMY, SPINE, LUMBAR, WITH DISCECTOMY L4-5 (L) TREVOR RETRACTORS SNS: SSEP/EMG;  Surgeon: Raleigh Bond MD;  Location: [facility];  Service: Orthopedics;  Laterality: N/A;    WISDOM TOOTH EXTRACTION  2011       Time Tracking:     OT Date of Treatment: 06/03/25  OT Start Time: 1334  OT Stop Time: 1358  OT Total Time (min): 24 min    Billable Minutes:Evaluation 12  Neuromuscular Re-education 12    6/3/2025

## 2025-06-03 NOTE — SUBJECTIVE & OBJECTIVE
Principal Problem:Lumbar spondylosis    Principal Orthopedic Problem: s/p L4-5 TLIF 6/3    Interval History: Overnight events: No issues. Pain only moderately well controlled. Endorses 10-20% improvement to LLE radic compared to preop. Tolerating PO intake. Cunningham out this morning. No other complaints.     Vitals: VSS on RA.     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       Current Facility-Administered Medications   Medication    ceFAZolin 2 g    LIDOcaine-EPINEPHrine 1%-1:100,000 injection    mupirocin 2 % ointment    thrombin (bovine) solution    vancomycin injection     Facility-Administered Medications Ordered in Other Encounters   Medication    ceFAZolin injection    dexAMETHasone injection    dexmedeTOMIDine injection    fentaNYL 50 mcg/mL injection    isolyte infusion    ketamine in 0.9 % sod chloride 50 mg/5 mL (10 mg/mL) injection    LIDOcaine (PF) 20 mg/ml (2%) injection    midazolam (VERSED) 1 mg/mL injection    ondansetron injection    PHENYLephrine 20 mg in 0.9% NaCl 250 mL infusion    PHENYLephrine HCl in 0.9% NaCl 1 mg/10 mL (100 mcg/mL)    propofol (DIPRIVAN) 10 mg/mL infusion    remifentaniL (ULTIVA) 2 mg in 0.9% NaCl 40 mL infusion    rocuronium injection    sodium chloride 0.9% infusion    sugammadex (BRIDION) 100 mg/mL injection     Objective:     Vital Signs (Most Recent):  Temp: 97.8 °F (36.6 °C) (06/03/25 0622)  Pulse: 72 (06/03/25 0605)  Resp: 20 (06/03/25 0605)  BP: 122/79 (06/03/25 0608)  SpO2: 97 % (06/03/25 0605) Vital Signs (24h Range):  Temp:  [97.8 °F (36.6 °C)] 97.8 °F (36.6 °C)  Pulse:  [72-78] 72  Resp:  [20] 20  SpO2:  [97 %] 97 %  BP: (122)/(79) 122/79     Weight: 132 kg (291 lb)  Height: 6'  "3" (190.5 cm)  Body mass index is 36.37 kg/m².      Intake/Output Summary (Last 24 hours) at 6/3/2025 1040  Last data filed at 6/3/2025 1018  Gross per 24 hour   Intake 1000 ml   Output 100 ml   Net 900 ml        Ortho/SPM Exam  Awake/alert/oriented x3, No acute distress, Afebrile, Vital signs stable  Normocephalic, Atraumatic  Good inspiratory effort with unlaboured breathing  Dressing is clean/dry/intact    Motor            RIGHT  LEFT  Hip Flexion (L3)                                  5/5   5/5  Knee Extension (L4)                           5/5   5/5  EHL (L5)                                             5/5   5/5  Gastrocs (S1)                                     5/5   5/5  Peroneals (S1)                                   5/5   5/5       FHL (S2)                                             5/5   5/5         Significant Labs: CBC: No results for input(s): "WBC", "HGB", "HCT", "PLT" in the last 48 hours.  AM labs still pending collection     Significant Imaging: I have reviewed and interpreted all pertinent imaging results/findings.  "

## 2025-06-03 NOTE — ASSESSMENT & PLAN NOTE
Scott Gallagher is a 34 y.o. male s/p L4-5 TLIF on 06/03/2025    Pain control: multimodal regimen  PT/OT: WBAT with spine precautions   DVT PPx:  heparin 5k tid  Abx: 24hr post op joshuaef  Octavio:  Tha POD1  Drain:  output   F/u: 4 wk post op appt    Dispo: PT/OT eval today

## 2025-06-03 NOTE — H&P
"DATE: 6/3/2025  PATIENT: Scott Gallagher    Attending Physician: Raleigh Bond M.D.    9/30/24: L4-5 microdiscectomy    HISTORY:  Scott Gallagher is a 34 y.o. male w/ a hx of hemorrhagic stroke (May 2021) with 4 episodes of focal dystonia (left hand and speech when it occurs) who returns to me today for FU. Patient continues to have LBP that radiates down LLE. Patient has been taking meds and doing exercises without much relief. He is miserable and wants surgical intervention.    The patient does not smoke, have DM or endorse IVDU. The patient is not on any blood thinners and does not take chronic narcotics. 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    PMH/PSH/FamHx/SocHx:  Unchanged from prior visit    ROS:  Positive for LBP and LLE pain  Denies perineal paresthesias, bowel or bladder incontinence    EXAM:  /79   Pulse 72   Temp 97.8 °F (36.6 °C)   Resp 20   Ht 6' 3" (1.905 m)   Wt 132 kg (291 lb)   SpO2 97%   BMI 36.37 kg/m²     My physical examination was notable for the following findings: motor intact BLE; SILT    IMAGING:  Today I independently reviewed the following images and my interpretations are as follows:    Previous L-spine XRs showed spondylosis and DDD.    Lumbar MRI showed L4-5 recurrent HNP with moderate stenosis. L4 laminectomy    ASSESSMENT/PLAN:  Patient has recurrent lumbar HNP with LLE radiculopathy, in the setting of prior L4-5 microdiscectomy. Will plan for L4-5 PLDF/TLIF (L).    Raleigh Bond MD  Orthopaedic Spine Surgeon  Department of Orthopaedic Surgery  [phone]  "

## 2025-06-03 NOTE — PLAN OF CARE
Problem: Adult Inpatient Plan of Care  Goal: Plan of Care Review  Outcome: Progressing  Goal: Patient-Specific Goal (Individualized)  Outcome: Progressing  Goal: Absence of Hospital-Acquired Illness or Injury  Outcome: Progressing  Goal: Optimal Comfort and Wellbeing  Outcome: Progressing  Goal: Readiness for Transition of Care  Outcome: Progressing     Problem: Wound  Goal: Optimal Coping  Outcome: Progressing  Goal: Optimal Functional Ability  Outcome: Progressing  Goal: Absence of Infection Signs and Symptoms  Outcome: Progressing  Goal: Improved Oral Intake  Outcome: Progressing  Goal: Optimal Pain Control and Function  Outcome: Progressing  Goal: Skin Health and Integrity  Outcome: Progressing  Goal: Optimal Wound Healing  Outcome: Progressing     Problem: Infection  Goal: Absence of Infection Signs and Symptoms  Outcome: Progressing     Pt resting comfortably in bed. SCDs are on. PIV saline locked. VS are stable. Call light and personal items are within reach. Pain controlled with PRN medications. No distress noted at this time.

## 2025-06-03 NOTE — OP NOTE
DATE OF PROCEDURE: 6/3/2025.     SURGEON: Raleigh Bond M.D.     FIRST ASSISTANT: Buddy Frank MD, PGY-5 Orthopedics     PREOPERATIVE DIAGNOSIS:   1.   L4-5 spondylosis  2.   L4-5 disc calcification and re-herniation  3.   Symptomatic lumbar spinal stenosis lumbar radiculopathy  4.   History of L4-5 decompression/discectomy     POSTOPERATIVE DIAGNOSIS:   Same     PROCEDURES PERFORMED:  Posterior lateral and posterior lumbar interbody fusion L4-5  2.   Posterior nonsegmental instrumentation L4-5  3.   Revision L4 laminectomy, and bilateral foraminotomy for decompression of central and bilateral foraminal stenosis.  4.   Application of intervertebral spacer device to L4-5 disc defect  5.   Robotic assistance for placement of the pedicle screws (requiring greater than 30 minutes of preoperative planning time)  6.   Auto and allografting  7.   Bilateral paraspinal advancement flap 10 cm x 4 cm      ANESTHESIA: General endotracheal anesthesia.     ESTIMATED BLOOD LOSS: 100 mL.     IMPLANTS: Globus screws, and static 14mm height (15deg lordosis, 26mm length) 3D-printed Ti cage.  DBM putty      SPECIMENS: None.     FINDINGS: L severe lateral recess stenosis with re-herniation.     DRAINS: One deep and one superficial HV drains     COMPLICATIONS: None.     SPONGE AND NEEDLE COUNT: Correct x2.     NEUROLOGICAL MONITORING: Somatosensory potentials, free run EMG, and EMG stimulation lumbar pedicle screws. There were no changes to SSEP baselines. There no significant EMG activity. All screws stimulated at or greater than 14 milliamps except for R L4 (9mA). The R L4 screw was removed and the tract was palpated; no breach was noted. The screw was re-inserted.     REASON FOR OPERATION AND BRIEF HISTORY AND PHYSICAL: Scott Gallagher is a 34 y.o. male with a hx of L4-5 decompression and discectomy presented with an L4-5 spondylosis, and refractory lumbar spinal stenosis with radiculopathy. He has failed extensive conservative  management and is here for surgery today.     DESCRIPTION OF INFORMED CONSENT: Please see my last clinic note for a full description of the informed consent I had with the patient.     DESCRIPTION OF PROCEDURE: The patient was met in the preoperative area where all final questions were answered. Their lumbar spine was marked as the operative site. The patient was then brought to the operating room where anesthesia was induced. The patient was then flipped prone onto the [place] spine table. All bony prominences were padded, paying special attention to the eyes, nose and mouth, axillae, ulnar nerve and hands, genitalia, knees and feet, this was confirmed to be adequate with the anesthesia team. Sequential compression devices were run throughout the case on the bilateral calves. The surgical field was prepped and draped in normal sterile manner after using fluoroscopy to localize our incisoin. A full time-out was then called, verifying patient's identity, surgery, site, and that they had been administered a weight appropriate dose of Ancef, no specific nursing, anesthetic or surgical concerns were identified and it was decided that it was safe to proceed with surgery. I informed all members of the operative team that they were empowered to speak up regarding concerns at any time during the procedure.     I extended the previous incision and carried dissection down through the skin and subcutaneous tissues using combination of sharp dissection and electrocautery where necessary. The dorsal fascia of the lumbar spine was identified and incised in the midline and I performed a preliminary subperiosteal exposure of the bilateral L3-4 and L4-5 facet joints, and what I took to be the L4 and L5 transverse processes bilaterally. At the conclusion my preliminary exposure, I placed a marker on what I took to be the left L5 pedicle, took a lateral radiograph, and this confirmed my levels.  Next I finalized my exposure.  Subsequently I performed bilateral L4-5 facetectomies with an osteotome.     We placed a pin in the right posterior superior iliac spine for the robotic array. AP and lateral fluoroscopy images were taken to register to the preoperative CT scan and plan that we had performed prior to the incision.  Once that was done, the robotic arm was used to cannulate pedicle screw tracts bilaterally at L4 and L5. Xrays confirmed adequate positioning of the screws on AP and lateral images.     I then began my neurological decompression. High-speed drill was used to thin the L4 lamina at the level of the pars. This was then removed on block, and I released the ligamentum flavum centrally with a forward angle curette and resected it with a Kerrison punch. There was significant epidural scarring, so care was taken to avoid a durotomy. I subsequently performed a central as well as bilateral foraminal decompression with a Kerrison punch. Working on the left of midline I used a osteotome to remove the superior articular process of L5. I then performed a subtotal L4-5 diskectomy in a standard fashion. The disc space was then thoroughly irrigated and a static 14mm height 3D-printed Ti spacer was impacted, and confirmed to be at the correct level in adequate position radiographically. The disc space then packed with morselized bone graft.     The wound was then copiously irrigated. Rods were measured, cut, contoured, and locked into place with torque wrench. Morselized bone graft, mixed with 1 g vancomycin powder, was laid dorsally along the decorticated transverse processes from L4-5.     500 mg of vancomycin powder was placed in the deep space, and a deep drain was then placed. Bilateral paraspinal muscles were mobilized based on lateral perforators and advanced to obliterate the dead space at midline. The deep fascia was then created 10 cm in length by 4 cm in width in order to create a tension free water-tight closure and to assist  with wound healing. The deep fascia was then closed with #1 Vicryl sutures in an interrupted, figure-of-eight fashion. A superficial drain was then placed. The superficial layer was then irrigated and closed over an additional 500 mg of vancomycin powder with 2-0 Vicryl, 3-0 Monocryl, Dermabond and Staples. A soft dressing was then placed. The drains were secured in place with silk sutures. The patient was then flipped supine, extubated and transferred to the Recovery Room in stable condition.    Justification of -22 Modifier: This was a more complex case that usual given the patient's morbid obesity and deep tissue, as well as the revision nature and amount of epidural scarring. This made all aspects of the surgery more difficult, from exposure to discectomy and instrumentation.    Raleigh Bond MD  Orthopaedic Spine Surgeon  Department of Orthopaedic Surgery  [phone]

## 2025-06-04 LAB
ABSOLUTE EOSINOPHIL (OHS): 0.02 K/UL
ABSOLUTE MONOCYTE (OHS): 0.74 K/UL (ref 0.3–1)
ABSOLUTE NEUTROPHIL COUNT (OHS): 11.28 K/UL (ref 1.8–7.7)
ANION GAP (OHS): 9 MMOL/L (ref 8–16)
BASOPHILS # BLD AUTO: 0.03 K/UL
BASOPHILS NFR BLD AUTO: 0.2 %
BUN SERPL-MCNC: 12 MG/DL (ref 6–20)
CALCIUM SERPL-MCNC: 8.9 MG/DL (ref 8.7–10.5)
CHLORIDE SERPL-SCNC: 107 MMOL/L (ref 95–110)
CO2 SERPL-SCNC: 20 MMOL/L (ref 23–29)
CREAT SERPL-MCNC: 0.7 MG/DL (ref 0.5–1.4)
ERYTHROCYTE [DISTWIDTH] IN BLOOD BY AUTOMATED COUNT: 11.9 % (ref 11.5–14.5)
GFR SERPLBLD CREATININE-BSD FMLA CKD-EPI: >60 ML/MIN/1.73/M2
GLUCOSE SERPL-MCNC: 142 MG/DL (ref 70–110)
HCT VFR BLD AUTO: 43.6 % (ref 40–54)
HGB BLD-MCNC: 14.8 GM/DL (ref 14–18)
IMM GRANULOCYTES # BLD AUTO: 0.08 K/UL (ref 0–0.04)
IMM GRANULOCYTES NFR BLD AUTO: 0.6 % (ref 0–0.5)
LYMPHOCYTES # BLD AUTO: 2.3 K/UL (ref 1–4.8)
MCH RBC QN AUTO: 30.1 PG (ref 27–31)
MCHC RBC AUTO-ENTMCNC: 33.9 G/DL (ref 32–36)
MCV RBC AUTO: 89 FL (ref 82–98)
NUCLEATED RBC (/100WBC) (OHS): 0 /100 WBC
PLATELET # BLD AUTO: 265 K/UL (ref 150–450)
PMV BLD AUTO: 10.6 FL (ref 9.2–12.9)
POTASSIUM SERPL-SCNC: 3.5 MMOL/L (ref 3.5–5.1)
RBC # BLD AUTO: 4.92 M/UL (ref 4.6–6.2)
RELATIVE EOSINOPHIL (OHS): 0.1 %
RELATIVE LYMPHOCYTE (OHS): 15.9 % (ref 18–48)
RELATIVE MONOCYTE (OHS): 5.1 % (ref 4–15)
RELATIVE NEUTROPHIL (OHS): 78.1 % (ref 38–73)
SODIUM SERPL-SCNC: 136 MMOL/L (ref 136–145)
WBC # BLD AUTO: 14.45 K/UL (ref 3.9–12.7)

## 2025-06-04 PROCEDURE — 97535 SELF CARE MNGMENT TRAINING: CPT

## 2025-06-04 PROCEDURE — 25000003 PHARM REV CODE 250: Performed by: STUDENT IN AN ORGANIZED HEALTH CARE EDUCATION/TRAINING PROGRAM

## 2025-06-04 PROCEDURE — 63600175 PHARM REV CODE 636 W HCPCS: Performed by: ORTHOPAEDIC SURGERY

## 2025-06-04 PROCEDURE — 85025 COMPLETE CBC W/AUTO DIFF WBC: CPT | Performed by: STUDENT IN AN ORGANIZED HEALTH CARE EDUCATION/TRAINING PROGRAM

## 2025-06-04 PROCEDURE — 97530 THERAPEUTIC ACTIVITIES: CPT

## 2025-06-04 PROCEDURE — 97530 THERAPEUTIC ACTIVITIES: CPT | Mod: CQ

## 2025-06-04 PROCEDURE — 36415 COLL VENOUS BLD VENIPUNCTURE: CPT | Performed by: STUDENT IN AN ORGANIZED HEALTH CARE EDUCATION/TRAINING PROGRAM

## 2025-06-04 PROCEDURE — 80048 BASIC METABOLIC PNL TOTAL CA: CPT | Performed by: STUDENT IN AN ORGANIZED HEALTH CARE EDUCATION/TRAINING PROGRAM

## 2025-06-04 PROCEDURE — 97116 GAIT TRAINING THERAPY: CPT | Mod: CQ

## 2025-06-04 PROCEDURE — 11000001 HC ACUTE MED/SURG PRIVATE ROOM

## 2025-06-04 PROCEDURE — 97112 NEUROMUSCULAR REEDUCATION: CPT | Mod: CQ

## 2025-06-04 PROCEDURE — 63600175 PHARM REV CODE 636 W HCPCS: Performed by: STUDENT IN AN ORGANIZED HEALTH CARE EDUCATION/TRAINING PROGRAM

## 2025-06-04 RX ORDER — ACETAMINOPHEN 500 MG
1000 TABLET ORAL EVERY 8 HOURS
Status: DISCONTINUED | OUTPATIENT
Start: 2025-06-04 | End: 2025-06-06 | Stop reason: HOSPADM

## 2025-06-04 RX ORDER — METHOCARBAMOL 500 MG/1
1000 TABLET, FILM COATED ORAL 3 TIMES DAILY
Status: DISCONTINUED | OUTPATIENT
Start: 2025-06-04 | End: 2025-06-06 | Stop reason: HOSPADM

## 2025-06-04 RX ORDER — OXYCODONE HYDROCHLORIDE 10 MG/1
10 TABLET ORAL
Status: DISCONTINUED | OUTPATIENT
Start: 2025-06-04 | End: 2025-06-06 | Stop reason: HOSPADM

## 2025-06-04 RX ORDER — OXYCODONE HYDROCHLORIDE 5 MG/1
5 TABLET ORAL
Status: DISCONTINUED | OUTPATIENT
Start: 2025-06-04 | End: 2025-06-06 | Stop reason: HOSPADM

## 2025-06-04 RX ADMIN — OXYCODONE HYDROCHLORIDE 10 MG: 10 TABLET ORAL at 05:06

## 2025-06-04 RX ADMIN — METHOCARBAMOL 750 MG: 750 TABLET ORAL at 11:06

## 2025-06-04 RX ADMIN — OXYCODONE HYDROCHLORIDE 10 MG: 10 TABLET ORAL at 02:06

## 2025-06-04 RX ADMIN — HEPARIN SODIUM 5000 UNITS: 5000 INJECTION INTRAVENOUS; SUBCUTANEOUS at 02:06

## 2025-06-04 RX ADMIN — ACETAMINOPHEN 650 MG: 325 TABLET ORAL at 02:06

## 2025-06-04 RX ADMIN — HEPARIN SODIUM 5000 UNITS: 5000 INJECTION INTRAVENOUS; SUBCUTANEOUS at 06:06

## 2025-06-04 RX ADMIN — ACETAMINOPHEN 1000 MG: 500 TABLET ORAL at 08:06

## 2025-06-04 RX ADMIN — OXYCODONE HYDROCHLORIDE 10 MG: 10 TABLET ORAL at 11:06

## 2025-06-04 RX ADMIN — ACETAMINOPHEN 650 MG: 325 TABLET ORAL at 06:06

## 2025-06-04 RX ADMIN — METHOCARBAMOL 1000 MG: 500 TABLET ORAL at 08:06

## 2025-06-04 RX ADMIN — OXYCODONE HYDROCHLORIDE 10 MG: 10 TABLET ORAL at 09:06

## 2025-06-04 RX ADMIN — LOSARTAN POTASSIUM AND HYDROCHLOROTHIAZIDE 1 TABLET: 100; 25 TABLET, FILM COATED ORAL at 11:06

## 2025-06-04 RX ADMIN — AMLODIPINE BESYLATE 10 MG: 10 TABLET ORAL at 11:06

## 2025-06-04 RX ADMIN — HYDROMORPHONE HYDROCHLORIDE 0.5 MG: 2 INJECTION INTRAMUSCULAR; INTRAVENOUS; SUBCUTANEOUS at 11:06

## 2025-06-04 RX ADMIN — HEPARIN SODIUM 5000 UNITS: 5000 INJECTION INTRAVENOUS; SUBCUTANEOUS at 10:06

## 2025-06-04 RX ADMIN — OXYCODONE HYDROCHLORIDE 10 MG: 10 TABLET ORAL at 04:06

## 2025-06-04 RX ADMIN — CELECOXIB 200 MG: 200 CAPSULE ORAL at 11:06

## 2025-06-04 RX ADMIN — METHOCARBAMOL 750 MG: 750 TABLET ORAL at 02:06

## 2025-06-04 RX ADMIN — OXYCODONE HYDROCHLORIDE 10 MG: 10 TABLET ORAL at 08:06

## 2025-06-04 RX ADMIN — POLYETHYLENE GLYCOL 3350 17 G: 17 POWDER, FOR SOLUTION ORAL at 11:06

## 2025-06-04 RX ADMIN — HYDROMORPHONE HYDROCHLORIDE 0.5 MG: 2 INJECTION INTRAMUSCULAR; INTRAVENOUS; SUBCUTANEOUS at 05:06

## 2025-06-04 NOTE — PROGRESS NOTES
[facility] - Surgery  Orthopedics  Progress Note    Patient Name: Scott Gallagher  MRN: [id number]  Admission Date: 6/3/2025  Hospital Length of Stay: 1 days  Attending Provider: Raleigh Bond MD  Primary Care Provider: Stalin Gordillo MD  Follow-up For: Procedure(s) (LRB):  FUSION, SPINE, LUMBAR, TLIF, POSTERIOR APPROACH, USING PEDICLE SCREW L4-5 (L) (N/A)    Post-Operative Day: 1 Day Post-Op  Subjective:     Principal Problem:Lumbar spondylosis    Principal Orthopedic Problem: s/p L4-5 TLIF 6/3    Interval History: Overnight events: No issues. Pain only moderately well controlled. Endorses 10-20% improvement to LLE radic compared to preop. Tolerating PO intake. Cunningham out this morning. No other complaints.     Vitals: VSS on RA.     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       Current Facility-Administered Medications   Medication    ceFAZolin 2 g    LIDOcaine-EPINEPHrine 1%-1:100,000 injection    mupirocin 2 % ointment    thrombin (bovine) solution    vancomycin injection     Facility-Administered Medications Ordered in Other Encounters   Medication    ceFAZolin injection    dexAMETHasone injection    dexmedeTOMIDine injection    fentaNYL 50 mcg/mL injection    isolyte infusion    ketamine in 0.9 % sod chloride 50 mg/5 mL (10 mg/mL) injection    LIDOcaine (PF) 20 mg/ml (2%) injection    midazolam (VERSED) 1 mg/mL injection    ondansetron injection    PHENYLephrine 20 mg in 0.9% NaCl 250 mL infusion    PHENYLephrine HCl in 0.9% NaCl 1 mg/10 mL (100 mcg/mL)    propofol (DIPRIVAN) 10 mg/mL infusion    remifentaniL (ULTIVA) 2 mg in 0.9% NaCl 40 mL infusion    rocuronium injection    sodium chloride 0.9% infusion     "sugammadex (BRIDION) 100 mg/mL injection     Objective:     Vital Signs (Most Recent):  Temp: 97.8 °F (36.6 °C) (06/03/25 0622)  Pulse: 72 (06/03/25 0605)  Resp: 20 (06/03/25 0605)  BP: 122/79 (06/03/25 0608)  SpO2: 97 % (06/03/25 0605) Vital Signs (24h Range):  Temp:  [97.8 °F (36.6 °C)] 97.8 °F (36.6 °C)  Pulse:  [72-78] 72  Resp:  [20] 20  SpO2:  [97 %] 97 %  BP: (122)/(79) 122/79     Weight: 132 kg (291 lb)  Height: 6' 3" (190.5 cm)  Body mass index is 36.37 kg/m².      Intake/Output Summary (Last 24 hours) at 6/3/2025 1040  Last data filed at 6/3/2025 1018  Gross per 24 hour   Intake 1000 ml   Output 100 ml   Net 900 ml        Ortho/SPM Exam  Awake/alert/oriented x3, No acute distress, Afebrile, Vital signs stable  Normocephalic, Atraumatic  Good inspiratory effort with unlaboured breathing  Dressing is clean/dry/intact    Motor            RIGHT  LEFT  Hip Flexion (L3)                                  5/5   5/5  Knee Extension (L4)                           5/5   5/5  EHL (L5)                                             5/5   5/5  Gastrocs (S1)                                     5/5   5/5  Peroneals (S1)                                   5/5   5/5       FHL (S2)                                             5/5   5/5         Significant Labs: CBC: No results for input(s): "WBC", "HGB", "HCT", "PLT" in the last 48 hours.  AM labs still pending collection     Significant Imaging: I have reviewed and interpreted all pertinent imaging results/findings.  Assessment/Plan:     * Lumbar spondylosis  Scott Gallagher is a 34 y.o. male s/p L4-5 TLIF on 06/03/2025    Pain control: multimodal regimen  PT/OT: WBAT with spine precautions   DVT PPx:  heparin 5k tid  Abx: 24hr post op ancef  Cunningham:  Dc POD1  Drain:  output   F/u: 4 wk post op appt    Dispo: PT/OT eval today             Buddy Frank MD  Orthopedics  [facility] - Surgery    "

## 2025-06-04 NOTE — PLAN OF CARE
Met with patient in room 940 to review discharge spine education.  All questions answered to patient satisfaction.     Spoke with patient to review spine specific discharge education. Reviewed pain medication usage - discussed how to use muscle relaxers and pain medication, Lifting restrictions - nothing heavier than a gallon of milk or equivalent of 8 pounds, Mobility - proper use of assistive devices (cane or walker) as necessary to aid with mobility needs if indicated, Walk as tolerated or 10 minutes building up to 30 minutes every 2-3 hours, Limit time in bed using favorite chair/recliner as tolerated, wear prescribed brace if indicated and prescribed at all times taking it off only to sleep, shower and eat. Reinforced being mindful regarding bending, lifting and twisting. Discussed wound care and when to call clinic.  Advised that inpatient Case Management will be involved with discharge plan and will be working with the team and physical therapy to have a safe discharge.     I spoke with , Manoj Arnold , advised patient should discharge with  no needs / can have HH for PT/OT if desired.Home medications to be delivered to bedside prior to discharge    My direct number provided.         Leanne Rea (Dee), RNCM  Complex Spine Navigator  [facility]         [phone]

## 2025-06-04 NOTE — PT/OT/SLP PROGRESS
Physical Therapy Co-Treatment with OT    Patient Name:  Scott Gallagher   MRN:  [id number]    Recommendations:     Discharge Recommendations: Low Intensity Therapy  Discharge Equipment Recommendations: walker, rolling  Barriers to discharge: 8 steps to enter home     Assessment:     Scott Gallagher is a 34 y.o. male admitted with a medical diagnosis of Lumbar spondylosis.  He presents with the following impairments/functional limitations: weakness, impaired self care skills, impaired endurance, impaired functional mobility, gait instability, decreased safety awareness, pain, decreased lower extremity function, orthopedic precautions. Pt was agreeable to work with PT/OT this morning despite not receiving pain medication and having increased pain levels. Pt tolerated session well and was able to know self limitations on when he required a break. Pt demonstrated improved mobility and transfer techniques and was receptive of all education. Pt sat up in chair approx. 2 hours and was later returned to bed by PT.  Pt did require increased cueing and demonstration for fear of breaking precautions and better understanding. Pt also demonstrating a progression in discharge recommendation to low intensity, face to face discussion had with PT. Pt also wanting to work on stair training before discharge due to 8 steps to enter home. Pt would continue to benefit from skilled acute PT in order to address current deficits and progress functional mobility.     Co-treatment performed due to patient's multiple deficits requiring two skilled therapists to appropriately and safely assess patient's strength and endurance while facilitating functional tasks in addition to accommodating for patient's activity tolerance.      Pt would benefit from seeing PT/OT separately in future visits to help improve pt tolerance and functional mobility     Rehab Prognosis: Good; patient would benefit from acute skilled PT services to address these deficits and  reach maximum level of function.    Recent Surgery: Procedure(s) (LRB):  FUSION, SPINE, LUMBAR, TLIF, POSTERIOR APPROACH, USING PEDICLE SCREW L4-5 (L) (N/A) 1 Day Post-Op    Plan:     During this hospitalization, patient to be seen 4 x/week to address the identified rehab impairments via gait training, therapeutic activities, therapeutic exercises, neuromuscular re-education and progress toward the following goals:    Plan of Care Expires:  07/03/25    Subjective     Chief Complaint: back pain  Patient/Family Comments/goals: to gain independence back   Pain/Comfort:  Pain Rating 1: 8/10  Location - Orientation 1: generalized  Location 1: back  Pain Addressed 1: Reposition, Distraction, Nurse notified      Objective:     Communicated with RN prior to session.  Patient found HOB elevated with hemovac, SCD upon PT entry to room.     General Precautions: Standard, fall  Orthopedic Precautions: spinal precautions  Braces: N/A  Respiratory Status: Room air     Functional Mobility:  Bed Mobility:     Rolling Right: minimum assistance  Scooting: stand by assistance  Supine to Sit: minimum assistance  Sit to Supine: minimum assistance  Increased cueing for hand placement and sequencing   Demonstration provided for visual cues for pt's understanding     Transfers:     Sit to Stand from EOB: x1 trial; contact guard assistance with rolling walker  Sit to Stand from chair with arms: x2 trial; contact guard assistance with rolling walker  Increased cueing for hand placement, RW management and sequencing   Demonstration provided for visual cues for pt's understanding   Bed to Chair: CGA with RW using step transfer     Gait: x5 steps to chair + 12ft to bathroom (std rest break) + 12ft back to chair + 5 steps back to bed (in pm session)  X4 trials; CGA with RW   Seated/standing rest breaks between trials due to fatigue/pain and to perform ADLS  Cueing required for hand placement, sequencing, and RW management     AM-PAC 6 CLICK  MOBILITY  Turning over in bed (including adjusting bedclothes, sheets and blankets)?: 3  Sitting down on and standing up from a chair with arms (e.g., wheelchair, bedside commode, etc.): 3  Moving from lying on back to sitting on the side of the bed?: 3  Moving to and from a bed to a chair (including a wheelchair)?: 3  Need to walk in hospital room?: 3  Climbing 3-5 steps with a railing?: 1  Basic Mobility Total Score: 16       Treatment & Education:  Educated pt on PT role/POC  Educated pt on importance of OOB activity and daily ambulation  Time provided for active listening, education, counseling and discussion of health disposition in regards to patient's current status   Questions/concerns addressed within PTA scope of practice. Answered to pt satisfaction, no further questions  White board updated accordingly   RN aware of patient's mobility needs and status  Gait belt utilized during session for patient safety  ADLs performed at sink  Increased time given for empathetic care  Increased time given for increased cueing and demonstration     Patient left HOB elevated with all lines intact, call button in reach, and RN notified..    GOALS:   Multidisciplinary Problems       Physical Therapy Goals          Problem: Physical Therapy    Goal Priority Disciplines Outcome Interventions   Physical Therapy Goal     PT, PT/OT Progressing    Description: Goals to be met by: 7/3/25     Patient will increase functional independence with mobility by performin. Supine to sit with Set-up Upper Tract  2. Rolling to Left and Right with Set-up Assistance.  3. Sit to stand transfer with Supervision  4. Bed to chair transfer with Supervision using LRAD or no AD  5. Gait  x 150 feet with Supervision using LRAD or no AD.   6. Ascend/descend 8 stair with left Handrails Contact Guard Assistance using LRAD or no AD.                          DME Justifications:  Scott's mobility limitation cannot be sufficiently resolved by the  use of a cane. His functional mobility deficit can be sufficiently resolved with the use of a Rolling Walker. Patient's mobility limitation significantly impairs their ability to participate in one of more activities of daily living.  The use of a RW will significantly improve the patient's ability to participate in MRADLS and the patient will use it on regular basis in the home.     Time Tracking:     PT Received On: 06/04/25  PT Start Time: 0817 (1047)     PT Stop Time: 0900 (1113)  PT Total Time (min): 43 min + 26 = 69mins    Billable Minutes: Gait Training 30, Therapeutic Activity 26 (in pm session), and Neuromuscular Re-education 13    Treatment Type: Treatment  PT/PTA: PTA     Number of PTA visits since last PT visit: 1 06/04/2025

## 2025-06-04 NOTE — PT/OT/SLP PROGRESS
"Occupational Therapy  Co- Treatment    Name: Scott Gallagher  MRN: [id number]  Admitting Diagnosis:  Lumbar spondylosis  1 Day Post-Op    Recommendations:     Discharge Recommendations: Low Intensity Therapy  Discharge Equipment Recommendations:  walker, rolling, bath bench, bedside commode  Barriers to discharge:  None    Assessment:     Scott Gallagher is a 34 y.o. male with a medical diagnosis of Lumbar spondylosis.  He presents with significant improvements today, although still limited by pain and anxiety associated with movement. Patient able to recall spinal precautions and with good adherence to such during session. Performance deficits affecting function are weakness, impaired endurance, impaired self care skills, impaired functional mobility, gait instability, impaired balance, decreased safety awareness, decreased lower extremity function, impaired skin, orthopedic precautions. Patient's discharge recommendation updated appropriately based on progress towards goals. Patient continues to demonstrate the need for low intensity therapy on a scheduled basis exhibited by decreased independence with self-care and functional mobility     Rehab Prognosis:  Good; patient would benefit from acute skilled OT services to address these deficits and reach maximum level of function.       Plan:     Patient to be seen 4 x/week to address the above listed problems via self-care/home management, therapeutic exercises, therapeutic activities, neuromuscular re-education  Plan of Care Expires: 07/03/25  Plan of Care Reviewed with: patient    Subjective     Chief Complaint: pain  Patient/Family Comments/goals: "I'm scared it's going to hurt"  Pain/Comfort:  Pain Rating 1: 7/10  Location - Orientation 1: generalized  Location 1: back  Pain Addressed 1: Reposition, Distraction, Nurse notified  Pain Rating Post-Intervention 1: 8/10    Objective:   Co-treatment with PT performed for patient safety, education, and facilitation of " treatment to maximize activity tolerance and progression towards goals from two skilled therapy disciplines.    Communicated with: nursing prior to session.  Patient found HOB elevated with hemovac, SCD upon OT entry to room.    General Precautions: Standard, fall    Orthopedic Precautions:spinal precautions  Braces: N/A  Respiratory Status: Room air     Occupational Performance:     Bed Mobility:  completed from flat bed level to simulate home environment  Patient completed Rolling/Turning to Right with minimum assistance  Patient completed Supine to Sit with minimum assistance     Functional Mobility/Transfers:  Patient completed Sit <> Stand Transfer with contact guard assistance  with  rolling walker x2 reps  Functional Mobility: Patient ambulated from bed>chair and then to/from bathroom with RW and CGA; no LOB although slow ambulation speed and intermittent cueing required for novel AD    Activities of Daily Living:  Grooming: contact guard assistance to brush teeth in stance      [facility] 6 Click ADL: 18    Treatment & Education:    Patient educated on:   -use of breathing to assist with pain   -spinal precautions (no bending, lifting, and twisting) and its impact on ADL participation, specifically with LB dressing and toileting  -pushing up/reaching back with both hands during sit<>stand transfers to reduce twisting motion and thus pain  -purpose of OT and OT POC  -facilitation and education on proper body mechanics, energy conservation, and safety  -importance of early mobility and out of bed activities with staff assist  -overall benefits of therapy     All questions answered within OT scope and to patient's satisfaction    Patient left up in chair with all lines intact, call button in reach, and nursing notified    GOALS:   Multidisciplinary Problems       Occupational Therapy Goals          Problem: Occupational Therapy    Goal Priority Disciplines Outcome Interventions   Occupational Therapy Goal     OT,  PT/OT Progressing    Description: Goals to be met by: 7/3/25     Patient will increase functional independence with ADLs by performing:    LE Dressing with Modified [place] and AE prn.  Grooming while standing at sink with Modified [place].  Toileting from toilet/bedside commode with Modified [place] for hygiene and clothing management.   Supine to sit with Modified [place].  Toilet transfer to toilet/bedside commode with Modified [place].                         DME Justifications:   Scott requires a commode for home use because he is confined to a single room.   Scott's mobility limitation cannot be sufficiently resolved by the use of a cane. His functional mobility deficit can be sufficiently resolved with the use of a Rolling Walker. Patient's mobility limitation significantly impairs their ability to participate in one of more activities of daily living.  The use of a RW will significantly improve the patient's ability to participate in MRADLS and the patient will use it on regular basis in the home.    Time Tracking:     OT Date of Treatment: 06/04/25  OT Start Time: 0818  OT Stop Time: 0859  OT Total Time (min): 41 min    Billable Minutes:Self Care/Home Management 11  Therapeutic Activity 30    OT/JULIO: OT          6/4/2025

## 2025-06-05 PROCEDURE — 97530 THERAPEUTIC ACTIVITIES: CPT | Mod: CQ

## 2025-06-05 PROCEDURE — 63600175 PHARM REV CODE 636 W HCPCS: Performed by: ORTHOPAEDIC SURGERY

## 2025-06-05 PROCEDURE — 97530 THERAPEUTIC ACTIVITIES: CPT | Mod: CO

## 2025-06-05 PROCEDURE — 25000003 PHARM REV CODE 250

## 2025-06-05 PROCEDURE — 11000001 HC ACUTE MED/SURG PRIVATE ROOM

## 2025-06-05 PROCEDURE — 97535 SELF CARE MNGMENT TRAINING: CPT | Mod: CO

## 2025-06-05 PROCEDURE — 25000003 PHARM REV CODE 250: Performed by: STUDENT IN AN ORGANIZED HEALTH CARE EDUCATION/TRAINING PROGRAM

## 2025-06-05 PROCEDURE — 97116 GAIT TRAINING THERAPY: CPT | Mod: CQ

## 2025-06-05 PROCEDURE — 63600175 PHARM REV CODE 636 W HCPCS: Performed by: STUDENT IN AN ORGANIZED HEALTH CARE EDUCATION/TRAINING PROGRAM

## 2025-06-05 RX ORDER — SIMETHICONE 80 MG
2 TABLET,CHEWABLE ORAL ONCE
Status: COMPLETED | OUTPATIENT
Start: 2025-06-05 | End: 2025-06-05

## 2025-06-05 RX ADMIN — ACETAMINOPHEN 1000 MG: 500 TABLET ORAL at 01:06

## 2025-06-05 RX ADMIN — HYDROMORPHONE HYDROCHLORIDE 0.5 MG: 2 INJECTION INTRAMUSCULAR; INTRAVENOUS; SUBCUTANEOUS at 12:06

## 2025-06-05 RX ADMIN — METHOCARBAMOL 1000 MG: 500 TABLET ORAL at 02:06

## 2025-06-05 RX ADMIN — OXYCODONE HYDROCHLORIDE 10 MG: 10 TABLET ORAL at 06:06

## 2025-06-05 RX ADMIN — OXYCODONE HYDROCHLORIDE 10 MG: 10 TABLET ORAL at 07:06

## 2025-06-05 RX ADMIN — LOSARTAN POTASSIUM AND HYDROCHLOROTHIAZIDE 1 TABLET: 100; 25 TABLET, FILM COATED ORAL at 08:06

## 2025-06-05 RX ADMIN — METHOCARBAMOL 1000 MG: 500 TABLET ORAL at 08:06

## 2025-06-05 RX ADMIN — HYDROMORPHONE HYDROCHLORIDE 0.5 MG: 2 INJECTION INTRAMUSCULAR; INTRAVENOUS; SUBCUTANEOUS at 04:06

## 2025-06-05 RX ADMIN — ACETAMINOPHEN 1000 MG: 500 TABLET ORAL at 08:06

## 2025-06-05 RX ADMIN — POLYETHYLENE GLYCOL 3350 17 G: 17 POWDER, FOR SOLUTION ORAL at 08:06

## 2025-06-05 RX ADMIN — CELECOXIB 200 MG: 200 CAPSULE ORAL at 08:06

## 2025-06-05 RX ADMIN — SIMETHICONE 160 MG: 80 TABLET, CHEWABLE ORAL at 01:06

## 2025-06-05 RX ADMIN — HEPARIN SODIUM 5000 UNITS: 5000 INJECTION INTRAVENOUS; SUBCUTANEOUS at 06:06

## 2025-06-05 RX ADMIN — HYDROMORPHONE HYDROCHLORIDE 0.5 MG: 2 INJECTION INTRAMUSCULAR; INTRAVENOUS; SUBCUTANEOUS at 11:06

## 2025-06-05 RX ADMIN — HYDROMORPHONE HYDROCHLORIDE 0.5 MG: 2 INJECTION INTRAMUSCULAR; INTRAVENOUS; SUBCUTANEOUS at 09:06

## 2025-06-05 RX ADMIN — OXYCODONE HYDROCHLORIDE 10 MG: 10 TABLET ORAL at 09:06

## 2025-06-05 RX ADMIN — HEPARIN SODIUM 5000 UNITS: 5000 INJECTION INTRAVENOUS; SUBCUTANEOUS at 08:06

## 2025-06-05 RX ADMIN — OXYCODONE HYDROCHLORIDE 10 MG: 10 TABLET ORAL at 02:06

## 2025-06-05 RX ADMIN — ACETAMINOPHEN 1000 MG: 500 TABLET ORAL at 05:06

## 2025-06-05 RX ADMIN — HEPARIN SODIUM 5000 UNITS: 5000 INJECTION INTRAVENOUS; SUBCUTANEOUS at 01:06

## 2025-06-05 RX ADMIN — OXYCODONE HYDROCHLORIDE 10 MG: 10 TABLET ORAL at 01:06

## 2025-06-05 RX ADMIN — AMLODIPINE BESYLATE 10 MG: 10 TABLET ORAL at 08:06

## 2025-06-05 NOTE — PLAN OF CARE
Discussed discharge plan with patient, referral sent to [facility].    RW delivered to bedside per [facility]

## 2025-06-05 NOTE — ASSESSMENT & PLAN NOTE
Scott Gallagher is a 34 y.o. male s/p L4-5 TLIF on 06/05/2025    Pain control: multimodal regimen  PT/OT: WBAT with spine precautions   DVT PPx:  heparin 5k tid  Abx: 24hr post op ancef  Octavio:  Tha POD1  Drain: 30cc output   F/u: 4 wk post op appt    Dispo: Continue PT/OT. Would benefit from one additional therapy session. Drain output requires 1 additional night. Dc home tomorrow

## 2025-06-05 NOTE — PT/OT/SLP PROGRESS
Occupational Therapy   Treatment    Name: Scott Gallagher  MRN: [id number]  Admitting Diagnosis:  Lumbar spondylosis  2 Days Post-Op    Recommendations:     Discharge Recommendations: Low Intensity Therapy  Discharge Equipment Recommendations:  walker, rolling, bath bench, bedside commode  Barriers to discharge:  None    Assessment:     Scott Gallagher is a 34 y.o. male with a medical diagnosis of Lumbar spondylosis.  He presents with the following performance deficits affecting function: weakness, impaired functional mobility, gait instability, impaired endurance, impaired balance, impaired self care skills, decreased lower extremity function, decreased upper extremity function, decreased coordination, pain, decreased safety awareness, orthopedic precautions.     Rehab Prognosis:  Good; patient would benefit from acute skilled OT services to address these deficits and reach maximum level of function.       Plan:     Patient to be seen 4 x/week to address the above listed problems via self-care/home management, therapeutic activities, therapeutic exercises, neuromuscular re-education  Plan of Care Expires: 07/03/25  Plan of Care Reviewed with: patient, spouse    Subjective     Patient/Family Comments/goals: to improve function  Pain/Comfort:  Pain Rating 1: 5/10  Location - Orientation 1: generalized  Location 1: back  Pain Addressed 1: Reposition, Distraction  Pain Rating Post-Intervention 1: 5/10    Objective:     Communicated with: RN prior to session.  Patient found up in chair with NICCI butler upon OT entry to room.  A client care conference was completed by the OTR and the SALES prior to treatment by the SALES to discuss the patient's POC and current status.    General Precautions: Standard, fall    Orthopedic Precautions:spinal precautions  Braces: N/A  Respiratory Status: Room air     Occupational Performance:     Bed Mobility:    Patient completed Sit to Supine with minimum assistance assisting at  BLE    Functional Mobility/Transfers:  Patient completed Sit <> Stand Transfer with contact guard assistance  with  rolling walker   Functional Mobility: pt ambulating ~16ft x 2 trials with CGA using RW.     Activities of Daily Living:  Grooming: stand by assistance standing at sink with RW for oral hygiene and facial care      [facility] 6 Click ADL: 19    Treatment & Education:  Pt educated on OT POC and frequency during hospital stay.   Pt educated on proper hand placement and techniques for RW mgmt to improve safety awareness.   Pt educated on importance of OOB activity to improve function and activity tolerance.  Pt educated on adaptive equipment (tub bench) for improve safety.   Pt educated on log rolling techniques to improve safety awareness.   Pt educated on spinal precautions.   Addressed all patient questions/concerns within SALES scope of practice.     Patient left HOB elevated with all lines intact, call button in reach, and RN notified    GOALS:   Multidisciplinary Problems       Occupational Therapy Goals          Problem: Occupational Therapy    Goal Priority Disciplines Outcome Interventions   Occupational Therapy Goal     OT, PT/OT Progressing    Description: Goals to be met by: 7/3/25     Patient will increase functional independence with ADLs by performing:    LE Dressing with Modified [place] and AE prn.  Grooming while standing at sink with Modified [place].  Toileting from toilet/bedside commode with Modified [place] for hygiene and clothing management.   Supine to sit with Modified [place].  Toilet transfer to toilet/bedside commode with Modified [place].                       Time Tracking:     OT Date of Treatment: 06/05/25  OT Start Time: 1004  OT Stop Time: 1034  OT Total Time (min): 30 min    Billable Minutes:Self Care/Home Management 15  Therapeutic Activity 15    OT/JULIO: JULIO     Number of JULIO visits since last OT visit: 1    6/5/2025

## 2025-06-05 NOTE — PT/OT/SLP PROGRESS
Physical Therapy Treatment    Patient Name:  Scott Gallagher   MRN:  [id number]    Recommendations:     Discharge Recommendations: Low Intensity Therapy  Discharge Equipment Recommendations: walker, rolling  Barriers to discharge: 8 steps to enter home    Assessment:     Scott Gallagher is a 34 y.o. male admitted with a medical diagnosis of Lumbar spondylosis.  He presents with the following impairments/functional limitations: weakness, impaired self care skills, decreased safety awareness, impaired endurance, impaired functional mobility, gait instability, pain, decreased lower extremity function, orthopedic precautions. Pt was agreeable and motivated to work with PT today. Pt reports decreased pain levels during AM session and improved functional mobility. Pt treated both sessions well.  Pt did require PT to come back for a second visit in the PM due to having trouble with bed mobility with PCT when trying to get to restroom, PCT found PTA in [place] for assistance. Pt was educated and seen again for a second session. Pt did require increased cueing and demonstration for fear of breaking precautions and better understanding. Pt would continue to benefit from skilled acute PT in order to address current deficits and progress functional mobility.     Rehab Prognosis: Good; patient would benefit from acute skilled PT services to address these deficits and reach maximum level of function.    Recent Surgery: Procedure(s) (LRB):  FUSION, SPINE, LUMBAR, TLIF, POSTERIOR APPROACH, USING PEDICLE SCREW L4-5 (L) (N/A) 2 Days Post-Op    Plan:     During this hospitalization, patient to be seen 4 x/week to address the identified rehab impairments via gait training, therapeutic activities, therapeutic exercises, neuromuscular re-education and progress toward the following goals:    Plan of Care Expires:  07/03/25    Subjective     Chief Complaint: back pain  Patient/Family Comments/goals: to gain independence back  Pain/Comfort:  Pain  Rating 1: 3/10 (first visit in am)  Location - Orientation 1: generalized  Location 1: back  Pain Addressed 1: Pre-medicate for activity, Reposition, Distraction, Nurse notified  Pain Rating Post-Intervention 1: 8/10 (second visit in pm)  Pain Addressed 2: Reposition, Distraction, Nurse notified      Objective:     Communicated with RN prior to session.  Patient found HOB elevated with hemovac, SCD upon PT entry to room.     General Precautions: Standard, fall  Orthopedic Precautions: spinal precautions  Braces: N/A  Respiratory Status: Room air     Functional Mobility:  Bed Mobility:     Rolling Left:  contact guard assistance  Rolling Right: contact guard assistance  Scooting: stand by assistance  Supine to Sit: stand by assistance  Sit to Supine: minimum assistance    Transfers:     Sit to Stand from EOB: x2 trials; stand by assistance with rolling walker  Sit to Stand from chair with arms : x2 trials; stand by assistance with rolling walker  Sit to Stand from from raised toilet: x1 trials; stand by assistance with rolling walker  Bed to Chair: contact guard assistance with  rolling walker  using  Step Transfer    Gait:    First visit: 5 steps + 100ft CGA with RW  Second visit: 12ft + 12ft CGA with RW  Increased cueing for hand placement, sequencing, and RW management     Stairs: ascended/descended x8 stairs using L hand rail with CGA no AD  Demonstration provided  Increased cueing for hand placement and sequencing       AM-PAC 6 CLICK MOBILITY  Turning over in bed (including adjusting bedclothes, sheets and blankets)?: 3  Sitting down on and standing up from a chair with arms (e.g., wheelchair, bedside commode, etc.): 3  Moving from lying on back to sitting on the side of the bed?: 3  Moving to and from a bed to a chair (including a wheelchair)?: 3  Need to walk in hospital room?: 3  Climbing 3-5 steps with a railing?: 1  Basic Mobility Total Score: 16       Treatment & Education:  Educated pt on PT  role/POC  Educated pt on importance of OOB activity and daily ambulation  Time provided for active listening, education, counseling and discussion of health disposition in regards to patient's current status   Questions/concerns addressed within PTA scope of practice. Answered to pt satisfaction, no further questions  White board updated accordingly   RN aware of patient's mobility needs and status  Discussed RW management, fall prevention, and safety   Gait belt utilized during session for patient safety  Bedside table in front of patient and area set up for function, convenience, and safety   Increased cueing for hand placement, sequencing, and RW management     Patient left HOB elevated with all lines intact, call button in reach, RN notified, and partner present..    GOALS:   Multidisciplinary Problems       Physical Therapy Goals          Problem: Physical Therapy    Goal Priority Disciplines Outcome Interventions   Physical Therapy Goal     PT, PT/OT Progressing    Description: Goals to be met by: 7/3/25     Patient will increase functional independence with mobility by performin. Supine to sit with Set-up [place]  2. Rolling to Left and Right with Set-up Assistance.  3. Sit to stand transfer with Supervision  4. Bed to chair transfer with Supervision using LRAD or no AD  5. Gait  x 150 feet with Supervision using LRAD or no AD.   6. Ascend/descend 8 stair with left Handrails Contact Guard Assistance using LRAD or no AD.                          DME Justifications:  Scott's mobility limitation cannot be sufficiently resolved by the use of a cane. His functional mobility deficit can be sufficiently resolved with the use of a Rolling Walker. Patient's mobility limitation significantly impairs their ability to participate in one of more activities of daily living.  The use of a RW will significantly improve the patient's ability to participate in MRADLS and the patient will use it on regular basis in  the home.     Time Tracking:     PT Received On: 06/05/25  PT Start Time: 0858 (1204)     PT Stop Time: 0941 (1230)  PT Total Time (min): 43 min + 26 = 69 mins    Billable Minutes: Gait Training 38 and Therapeutic Activity 31    Treatment Type: Treatment  PT/PTA: PTA     Number of PTA visits since last PT visit: 2     06/05/2025

## 2025-06-05 NOTE — PLAN OF CARE
Problem: Adult Inpatient Plan of Care  Goal: Plan of Care Review  Outcome: Progressing  Goal: Patient-Specific Goal (Individualized)  Outcome: Progressing  Goal: Absence of Hospital-Acquired Illness or Injury  Outcome: Progressing  Goal: Optimal Comfort and Wellbeing  Outcome: Progressing  Goal: Readiness for Transition of Care  Outcome: Progressing     Problem: Wound  Goal: Optimal Coping  Outcome: Progressing  Goal: Optimal Functional Ability  Outcome: Progressing  Goal: Absence of Infection Signs and Symptoms  Outcome: Progressing  Goal: Improved Oral Intake  Outcome: Progressing  Goal: Optimal Pain Control and Function  Outcome: Progressing  Goal: Skin Health and Integrity  Outcome: Progressing  Goal: Optimal Wound Healing  Outcome: Progressing     Problem: Infection  Goal: Absence of Infection Signs and Symptoms  Outcome: Progressing   Pt is aaox4, currently resting in bed with no signs of acute distress. SCD's are on and functional. Addressed pt pain with schedule and prn meds. Pt did really well today ambulating with PT/OT in the hallway and tolerated activity without complications. Incision is clean, dry, and intact. The right Hemovac drain output was approximately 2 cc throughout shift, the left Hemovac drain remains 0 cc output. Safety measures are in place, bed in the lowest position with wheels locked, side rails up x2, call light and personal belongings within reach. Continue plan of care.

## 2025-06-05 NOTE — PROGRESS NOTES
[facility] - [facility]  Orthopedics  Progress Note    Patient Name: Scott Gallagher  MRN: [id number]  Admission Date: 6/3/2025  Hospital Length of Stay: 2 days  Attending Provider: Raleigh Bond MD  Primary Care Provider: Stalin Gordillo MD  Follow-up For: Procedure(s) (LRB):  FUSION, SPINE, LUMBAR, TLIF, POSTERIOR APPROACH, USING PEDICLE SCREW L4-5 (L) (N/A)    Post-Operative Day: 2 Days Post-Op  Subjective:     Principal Problem:Lumbar spondylosis    Principal Orthopedic Problem: s/p L4-5 TLIF 6/3    Interval History: Overnight events: No issues. Pain improving after alteration to multimodals. Completed bowel movement and voiding independently. Would benefit from one additional day of therapy.     Vitals: VSS on RA.     PT/OT update: 12ft with PT. Recs for low intensity.     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       Current Facility-Administered Medications   Medication    acetaminophen tablet 1,000 mg    losartan-hydrochlorothiazide 100-25 mg per tablet 1 tablet    And    amLODIPine tablet 10 mg    celecoxib capsule 200 mg    heparin (porcine) injection 5,000 Units    HYDROmorphone (PF) injection 0.5 mg    methocarbamoL tablet 1,000 mg    ondansetron injection 4 mg    oxyCODONE immediate release tablet 5 mg    oxyCODONE immediate release tablet Tab 10 mg    polyethylene glycol packet 17 g     Objective:     Vital Signs (Most Recent):  Temp: 98.1 °F (36.7 °C) (06/05/25 0451)  Pulse: 83 (06/05/25 0451)  Resp: 18 (06/05/25 0451)  BP: 138/77 (06/05/25 0451)  SpO2: 96 % (06/05/25 0451) Vital Signs (24h Range):  Temp:  [97.6 °F (36.4 °C)-98.3 °F (36.8 °C)] 98.1 °F (36.7 °C)  Pulse:  [72-83] 83  Resp:  [16-19] 18  SpO2:  [96 %-98 %] 96 %  BP: (114-138)/(68-84)  "138/77     Weight: 132 kg (291 lb 0.1 oz)  Height: 6' 3" (190.5 cm)  Body mass index is 36.37 kg/m².      Intake/Output Summary (Last 24 hours) at 6/5/2025 0653  Last data filed at 6/5/2025 0556  Gross per 24 hour   Intake 240 ml   Output 328 ml   Net -88 ml        Ortho/SPM Exam  Awake/alert/oriented x3, No acute distress, Afebrile, Vital signs stable  Normocephalic, Atraumatic  Good inspiratory effort with unlaboured breathing  Dressing is clean/dry/intact    Motor            RIGHT  LEFT  Hip Flexion (L3)                                  5/5   5/5  Knee Extension (L4)                           5/5   5/5  EHL (L5)                                             5/5   5/5  Gastrocs (S1)                                     5/5   5/5  Peroneals (S1)                                   5/5   5/5       FHL (S2)                                             5/5   5/5       Significant Labs: CBC:   Recent Labs   Lab 06/04/25  0940   WBC 14.45*   HGB 14.8   HCT 43.6        AM labs still pending collection     Significant Imaging: I have reviewed and interpreted all pertinent imaging results/findings.  Assessment/Plan:     * Lumbar spondylosis  Scott Gallagher is a 34 y.o. male s/p L4-5 TLIF on 06/05/2025    Pain control: multimodal regimen  PT/OT: WBAT with spine precautions   DVT PPx:  heparin 5k tid  Abx: 24hr post op ancef  Octavio:  Tha POD1  Drain: 30cc output   F/u: 4 wk post op appt    Dispo: Continue PT/OT. Would benefit from one additional therapy session. Drain output requires 1 additional night. Dc home tomorrow              Buddy Frank MD  Orthopedics  [facility] - Surgery    "

## 2025-06-05 NOTE — SUBJECTIVE & OBJECTIVE
"Principal Problem:Lumbar spondylosis    Principal Orthopedic Problem: s/p L4-5 TLIF 6/3    Interval History: Overnight events: No issues. Pain improving after alteration to multimodals. Completed bowel movement and voiding independently. Would benefit from one additional day of therapy.     Vitals: VSS on RA.     PT/OT update: 12ft with PT. Recs for low intensity.     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       Current Facility-Administered Medications   Medication    acetaminophen tablet 1,000 mg    losartan-hydrochlorothiazide 100-25 mg per tablet 1 tablet    And    amLODIPine tablet 10 mg    celecoxib capsule 200 mg    heparin (porcine) injection 5,000 Units    HYDROmorphone (PF) injection 0.5 mg    methocarbamoL tablet 1,000 mg    ondansetron injection 4 mg    oxyCODONE immediate release tablet 5 mg    oxyCODONE immediate release tablet Tab 10 mg    polyethylene glycol packet 17 g     Objective:     Vital Signs (Most Recent):  Temp: 98.1 °F (36.7 °C) (06/05/25 0451)  Pulse: 83 (06/05/25 0451)  Resp: 18 (06/05/25 0451)  BP: 138/77 (06/05/25 0451)  SpO2: 96 % (06/05/25 0451) Vital Signs (24h Range):  Temp:  [97.6 °F (36.4 °C)-98.3 °F (36.8 °C)] 98.1 °F (36.7 °C)  Pulse:  [72-83] 83  Resp:  [16-19] 18  SpO2:  [96 %-98 %] 96 %  BP: (114-138)/(68-84) 138/77     Weight: 132 kg (291 lb 0.1 oz)  Height: 6' 3" (190.5 cm)  Body mass index is 36.37 kg/m².      Intake/Output Summary (Last 24 hours) at 6/5/2025 0634  Last data filed at 6/5/2025 0556  Gross per 24 hour   Intake 240 ml   Output 328 ml   Net -88 ml        Ortho/SPM Exam  Awake/alert/oriented x3, No acute distress, Afebrile, Vital signs stable  Normocephalic, Atraumatic  Good inspiratory effort with unlaboured " breathing  Dressing is clean/dry/intact    Motor            RIGHT  LEFT  Hip Flexion (L3)                                  5/5   5/5  Knee Extension (L4)                           5/5   5/5  EHL (L5)                                             5/5   5/5  Gastrocs (S1)                                     5/5   5/5  Peroneals (S1)                                   5/5   5/5       FHL (S2)                                             5/5   5/5       Significant Labs: CBC:   Recent Labs   Lab 06/04/25  0940   WBC 14.45*   HGB 14.8   HCT 43.6        AM labs still pending collection     Significant Imaging: I have reviewed and interpreted all pertinent imaging results/findings.

## 2025-06-06 ENCOUNTER — PATIENT MESSAGE (OUTPATIENT)
Dept: ORTHOPEDICS | Facility: CLINIC | Age: 35
End: 2025-06-06
Payer: COMMERCIAL

## 2025-06-06 VITALS
BODY MASS INDEX: 36.18 KG/M2 | TEMPERATURE: 99 F | OXYGEN SATURATION: 93 % | HEIGHT: 75 IN | SYSTOLIC BLOOD PRESSURE: 130 MMHG | RESPIRATION RATE: 18 BRPM | WEIGHT: 291 LBS | DIASTOLIC BLOOD PRESSURE: 81 MMHG | HEART RATE: 97 BPM

## 2025-06-06 PROCEDURE — 63600175 PHARM REV CODE 636 W HCPCS: Performed by: STUDENT IN AN ORGANIZED HEALTH CARE EDUCATION/TRAINING PROGRAM

## 2025-06-06 PROCEDURE — 25000003 PHARM REV CODE 250: Performed by: STUDENT IN AN ORGANIZED HEALTH CARE EDUCATION/TRAINING PROGRAM

## 2025-06-06 PROCEDURE — 63600175 PHARM REV CODE 636 W HCPCS: Performed by: ORTHOPAEDIC SURGERY

## 2025-06-06 PROCEDURE — 97530 THERAPEUTIC ACTIVITIES: CPT | Mod: CQ

## 2025-06-06 PROCEDURE — 97530 THERAPEUTIC ACTIVITIES: CPT | Mod: CO

## 2025-06-06 PROCEDURE — 97116 GAIT TRAINING THERAPY: CPT | Mod: CQ

## 2025-06-06 PROCEDURE — 97535 SELF CARE MNGMENT TRAINING: CPT | Mod: CO

## 2025-06-06 RX ORDER — METHOCARBAMOL 500 MG/1
1000 TABLET, FILM COATED ORAL 3 TIMES DAILY
Qty: 84 TABLET | Refills: 0 | Status: SHIPPED | OUTPATIENT
Start: 2025-06-06 | End: 2025-06-20

## 2025-06-06 RX ORDER — ACETAMINOPHEN 500 MG
1000 TABLET ORAL EVERY 8 HOURS
Qty: 84 TABLET | Refills: 0 | Status: SHIPPED | OUTPATIENT
Start: 2025-06-06 | End: 2025-06-20

## 2025-06-06 RX ADMIN — CELECOXIB 200 MG: 200 CAPSULE ORAL at 08:06

## 2025-06-06 RX ADMIN — OXYCODONE HYDROCHLORIDE 10 MG: 10 TABLET ORAL at 08:06

## 2025-06-06 RX ADMIN — HYDROMORPHONE HYDROCHLORIDE 0.5 MG: 2 INJECTION INTRAMUSCULAR; INTRAVENOUS; SUBCUTANEOUS at 03:06

## 2025-06-06 RX ADMIN — HEPARIN SODIUM 5000 UNITS: 5000 INJECTION INTRAVENOUS; SUBCUTANEOUS at 04:06

## 2025-06-06 RX ADMIN — OXYCODONE HYDROCHLORIDE 10 MG: 10 TABLET ORAL at 01:06

## 2025-06-06 RX ADMIN — POLYETHYLENE GLYCOL 3350 17 G: 17 POWDER, FOR SOLUTION ORAL at 08:06

## 2025-06-06 RX ADMIN — LOSARTAN POTASSIUM AND HYDROCHLOROTHIAZIDE 1 TABLET: 100; 25 TABLET, FILM COATED ORAL at 08:06

## 2025-06-06 RX ADMIN — ACETAMINOPHEN 1000 MG: 500 TABLET ORAL at 04:06

## 2025-06-06 RX ADMIN — OXYCODONE HYDROCHLORIDE 10 MG: 10 TABLET ORAL at 04:06

## 2025-06-06 RX ADMIN — AMLODIPINE BESYLATE 10 MG: 10 TABLET ORAL at 08:06

## 2025-06-06 RX ADMIN — METHOCARBAMOL 1000 MG: 500 TABLET ORAL at 08:06

## 2025-06-06 NOTE — ASSESSMENT & PLAN NOTE
Scott Gallagher is a 34 y.o. male s/p L4-5 TLIF on 06/06/2025    Pain control: multimodal regimen  PT/OT: WBAT with spine precautions   DVT PPx:  heparin 5k tid  Abx: 24hr post op ancef  [place]:  voiding   Drain: 0cc output   F/u: 4 wk post op appt    Dispo: drains out, L spine XR and dc home today

## 2025-06-06 NOTE — PLAN OF CARE
[facility] - Surgery  Initial Discharge Assessment       Primary Care Provider: Stalin Gordillo MD    Admission Diagnosis: S/P lumbar discectomy [Z98.890]  Lumbar radiculopathy, chronic [M54.16]  Lumbar spondylolysis [M43.06]    Admission Date: 6/3/2025  Expected Discharge Date: 6/6/2025    Transition of Care Barriers: None    Payor: [place] [employer]UE [employer] / Plan: [place] PPO / Product Type: PPO /     Extended Emergency Contact Information  Primary Emergency Contact: Faheem Cortes  Address: [address] [place] [address] [id number] [place] [address]  Mobile Phone: [phone]  Relation: Significant other  Secondary Emergency Contact: Jacquie Dominguez   [place]  Mobile Phone: [phone]  Relation: Sister  Preferred language: English   needed? No    Discharge Plan A: Home with family, Home Health  Discharge Plan B: Home with family      [id number] [address] [address]  Phone: [phone] Fax: [phone]    [facility] [address] [address]  Phone: [phone] Fax: [phone]    [id number]  [address] [place] [address]  Phone: [phone] Fax: [phone]    [id number] [place] [address]  Phone: [phone] Fax: [phone]    [id number] - [place], [place] - [address] AT [place] [address] [address]  [facility] [address]  Phone: [phone] Fax: [phone]      Initial Assessment (most recent)       Adult Discharge Assessment - 06/05/25 1310          Discharge Assessment    Assessment Type Discharge Planning Assessment     Source of Information patient;family   s.o. Faheem Perkins    Communicated BONIFACIO with patient/caregiver Yes     People in  Home significant other     Name(s) of People in Home awilda Cortes     Do you expect to return to your current living situation? Yes     Do you have help at home or someone to help you manage your care at home? Yes     Who are your caregiver(s) and their phone number(s)? awilda Cortes     Prior to hospitilization cognitive status: Alert/Oriented     Current cognitive status: Alert/Oriented     Home Layout Able to live on 1st floor     Do you currently have service(s) that help you manage your care at home? No     How do you get to doctors appointments? family or friend will provide     Are you on dialysis? No     Discharge Plan A Home with family;Home Health     Discharge Plan B Home with family     DME Needed Upon Discharge  walker, rolling     Discharge Plan discussed with: Spouse/sig other     Name(s) and Number(s) awilda Cortes     Transition of Care Barriers None                   Patient lives with awilda Cortes in a single story home with eight entry steps and hand railing on one side. Patient amenable to therapy recommendations. Awilda Cortes  is primary caregiver and will assist in patient care and provide patient transportation home.

## 2025-06-06 NOTE — PT/OT/SLP PROGRESS
Physical Therapy Treatment    Patient Name:  Scott Gallagher   MRN:  [id number]    Recommendations:     Discharge Recommendations: Low Intensity Therapy  Discharge Equipment Recommendations: walker, rolling  Barriers to discharge: 8 steps to enter home    Assessment:     Scott Gallagher is a 34 y.o. male admitted with a medical diagnosis of Lumbar spondylosis.  He presents with the following impairments/functional limitations: weakness, impaired endurance, impaired functional mobility, gait instability, impaired balance, impaired self care skills, decreased upper extremity function, decreased lower extremity function, pain, orthopedic precautions. Pt was agreeable and motivated to work with PT. Pt transportation for xray arrived while being seen during session, help pt get to transportation chair. Pt demonstrates \cautious behavior, and is able to maintain his precautions and understanding of correct techniques to utilize when mobilizing and transferring. Pt would continue to benefit from skilled acute PT in order to address current deficits and progress functional mobility.     Rehab Prognosis: Good; patient would benefit from acute skilled PT services to address these deficits and reach maximum level of function.    Recent Surgery: Procedure(s) (LRB):  FUSION, SPINE, LUMBAR, TLIF, POSTERIOR APPROACH, USING PEDICLE SCREW L4-5 (L) (N/A) 3 Days Post-Op    Plan:     During this hospitalization, patient to be seen 4 x/week to address the identified rehab impairments via gait training, therapeutic activities, therapeutic exercises, neuromuscular re-education and progress toward the following goals:    Plan of Care Expires:  07/03/25    Subjective     Chief Complaint: back pain  Patient/Family Comments/goals: to get better   Pain/Comfort:  Pain Rating 1: 5/10  Location - Orientation 1: generalized  Location 1: back  Pain Addressed 1: Reposition, Distraction  Pain Addressed 2: Reposition, Distraction, Nurse  notified      Objective:     Communicated with RN prior to session.  Patient found HOB elevated with hemovac, SCD upon PT entry to room.     General Precautions: Standard, fall  Orthopedic Precautions: spinal precautions  Braces: N/A  Respiratory Status: Room air     Functional Mobility:  Bed Mobility:     Rolling Left:  stand by assistance  Rolling Right: contact guard assistance  Scooting: supervision  Supine to Sit: stand by assistance  Pt wanted to attempt to sit EOB on the R side of bed, deferred due to pt feeling awkward and unable to get R arm under him to achieve R sidelying to sit  Pt reports at home he transfers L side anyway's   Increased cueing for hand placement and sequencing    Transfers:     Sit to Stand:  stand by assistance with rolling walker  Bed to Chair: stand by assistance with  rolling walker  using  Step Transfer    Gait: 12ft SBA with RW  Pt able to maintain proper alinement decreased cueing required      Balance:   Static Sit EOB: SupV  Static Stand: SBA      AM-PAC 6 CLICK MOBILITY  Turning over in bed (including adjusting bedclothes, sheets and blankets)?: 3  Sitting down on and standing up from a chair with arms (e.g., wheelchair, bedside commode, etc.): 3  Moving from lying on back to sitting on the side of the bed?: 3  Moving to and from a bed to a chair (including a wheelchair)?: 3  Need to walk in hospital room?: 3  Climbing 3-5 steps with a railing?: 1  Basic Mobility Total Score: 16       Treatment & Education:  Educated pt on PT role/POC  Educated pt on importance of OOB activity and daily ambulation  Time provided for active listening, education, counseling and discussion of health disposition in regards to patient's current status   Questions/concerns addressed within PTA scope of practice. Answered to pt satisfaction, no further questions  White board updated accordingly   RN aware of patient's mobility needs and status  Discussed RW management, fall prevention, and safety    Gait belt utilized during session for patient safety  Bedside table in front of patient and area set up for function, convenience, and safety     Patient left with imaging with all lines intact, call button in reach, Rn notified, and imaging and partner present..    GOALS:   Multidisciplinary Problems       Physical Therapy Goals          Problem: Physical Therapy    Goal Priority Disciplines Outcome Interventions   Physical Therapy Goal     PT, PT/OT Progressing    Description: Goals to be met by: 7/3/25     Patient will increase functional independence with mobility by performin. Supine to sit with Set-up [place]  2. Rolling to Left and Right with Set-up Assistance.  3. Sit to stand transfer with Supervision  4. Bed to chair transfer with Supervision using LRAD or no AD  5. Gait  x 150 feet with Supervision using LRAD or no AD.   6. Ascend/descend 8 stair with left Handrails Contact Guard Assistance using LRAD or no AD.                          DME Justifications:  Scott's mobility limitation cannot be sufficiently resolved by the use of a cane. His functional mobility deficit can be sufficiently resolved with the use of a Rolling Walker. Patient's mobility limitation significantly impairs their ability to participate in one of more activities of daily living.  The use of a RW will significantly improve the patient's ability to participate in MRADLS and the patient will use it on regular basis in the home.     Time Tracking:     PT Received On: 25  PT Start Time: 922     PT Stop Time: 945  PT Total Time (min): 23 min     Billable Minutes: Gait Training 10 and Therapeutic Activity 13    Treatment Type: Treatment  PT/PTA: PTA     Number of PTA visits since last PT visit: 3     2025

## 2025-06-06 NOTE — PLAN OF CARE
Problem: Adult Inpatient Plan of Care  Goal: Plan of Care Review  Outcome: Met  Goal: Patient-Specific Goal (Individualized)  Outcome: Met  Goal: Absence of Hospital-Acquired Illness or Injury  Outcome: Met  Goal: Optimal Comfort and Wellbeing  Outcome: Met  Goal: Readiness for Transition of Care  Outcome: Met     Problem: Wound  Goal: Optimal Coping  Outcome: Met  Goal: Optimal Functional Ability  Outcome: Met  Goal: Absence of Infection Signs and Symptoms  Outcome: Met  Goal: Improved Oral Intake  Outcome: Met  Goal: Optimal Pain Control and Function  Outcome: Met  Goal: Skin Health and Integrity  Outcome: Met  Goal: Optimal Wound Healing  Outcome: Met     Problem: Infection  Goal: Absence of Infection Signs and Symptoms  Outcome: Met   Patient discharging home with walker, discharge instructions reviewed in detail with patient and spouse, allowed time for questions, all questions answered, IV removed, gauze and tape applied, bleeding controlled, discharge medications have been delivered to bedside, awaiting transportation .

## 2025-06-06 NOTE — PLAN OF CARE
Problem: Adult Inpatient Plan of Care  Goal: Plan of Care Review  Outcome: Progressing  Goal: Optimal Comfort and Wellbeing  Outcome: Progressing  Goal: Readiness for Transition of Care  Outcome: Progressing     Problem: Adult Inpatient Plan of Care  Goal: Plan of Care Review  Outcome: Progressing     Problem: Adult Inpatient Plan of Care  Goal: Optimal Comfort and Wellbeing  Outcome: Progressing     Problem: Adult Inpatient Plan of Care  Goal: Readiness for Transition of Care  Outcome: Progressing  . PRN medication effective for pain  Explained plan of care, verbalized understanding. Educated pt .on new medicine . No injury during shift, Side rails up x 2, call light by bedside.  No output from either drain this shift

## 2025-06-06 NOTE — PLAN OF CARE
[facility]  Discharge Final Note    Primary Care Provider: Stalin Gordillo MD    Expected Discharge Date: 6/6/2025    Final Discharge Note (most recent)       Final Note - 06/06/25 1313          Final Note    Assessment Type Final Discharge Note     Anticipated Discharge Disposition Home-Health Care [facility] Resources/Appts/Education Provided Provided patient/caregiver with written discharge plan information;Provided education on problems/symptoms using teachback;Appointments scheduled and added to AVS                   Future Appointments   Date Time Provider Department Center   6/17/2025  9:00 AM Betty Rosario PA-C [facility]   7/1/2025  7:00 AM [facility]   7/1/2025  8:00 AM Betty Rosario PA-C [facility]                  After-discharge care                Home Medical Care       *[facility]   Service: Home Health Services    [address]  [facility] [address]   Phone: [phone]

## 2025-06-06 NOTE — DISCHARGE SUMMARY
[facility]  Orthopedics  Discharge Summary      Patient Name: Scott Gallagher  MRN: [id number]  Admission Date: 6/3/2025  Hospital Length of Stay: 3 days  Discharge Date and Time: 06/06/2025 8:14 AM  Attending Physician: Raleigh Bond MD   Discharging Provider: Stalin Heredia MD  Primary Care Provider: Stalin Gordillo MD    HPI:   Scott Gallagher is a 34 y.o. male w/ a hx of hemorrhagic stroke (May 2021) with 4 episodes of focal dystonia (left hand and speech when it occurs) who returns to me today for FU. Patient continues to have LBP that radiates down LLE. Patient has been taking meds and doing exercises without much relief. He is miserable and wants surgical intervention.     The patient does not smoke, have DM or endorse IVDU. The patient is not on any blood thinners and does not take chronic narcotics. 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    Procedure(s) (LRB):  FUSION, SPINE, LUMBAR, TLIF, POSTERIOR APPROACH, USING PEDICLE SCREW L4-5 (L) (N/A)      Hospital Course:  The patient arrived to the [facility] for pre-operative management.  Upon completion of the pre-operative preparation, the patient was taken back to the operative theatre. The above procedure was performed without complication and the patient was transported to the post anesthesia care unit in stable condition.  After appropriate recovery from the anaesthetic agents used during the surgery, the patient was then transported to the inpatient floor. The patient has tolerated regular diet.  The patient's pain has been controlled using a multimodal approach. Currently, the patient's pain is well controlled on an oral regimen.  The patient has been voiding without difficulty.  The patient began participation in physical therapy after surgery and has progressed throughout the entire hospital stay. Once a downward trend was seen the output the drain was removed and dressed  "with a sterile dressing. Post-operative X-rays show stable hardware. Currently, the patient's progress is sufficient to allow the them to be discharged safely.  The patient agrees with this assessment and desires a discharge today.      Goals of Care Treatment Preferences:  Code Status: Full Code          Significant Diagnostic Studies: No pertinent studies.    Pending Diagnostic Studies:       Procedure Component Value Units Date/Time    X-Ray Lumbar Spine Ap And Lateral [[id number]]     Order Status: Sent Lab Status: No result           Final Active Diagnoses:    Diagnosis Date Noted POA    PRINCIPAL PROBLEM:  Lumbar spondylosis [M47.816] 06/03/2025 Yes      Problems Resolved During this Admission:      Discharged Condition: good    Disposition: Home-Health Care [facility] [place] [place]    Follow Up:    Patient Instructions:      WALKER FOR HOME USE     Order Specific Question Answer Comments   Type of Walker: Adult (5'4"-6'6")    With wheels? Yes    Height: 6' 3" (1.905 m)    Weight: 132 kg (291 lb 0.1 oz)    Length of need (1-99 months): 99    Does patient have medical equipment at home? cane, straight    Please check all that apply: Patient's condition impairs ambulation.    Please check all that apply: Patient is unable to safely ambulate without equipment.      Medications:  Reconciled Home Medications:      Medication List        PAUSE taking these medications      * acetaminophen 500 MG tablet  Wait to take this until your doctor or other care provider tells you to start again.  Commonly known as: TYLENOL  Take 1 tablet (500 mg total) by mouth 3 (three) times daily.  You also have another medication with the same name that you may need to continue taking.     * oxyCODONE 5 MG immediate release tablet  Wait to take this until your doctor or other care provider tells you to start again.  Commonly known as: ROXICODONE  Take 1 tablet (5 mg total) by mouth every 8 (eight) hours as needed for Pain.  You also have another medication " with the same name that you may need to continue taking.           * This list has 2 medication(s) that are the same as other medications prescribed for you. Read the directions carefully, and ask your doctor or other care provider to review them with you.                START taking these medications      celecoxib 200 MG capsule  Commonly known as: CeleBREX  Take 1 capsule (200 mg total) by mouth once daily. for 14 days     methocarbamoL 500 MG Tab  Commonly known as: Robaxin  Take 2 tablets (1,000 mg total) by mouth 3 (three) times daily. for 14 days            CHANGE how you take these medications      * acetaminophen 500 MG tablet  Commonly known as: TYLENOL  Take 2 tablets (1,000 mg total) by mouth every 8 (eight) hours. for 14 days  What changed: Another medication with the same name was paused. Ask your nurse or doctor if you should take this medication.     emtricitabine-tenofovir 200-300 mg 200-300 mg Tab  Commonly known as: TRUVADA  Take 1 tablet by mouth once daily.  What changed: additional instructions     * oxyCODONE 5 MG immediate release tablet  Commonly known as: ROXICODONE  Take 1 tablet (5 mg total) by mouth every 4 (four) hours as needed for Pain.  What changed: Another medication with the same name was paused. Ask your nurse or doctor if you should take this medication.           * This list has 2 medication(s) that are the same as other medications prescribed for you. Read the directions carefully, and ask your doctor or other care provider to review them with you.                CONTINUE taking these medications      ALPRAZolam 0.5 MG tablet  Commonly known as: XANAX  Take 1 tablet (0.5 mg total) by mouth daily as needed for Anxiety. 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  Start taking on: April 14,  2025     gabapentin 600 MG tablet  Commonly known as: NEURONTIN  Take 1.5 tablets (900 mg total) by mouth 2 (two) times daily for 7 days, THEN 1.5 tablets (900 mg total) 3 (three) times daily for 23 days.  Start taking on: April 14, 2025     hydrOXYzine pamoate 25 MG Cap  Commonly known as: VISTARIL  Take 1 capsule (25 mg total) by mouth 3 (three) times daily as needed (as needed for anxiety).     LORazepam 1 MG tablet  Commonly known as: ATIVAN  Take 1 tablet (1 mg total) by mouth daily as needed for Anxiety.     olmesartan-amLODIPin-hcthiazid 40-10-25 mg Tab  Take by mouth.     propranoloL 10 MG tablet  Commonly known as: INDERAL  Take 10 mg by mouth 3 (three) times daily as needed.            STOP taking these medications      aspirin 81 MG Chew            ASK your doctor about these medications      methylPREDNISolone 4 mg tablet  Commonly known as: MEDROL DOSEPACK  use as directed  Ask about: Should I take this medication?              Stalin Heredia MD  Orthopedics  [facility] - [facility]

## 2025-06-06 NOTE — SUBJECTIVE & OBJECTIVE
"Principal Problem:Lumbar spondylosis    Principal Orthopedic Problem: s/p L4-5 TLIF 6/3    Interval History: Overnight events: No issues. Doing well. Anticipating dc home today     Vitals: VSS on RA.     PT/OT update: Recs for low intensity.     Drain output: 0cc - pull today           Review of patient's allergies indicates:   Allergen Reactions    Anti-nausea [phosphorated carbohydrate] Hives     Pt unsure of name of Rx; states 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       Current Facility-Administered Medications   Medication    acetaminophen tablet 1,000 mg    losartan-hydrochlorothiazide 100-25 mg per tablet 1 tablet    And    amLODIPine tablet 10 mg    celecoxib capsule 200 mg    heparin (porcine) injection 5,000 Units    HYDROmorphone (PF) injection 0.5 mg    methocarbamoL tablet 1,000 mg    ondansetron injection 4 mg    oxyCODONE immediate release tablet 5 mg    oxyCODONE immediate release tablet Tab 10 mg    polyethylene glycol packet 17 g     Objective:     Vital Signs (Most Recent):  Temp: 98.5 °F (36.9 °C) (06/06/25 0430)  Pulse: 83 (06/06/25 0430)  Resp: 16 (06/06/25 0437)  BP: (!) 111/57 (06/06/25 0430)  SpO2: 95 % (06/06/25 0430) Vital Signs (24h Range):  Temp:  [97.9 °F (36.6 °C)-99.6 °F (37.6 °C)] 98.5 °F (36.9 °C)  Pulse:  [82-94] 83  Resp:  [15-20] 16  SpO2:  [95 %-98 %] 95 %  BP: (104-132)/(56-86) 111/57     Weight: 132 kg (291 lb 0.1 oz)  Height: 6' 3" (190.5 cm)  Body mass index is 36.37 kg/m².      Intake/Output Summary (Last 24 hours) at 6/6/2025 0722  Last data filed at 6/6/2025 0446  Gross per 24 hour   Intake 100 ml   Output 642 ml   Net -542 ml        Ortho/SPM Exam  Awake/alert/oriented x3, No acute distress, Afebrile, Vital signs stable  Normocephalic, Atraumatic  Good inspiratory effort with unlaboured breathing  Dressing is clean/dry/intact    Motor            RIGHT  LEFT  Hip Flexion (L3)                              "     5/5   5/5  Knee Extension (L4)                           5/5   5/5  EHL (L5)                                             5/5   5/5  Gastrocs (S1)                                     5/5   5/5  Peroneals (S1)                                   5/5   5/5       FHL (S2)                                             5/5   5/5       Significant Labs: CBC:   Recent Labs   Lab 06/04/25  0940   WBC 14.45*   HGB 14.8   HCT 43.6        AM labs still pending collection     Significant Imaging: I have reviewed and interpreted all pertinent imaging results/findings.

## 2025-06-06 NOTE — PLAN OF CARE
[facility]      HOME HEALTH ORDERS  FACE TO FACE ENCOUNTER    Patient Name: Scott Gallagher  YOB: 1990    PCP: Stalin Gordillo MD   PCP Address: [address] [place] [address] / [place] [address]  PCP Phone Number: [phone]  PCP Fax: [phone]    Encounter Date: 3/27/25    Admit to Home Health    Diagnoses:  Active Hospital Problems    Diagnosis  POA    *Lumbar spondylosis [M47.816]  Yes      Resolved Hospital Problems   No resolved problems to display.       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       Medications: Review discharge medications with patient and family and provide education.    Current Medications[1]     Medication List        PAUSE taking these medications      * acetaminophen 500 MG tablet  Wait to take this until your doctor or other care provider tells you to start again.  Commonly known as: TYLENOL  Take 1 tablet (500 mg total) by mouth 3 (three) times daily.  You also have another medication with the same name that you may need to continue taking.     * oxyCODONE 5 MG immediate release tablet  Wait to take this until your doctor or other care provider tells you to start again.  Commonly known as: ROXICODONE  Take 1 tablet (5 mg total) by mouth every 8 (eight) hours as needed for Pain.  You also have another medication with the same name that you may need to continue taking.           * This list has 2 medication(s) that are the same as other  medications prescribed for you. Read the directions carefully, and ask your doctor or other care provider to review them with you.                START taking these medications      celecoxib 200 MG capsule  Commonly known as: CeleBREX  Take 1 capsule (200 mg total) by mouth once daily. for 14 days     methocarbamoL 1,000 mg Tab  Take 1,000 mg by mouth 3 (three) times daily. for 14 days            CHANGE how you take these medications      * acetaminophen 500 MG tablet  Commonly known as: TYLENOL  Take 2 tablets (1,000 mg total) by mouth every 8 (eight) hours. for 14 days  What changed: Another medication with the same name was paused. Ask your nurse or doctor if you should take this medication.     emtricitabine-tenofovir 200-300 mg 200-300 mg Tab  Commonly known as: TRUVADA  Take 1 tablet by mouth once daily.  What changed: additional instructions     * oxyCODONE 5 MG immediate release tablet  Commonly known as: ROXICODONE  Take 1 tablet (5 mg total) by mouth every 4 (four) hours as needed for Pain.  What changed: Another medication with the same name was paused. Ask your nurse or doctor if you should take this medication.           * This list has 2 medication(s) that are the same as other medications prescribed for you. Read the directions carefully, and ask your doctor or other care provider to review them with you.                CONTINUE taking these medications      ALPRAZolam 0.5 MG tablet  Commonly known as: XANAX  Take 1 tablet (0.5 mg total) by mouth daily as needed for Anxiety. 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  Start taking on: April 14, 2025     gabapentin 600 MG tablet  Commonly known as: NEURONTIN  Take 1.5 tablets (900 mg total) by mouth 2 (two) times daily for 7 days, THEN 1.5 tablets (900 mg total)  3 (three) times daily for 23 days.  Start taking on: April 14, 2025     hydrOXYzine pamoate 25 MG Cap  Commonly known as: VISTARIL  Take 1 capsule (25 mg total) by mouth 3 (three) times daily as needed (as needed for anxiety).     LORazepam 1 MG tablet  Commonly known as: ATIVAN  Take 1 tablet (1 mg total) by mouth daily as needed for Anxiety.     olmesartan-amLODIPin-hcthiazid 40-10-25 mg Tab  Take by mouth.     propranoloL 10 MG tablet  Commonly known as: INDERAL  Take 10 mg by mouth 3 (three) times daily as needed.            STOP taking these medications      aspirin 81 MG Chew            ASK your doctor about these medications      methylPREDNISolone 4 mg tablet  Commonly known as: MEDROL DOSEPACK  use as directed  Ask about: Should I take this medication?                I have seen and examined this patient within the last 30 days. My clinical findings that support the need for the home health skilled services and home bound status are the following:no   Requiring assistive device to leave home due to unsteady gait caused by  Surgery.     Diet:   regular diet    Labs:  none    Referrals/ Consults  Physical Therapy to evaluate and treat. Evaluate for home safety and equipment needs; Establish/upgrade home exercise program. Perform / instruct on therapeutic exercises, gait training, transfer training, and Range of Motion.  Occupational Therapy to evaluate and treat. Evaluate home environment for safety and equipment needs. Perform/Instruct on transfers, ADL training, ROM, and therapeutic exercises.    Activities:   Weight bear as tolerated with spine precautions    Nursing:   Agency to admit patient within 24 hours of hospital discharge unless specified on physician order or at patient request    SN to complete comprehensive assessment including routine vital signs. Instruct on disease process and s/s of complications to report to MD. Review/verify medication list sent home with the patient at time of discharge   and instruct patient/caregiver as needed. Frequency may be adjusted depending on start of care date.     Skilled nurse to perform up to 3 visits PRN for symptoms related to diagnosis    Notify MD if SBP > 160 or < 90; DBP > 90 or < 50; HR > 120 or < 50; Temp > 101; O2 < 88%;     Ok to schedule additional visits based on staff availability and patient request on consecutive days within the home health episode.    When multiple disciplines ordered:    Start of Care occurs on Sunday - Wednesday schedule remaining discipline evaluations as ordered on separate consecutive days following the start of care.    Thursday SOC -schedule subsequent evaluations Friday and Monday the following week.     Friday - Saturday SOC - schedule subsequent discipline evaluations on consecutive days starting Monday of the following week.    Miscellaneous   none    Home Health Aide:  none    Wound Care Orders  You may remove your dressing and shower 7 days after surgery. Until then please keep your wound clean and dry. Sponge baths are acceptable. Do not go in a pool or hot tub until seen in clinic.     I certify that this patient is confined to his home and needs physical therapy and occupational therapy.               [1]   Current Facility-Administered Medications   Medication Dose Route Frequency Provider Last Rate Last Admin    acetaminophen tablet 1,000 mg  1,000 mg Oral Q8H Buddy Frank MD   1,000 mg at 06/06/25 0438    losartan-hydrochlorothiazide 100-25 mg per tablet 1 tablet  1 tablet Oral Daily Buddy Frank MD   1 tablet at 06/05/25 0828    And    amLODIPine tablet 10 mg  10 mg Oral Daily Buddy Frank MD   10 mg at 06/05/25 0828    celecoxib capsule 200 mg  200 mg Oral Daily Buddy Frank MD   200 mg at 06/05/25 0828    heparin (porcine) injection 5,000 Units  5,000 Units Subcutaneous Q8H Buddy Frank MD   5,000 Units at 06/06/25 0438    HYDROmorphone (PF) injection 0.5 mg  0.5 mg Intravenous Q3H PRN Raleigh Bond MD    0.5 mg at 06/06/25 0312    methocarbamoL tablet 1,000 mg  1,000 mg Oral TID Buddy Frank MD   1,000 mg at 06/05/25 2017    ondansetron injection 4 mg  4 mg Intravenous Q8H PRN Buddy Frank MD        oxyCODONE immediate release tablet 5 mg  5 mg Oral Q3H PRN Buddy Frank MD        oxyCODONE immediate release tablet Tab 10 mg  10 mg Oral Q3H PRN Buddy Frank MD   10 mg at 06/06/25 0437    polyethylene glycol packet 17 g  17 g Oral Daily Buddy Frank MD   17 g at 06/05/25 2239

## 2025-06-07 PROCEDURE — G0180 MD CERTIFICATION HHA PATIENT: HCPCS | Mod: ,,, | Performed by: ORTHOPAEDIC SURGERY

## 2025-06-09 RX ORDER — GABAPENTIN 300 MG/1
300 CAPSULE ORAL 3 TIMES DAILY
Qty: 90 CAPSULE | Refills: 11 | Status: SHIPPED | OUTPATIENT
Start: 2025-06-09 | End: 2026-06-09

## 2025-06-10 ENCOUNTER — PATIENT OUTREACH (OUTPATIENT)
Dept: ADMINISTRATIVE | Facility: CLINIC | Age: 35
End: 2025-06-10
Payer: COMMERCIAL

## 2025-06-10 NOTE — PROGRESS NOTES
C3 nurse spoke with Scott Gallagher for a TCC post hospital discharge follow up call. The patient does not have a scheduled HOSFU appointment with Stalin Gordillo MD within 5-7 days post hospital discharge date 06/06/2025. C3 nurse was unable to schedule HOSFU appointment in [facility] as the patient declines a PCP HOSFU appointment at this time.

## 2025-06-17 ENCOUNTER — OFFICE VISIT (OUTPATIENT)
Dept: ORTHOPEDICS | Facility: CLINIC | Age: 35
End: 2025-06-17
Payer: COMMERCIAL

## 2025-06-17 ENCOUNTER — PATIENT MESSAGE (OUTPATIENT)
Dept: PODIATRY | Facility: CLINIC | Age: 35
End: 2025-06-17
Payer: COMMERCIAL

## 2025-06-17 DIAGNOSIS — Z98.1 S/P LUMBAR FUSION: Primary | ICD-10-CM

## 2025-06-17 PROCEDURE — 1159F MED LIST DOCD IN RCRD: CPT | Mod: CPTII,S$GLB,, | Performed by: PHYSICIAN ASSISTANT

## 2025-06-17 PROCEDURE — 99999 PR PBB SHADOW E&M-EST. PATIENT-LVL III: CPT | Mod: PBBFAC,,, | Performed by: PHYSICIAN ASSISTANT

## 2025-06-17 PROCEDURE — 99024 POSTOP FOLLOW-UP VISIT: CPT | Mod: S$GLB,,, | Performed by: PHYSICIAN ASSISTANT

## 2025-06-17 NOTE — PROGRESS NOTES
Date: 06/17/2025    Supervising Physician: Raleigh Bond M.D.    Date of Surgery: 6/3/2025    Procedure: L4/5 TLIF    History: Scott Gallagher is seen today for follow-up following the above listed procedure and for staple removal. Overall the patient is doing well but today notes his pain is improved compared to before surgery.   Pain is well controlled with current pain medication.  he denies fever, chills, and sweats since the time of the surgery.       Exam: Post op dressing taken down.  Staples in place.  Incision is healing well, clean, dry and intact.   There is no sign of infection. Neuro exam is stable. No signs of DVT.    Radiographs: no new imaging today.     Assessment/Plan: 2 weeks post op.    Doing well postoperatively. Staples removed today.  No refills needed. No lifting over 10 lbs.     I will plan to see the patient back for the next postop visit in 2 weeks with imaging.     Thank you for the opportunity to participate in this patient's care. Please give me a call if there are any concerns or questions.

## 2025-06-25 ENCOUNTER — PATIENT MESSAGE (OUTPATIENT)
Dept: PODIATRY | Facility: CLINIC | Age: 35
End: 2025-06-25

## 2025-06-25 ENCOUNTER — APPOINTMENT (OUTPATIENT)
Dept: RADIOLOGY | Facility: OTHER | Age: 35
End: 2025-06-25
Attending: PODIATRIST
Payer: COMMERCIAL

## 2025-06-25 ENCOUNTER — OFFICE VISIT (OUTPATIENT)
Dept: PODIATRY | Facility: CLINIC | Age: 35
End: 2025-06-25
Payer: COMMERCIAL

## 2025-06-25 VITALS
WEIGHT: 291 LBS | SYSTOLIC BLOOD PRESSURE: 128 MMHG | HEIGHT: 75 IN | HEART RATE: 96 BPM | BODY MASS INDEX: 36.18 KG/M2 | DIASTOLIC BLOOD PRESSURE: 76 MMHG

## 2025-06-25 DIAGNOSIS — M24.573 EQUINUS CONTRACTURE OF ANKLE: ICD-10-CM

## 2025-06-25 DIAGNOSIS — M21.269: ICD-10-CM

## 2025-06-25 DIAGNOSIS — M76.62 ACHILLES TENDINITIS OF LEFT LOWER EXTREMITY: Primary | ICD-10-CM

## 2025-06-25 DIAGNOSIS — M79.672 FOOT PAIN, LEFT: ICD-10-CM

## 2025-06-25 DIAGNOSIS — M76.62 ACHILLES TENDINITIS OF LEFT LOWER EXTREMITY: ICD-10-CM

## 2025-06-25 DIAGNOSIS — M77.9 CAPSULITIS: ICD-10-CM

## 2025-06-25 PROCEDURE — 73630 X-RAY EXAM OF FOOT: CPT | Mod: TC,PN,LT

## 2025-06-25 PROCEDURE — 99999 PR PBB SHADOW E&M-EST. PATIENT-LVL III: CPT | Mod: PBBFAC,,, | Performed by: PODIATRIST

## 2025-06-25 RX ORDER — LIDOCAINE AND PRILOCAINE 25; 25 MG/G; MG/G
CREAM TOPICAL
Qty: 30 G | Refills: 3 | Status: SHIPPED | OUTPATIENT
Start: 2025-06-25

## 2025-06-25 NOTE — PROGRESS NOTES
Subjective:      Patient ID: Scott Gallagher is a 35 y.o. male.    Chief Complaint: Gout    Sharp burning pain in the back of the left ankle.  Gradual onset, worsening over the past month.  Aggravated by increased weight-bearing altered gait.  No prior medical treatment.  No self-treatment.  Denies trauma and surgery left foot and ankle.      Cc2  deep aching throbbing pain in the bottom of the forefoot left.  Gradual onset, worsening over the past month or so.  Aggravated by increased weight-bearing prolonged standing some shoes.  Altered gait due to knee flexion and back surgery.  Denies trauma and surgery both feet.  No prior medical treatment.  No self-treatment.    Review of Systems   Constitutional: Negative for chills, diaphoresis, fever, malaise/fatigue and night sweats.   Cardiovascular:  Negative for claudication, cyanosis, leg swelling and syncope.   Skin:  Negative for color change, dry skin, nail changes, rash, suspicious lesions and unusual hair distribution.   Musculoskeletal:  Positive for joint pain. Negative for falls, joint swelling, muscle cramps, muscle weakness and stiffness.   Gastrointestinal:  Negative for constipation, diarrhea, nausea and vomiting.   Neurological:  Negative for brief paralysis, disturbances in coordination, focal weakness, numbness, paresthesias, sensory change and tremors.           Objective:      Physical Exam  Constitutional:       General: He is not in acute distress.     Appearance: He is well-developed. He is not diaphoretic.   Cardiovascular:      Pulses:           Popliteal pulses are 2+ on the right side and 2+ on the left side.        Dorsalis pedis pulses are 2+ on the right side and 2+ on the left side.        Posterior tibial pulses are 2+ on the right side and 2+ on the left side.      Comments: Capillary refill 3 seconds all toes/distal feet, all toes/both feet warm to touch.      Negative lymphadenopathy bilateral popliteal fossa and tarsal tunnel.       Negavie lower extremity edema bilateral.    Musculoskeletal:      Right ankle: No swelling, deformity, ecchymosis or lacerations. Normal range of motion. Normal pulse.      Right Achilles Tendon: Normal. No defects. Zhu's test negative.      Comments: Pushing unable to fully extend the left knee due to contracture and back pain.  Gait is altered walks with a cane for fall prevention instability.      Patient has sharp deep pain to deep palpation of the distal 8-10 cm of the Achilles tendon of the back of the left ankle without palpable defect, functional deficit, or discontinuity.  Negative Zhu test left.      Ankle dorsiflexion decreased at <10 degrees bilateral with moderate increase with knee flexion bilateral.      Pain to palpation inferior [id number] left without evidence of trauma or infection.     Lymphadenopathy:      Lower Body: No right inguinal adenopathy. No left inguinal adenopathy.      Comments: Negative lymphadenopathy bilateral popliteal fossa and tarsal tunnel.    Negative lymphangitic streaking bilateral feet/ankles/legs.   Skin:     General: Skin is warm and dry.      Capillary Refill: Capillary refill takes 2 to 3 seconds.      Coloration: Skin is not pale.      Findings: No abrasion, bruising, burn, ecchymosis, erythema, laceration, lesion or rash.      Nails: There is no clubbing.      Comments:   Skin is normal age and health appropriate color, turgor, texture, and temperature bilateral lower extremities without ulceration, hyperpigmentation, discoloration, masses nodules or cords palpated.  No ecchymosis, erythema, edema, or cardinal signs of infection bilateral lower extremities.    Neurological:      Mental Status: He is alert and oriented to person, place, and time.      Sensory: No sensory deficit.      Motor: No tremor, atrophy or abnormal muscle tone.      Gait: Gait normal.      Comments:   Negative moulder sign/click bilateral all intermetatarsal spaces.    Negative tinel  sign to percussion sural, superficial peroneal, deep peroneal, saphenous, and posterior tibial nerves right and left ankles and feet.    Negative allodynia both feet   Psychiatric:         Behavior: Behavior is cooperative.           Assessment:       Encounter Diagnoses   Name Primary?    Achilles tendinitis of left lower extremity Yes    Capsulitis     Foot pain, left     Equinus contracture of ankle     Acquired fixed flexion deformity of knee          Plan:       Scott was seen today for gout.    Diagnoses and all orders for this visit:    Achilles tendinitis of left lower extremity  -     X-Ray Foot Complete Left; Future  -     X-Ray Ankle Complete Left; Future  -     SPLINT FOR HOME USE    Capsulitis  -     X-Ray Foot Complete Left; Future  -     X-Ray Ankle Complete Left; Future    Foot pain, left  -     X-Ray Foot Complete Left; Future  -     X-Ray Ankle Complete Left; Future  -     SPLINT FOR HOME USE    Equinus contracture of ankle  -     X-Ray Foot Complete Left; Future  -     X-Ray Ankle Complete Left; Future  -     SPLINT FOR HOME USE    Acquired fixed flexion deformity of knee  -     X-Ray Foot Complete Left; Future  -     X-Ray Ankle Complete Left; Future    Other orders  -     LIDOcaine-prilocaine (EMLA) cream; Apply topically as needed.      I counseled the patient on his conditions, their implications and medical management.        Patient will stretch the tendo achilles complex three times daily as demonstrated in the office.  Literature was dispensed illustrating proper stretching technique.    I applied a plantar rest strapping to the patient's foot to offload symptomatic area, support the arch, and relieve pain.    Patient will obtain over the counter arch supports and wear them in shoes whenever possible.  Athletic shoes intended for walking or running are usually best.    Discussed conservative treatment with shoes of adequate dimensions, material, and style to alleviate symptoms and  delay or prevent surgical intervention.    Continue Celebrex and pain management by his pain management specialist.      Continue physical therapy to assist with knee extension ankle dorsiflexion stretching of the calves facilitation of normal gait.      X-rays ray left foot and ankle, night splint, topical EMLA cream once nightly left foot and ankle about 30 minutes before bed to relieve pain facilitate sleep.    4-6 weeks if still symptomatic, sooner p.r.n.          Follow up in about 1 month (around 7/25/2025).

## 2025-07-01 ENCOUNTER — PATIENT MESSAGE (OUTPATIENT)
Dept: ORTHOPEDICS | Facility: CLINIC | Age: 35
End: 2025-07-01

## 2025-07-01 ENCOUNTER — HOSPITAL ENCOUNTER (OUTPATIENT)
Dept: RADIOLOGY | Facility: HOSPITAL | Age: 35
Discharge: HOME | End: 2025-07-01
Attending: ORTHOPAEDIC SURGERY
Payer: COMMERCIAL

## 2025-07-01 ENCOUNTER — OFFICE VISIT (OUTPATIENT)
Dept: ORTHOPEDICS | Facility: CLINIC | Age: 35
End: 2025-07-01
Payer: COMMERCIAL

## 2025-07-01 VITALS — HEIGHT: 75 IN | BODY MASS INDEX: 36.18 KG/M2 | WEIGHT: 291 LBS

## 2025-07-01 DIAGNOSIS — Z98.1 S/P LUMBAR FUSION: Primary | ICD-10-CM

## 2025-07-01 DIAGNOSIS — Z98.1 S/P LUMBAR SPINAL FUSION: ICD-10-CM

## 2025-07-01 PROCEDURE — 72100 X-RAY EXAM L-S SPINE 2/3 VWS: CPT | Mod: TC

## 2025-07-01 PROCEDURE — 72100 X-RAY EXAM L-S SPINE 2/3 VWS: CPT | Mod: 26,,, | Performed by: RADIOLOGY

## 2025-07-01 PROCEDURE — 99999 PR PBB SHADOW E&M-EST. PATIENT-LVL III: CPT | Mod: PBBFAC,,, | Performed by: PHYSICIAN ASSISTANT

## 2025-07-01 PROCEDURE — 99024 POSTOP FOLLOW-UP VISIT: CPT | Mod: S$GLB,,, | Performed by: PHYSICIAN ASSISTANT

## 2025-07-01 PROCEDURE — 1159F MED LIST DOCD IN RCRD: CPT | Mod: CPTII,S$GLB,, | Performed by: PHYSICIAN ASSISTANT

## 2025-07-01 RX ORDER — CYCLOBENZAPRINE HCL 5 MG
5-10 TABLET ORAL 3 TIMES DAILY PRN
Qty: 60 TABLET | Refills: 0 | Status: SHIPPED | OUTPATIENT
Start: 2025-07-01

## 2025-07-01 NOTE — PROGRESS NOTES
Date: 07/01/2025    Supervising Physician: Raleigh Bond M.D.    Date of Surgery: 6/3/2025    Procedure: L4/5 TLIF    History: Scott Gallagher is seen today for follow-up following the above listed procedure and for staple removal. Overall the patient is doing well but today notes his pain is improved compared to before surgery.   He still has some left leg pain but overall he is pleased. He is taking tylenol and gabapentin for pain.       Exam: Incision is healing well, clean, dry and intact.   There is no sign of infection. Neuro exam is stable. No signs of DVT.    Radiographs: imaging today shows hardware in place, no evidence of failure.     Assessment/Plan: 4 weeks post op.    Doing well postoperatively. No lifting over 10 lbs.     I will plan to see the patient back for the next postop visit in 2 months with imaging.     Thank you for the opportunity to participate in this patient's care. Please give me a call if there are any concerns or questions.

## 2025-07-05 NOTE — TELEPHONE ENCOUNTER
Contacted the patient to schedule an endoscopy procedure(s) 7/5/24. The patient did not answer the call and left a voice message requesting a call back.     [id number]

## 2025-07-21 ENCOUNTER — PATIENT MESSAGE (OUTPATIENT)
Dept: ORTHOPEDICS | Facility: CLINIC | Age: 35
End: 2025-07-21
Payer: COMMERCIAL

## 2025-07-31 ENCOUNTER — PATIENT MESSAGE (OUTPATIENT)
Dept: ADMINISTRATIVE | Facility: OTHER | Age: 35
End: 2025-07-31
Payer: COMMERCIAL

## 2025-08-12 ENCOUNTER — EXTERNAL HOME HEALTH (OUTPATIENT)
Dept: HOME HEALTH SERVICES | Facility: HOSPITAL | Age: 35
End: 2025-08-12
Payer: COMMERCIAL

## 2025-08-14 DIAGNOSIS — Z29.81 ENCOUNTER FOR HIV PRE-EXPOSURE PROPHYLAXIS: ICD-10-CM

## 2025-08-18 RX ORDER — EMTRICITABINE AND TENOFOVIR DISOPROXIL FUMARATE 200; 300 MG/1; MG/1
1 TABLET, FILM COATED ORAL DAILY
Qty: 30 TABLET | Refills: 2 | OUTPATIENT
Start: 2025-08-18 | End: 2025-11-16

## 2025-08-28 ENCOUNTER — TELEPHONE (OUTPATIENT)
Dept: ORTHOPEDICS | Facility: CLINIC | Age: 35
End: 2025-08-28
Payer: COMMERCIAL

## 2025-09-02 ENCOUNTER — OFFICE VISIT (OUTPATIENT)
Dept: ORTHOPEDICS | Facility: CLINIC | Age: 35
End: 2025-09-02
Payer: COMMERCIAL

## 2025-09-02 ENCOUNTER — HOSPITAL ENCOUNTER (OUTPATIENT)
Dept: RADIOLOGY | Facility: HOSPITAL | Age: 35
Discharge: HOME | End: 2025-09-02
Attending: PHYSICIAN ASSISTANT
Payer: COMMERCIAL

## 2025-09-02 VITALS — BODY MASS INDEX: 36.18 KG/M2 | HEIGHT: 75 IN | WEIGHT: 291 LBS

## 2025-09-02 DIAGNOSIS — Z98.1 S/P LUMBAR FUSION: ICD-10-CM

## 2025-09-02 DIAGNOSIS — Z98.1 S/P LUMBAR FUSION: Primary | ICD-10-CM

## 2025-09-02 PROCEDURE — 99999 PR PBB SHADOW E&M-EST. PATIENT-LVL III: CPT | Mod: PBBFAC,,, | Performed by: PHYSICIAN ASSISTANT

## 2025-09-02 PROCEDURE — 72100 X-RAY EXAM L-S SPINE 2/3 VWS: CPT | Mod: 26,,, | Performed by: RADIOLOGY

## 2025-09-02 PROCEDURE — 1159F MED LIST DOCD IN RCRD: CPT | Mod: CPTII,S$GLB,, | Performed by: PHYSICIAN ASSISTANT

## 2025-09-02 PROCEDURE — 72100 X-RAY EXAM L-S SPINE 2/3 VWS: CPT | Mod: TC

## 2025-09-02 PROCEDURE — 99024 POSTOP FOLLOW-UP VISIT: CPT | Mod: S$GLB,,, | Performed by: PHYSICIAN ASSISTANT

## (undated) DEVICE — CORD BIPOLAR 12 FOOT

## (undated) DEVICE — GLOVE SENSICARE PI GRN 6.5

## (undated) DEVICE — NDL HYPO 27G X 1 1/2

## (undated) DEVICE — DRAPE TOP 53X102IN

## (undated) DEVICE — DRAPE CORETEMP FLD WRM 56X62IN

## (undated) DEVICE — KIT SURGIFLO HEMOSTATIC MATRIX

## (undated) DEVICE — DRAPE C-ARM ELAS CLIP 42X120IN

## (undated) DEVICE — BUR BONE CUT MICRO TPS 3X3.8MM

## (undated) DEVICE — CORD FOR BIPOLAR FORCEPS 12

## (undated) DEVICE — BIT DRILL REAM GPS 3.5MM

## (undated) DEVICE — SYR 10CC LUER LOCK

## (undated) DEVICE — SYR IRRIGATION BULB STER 60ML

## (undated) DEVICE — CARTRIDGE OIL

## (undated) DEVICE — SUT VICRYL+ 1 CT1 18IN

## (undated) DEVICE — SPONGE LAP 4X18 PREWASHED

## (undated) DEVICE — DRESSING SURGICAL 1/2X1/2

## (undated) DEVICE — Device

## (undated) DEVICE — TRAY DRY SKIN SCRUB PREP

## (undated) DEVICE — BLADE 4IN EDGE INSULATED

## (undated) DEVICE — DRAPE STERI-DRAPE 1000 17X11IN

## (undated) DEVICE — TIP YANKAUERS POOLE TIP 72IN

## (undated) DEVICE — HYDROGEN PEROXIDE HDX10 3% 4OZ

## (undated) DEVICE — DRESSING MEPILEX BORDER 4 X 4

## (undated) DEVICE — COVER CAMERA OPERATING ROOM

## (undated) DEVICE — ADHESIVE DERMABOND ADVANCED

## (undated) DEVICE — SUT STRATAFIX 3-0 30CM

## (undated) DEVICE — DRESSING TRANS 4X4 TEGADERM

## (undated) DEVICE — COVER BACK TBL HD 2-TIER 72IN

## (undated) DEVICE — CATH CATHLON IV 16G 2IN

## (undated) DEVICE — NDL SPINAL 18GX3.5 SPINOCAN

## (undated) DEVICE — BRIEFS ADULT SEAMLESS LG/XL

## (undated) DEVICE — DRILL BIT SURG GPS HI SPD 4MM

## (undated) DEVICE — DRESSING AQUACEL SACRAL 9 X 9

## (undated) DEVICE — TIP YANKAUERS BULB NO VENT

## (undated) DEVICE — TRAY NEURO OMC

## (undated) DEVICE — PAD ABDOMINAL STERILE 8X10IN

## (undated) DEVICE — SUT 2-0 SILK 30IN BLK BRAID

## (undated) DEVICE — TRAY CATH 1-LYR URIMTR 16FR

## (undated) DEVICE — PENCIL ROCKER SWITCH 10FT CORD

## (undated) DEVICE — COVER LIGHT HANDLE 80/CA

## (undated) DEVICE — SUT VICRYL PLUS 2-0 CT1 18

## (undated) DEVICE — SOL POVIDONE SCRUB IODINE 4 OZ

## (undated) DEVICE — DRESSING ABSRBNT ISLAND 3.6X8

## (undated) DEVICE — DRAPE ABDOMINAL TIBURON 14X11

## (undated) DEVICE — SPONGE COTTON TRAY 4X4IN

## (undated) DEVICE — DIFFUSER

## (undated) DEVICE — NDL 20GX1-1/2IN IB

## (undated) DEVICE — DRAPE C-ARMOR EQUIPMENT COVER

## (undated) DEVICE — APPLICATOR CHLORAPREP ORN 26ML

## (undated) DEVICE — DRESSING AQUACEL FOAM 5 X 5

## (undated) DEVICE — SYR 3CC LUER LOC

## (undated) DEVICE — SUT 2-0 12-18IN SILK

## (undated) DEVICE — TUBE FRAZIER 5MM 2FT SOFT TIP

## (undated) DEVICE — ELECTRODE REM PLYHSV RETURN 9

## (undated) DEVICE — JELLY SURGILUBE 5GR

## (undated) DEVICE — KIT SPINAL PATIENT CARE JACK

## (undated) DEVICE — SUT STRATAFIX SPRL PS-2 3-0

## (undated) DEVICE — SUT VICRYL PLUS 0 CT1 18IN

## (undated) DEVICE — MARKER SKIN RULER STERILE

## (undated) DEVICE — SUT VICRYL PLUS 2-0

## (undated) DEVICE — DRESSING AQUACEL FOAM 3 X 3

## (undated) DEVICE — SEE MEDLINE ITEM 156905

## (undated) DEVICE — STRIP MEDI WND CLSR 1X5IN

## (undated) DEVICE — GLOVE GAMMEX 7 PF STERILE

## (undated) DEVICE — DRAPE UNDERBUTTOCKS PCH STRL

## (undated) DEVICE — LEGGING CLEAR POLY 2/PACK

## (undated) DEVICE — GLOVE SENSICARE PI SURG 6

## (undated) DEVICE — GLOVE GAMMEX SURG LF PI SZ 7.5

## (undated) DEVICE — BOWL STERILE LARGE 32OZ

## (undated) DEVICE — BANDAID HOT COLORS

## (undated) DEVICE — BLADE MILL+ BONE MEDIUM DISP

## (undated) DEVICE — DRESSING ADH ISLAND 3.6 X 14

## (undated) DEVICE — ELECTRODE MEGADYNE RETURN DUAL

## (undated) DEVICE — DRAPE LAP T SHT W/ INSTR PAD

## (undated) DEVICE — SOL POVIDONE PREP IODINE 4 OZ

## (undated) DEVICE — SPONGE GAUZE 16PLY 4X4

## (undated) DEVICE — SOL IRR SOD CHL .9% POUR